# Patient Record
Sex: FEMALE | Race: BLACK OR AFRICAN AMERICAN | NOT HISPANIC OR LATINO | Employment: OTHER | URBAN - METROPOLITAN AREA
[De-identification: names, ages, dates, MRNs, and addresses within clinical notes are randomized per-mention and may not be internally consistent; named-entity substitution may affect disease eponyms.]

---

## 2017-01-30 ENCOUNTER — HISTORICAL (OUTPATIENT)
Dept: LAB | Facility: HOSPITAL | Age: 72
End: 2017-01-30

## 2017-02-01 ENCOUNTER — HISTORICAL (OUTPATIENT)
Dept: RADIOLOGY | Facility: HOSPITAL | Age: 72
End: 2017-02-01

## 2017-02-10 ENCOUNTER — HISTORICAL (OUTPATIENT)
Dept: RADIOLOGY | Facility: HOSPITAL | Age: 72
End: 2017-02-10

## 2017-04-28 ENCOUNTER — HISTORICAL (OUTPATIENT)
Dept: LAB | Facility: HOSPITAL | Age: 72
End: 2017-04-28

## 2017-07-28 ENCOUNTER — HISTORICAL (OUTPATIENT)
Dept: RADIOLOGY | Facility: HOSPITAL | Age: 72
End: 2017-07-28

## 2017-07-28 ENCOUNTER — HISTORICAL (OUTPATIENT)
Dept: LAB | Facility: HOSPITAL | Age: 72
End: 2017-07-28

## 2017-07-28 LAB
ABS NEUT (OLG): 6.77 X10(3)/MCL (ref 2.1–9.2)
ALBUMIN SERPL-MCNC: 3.6 GM/DL (ref 3.4–5)
ALBUMIN/GLOB SERPL: 0.9 {RATIO}
ALP SERPL-CCNC: 81 UNIT/L (ref 38–126)
ALT SERPL-CCNC: 35 UNIT/L (ref 12–78)
AST SERPL-CCNC: 32 UNIT/L (ref 15–37)
BASOPHILS # BLD AUTO: 0 X10(3)/MCL (ref 0–0.2)
BASOPHILS NFR BLD AUTO: 0 %
BILIRUB SERPL-MCNC: 0.3 MG/DL (ref 0.2–1)
BILIRUBIN DIRECT+TOT PNL SERPL-MCNC: 0.1 MG/DL (ref 0–0.2)
BILIRUBIN DIRECT+TOT PNL SERPL-MCNC: 0.2 MG/DL (ref 0–0.8)
BUN SERPL-MCNC: 13 MG/DL (ref 7–18)
CALCIUM SERPL-MCNC: 9 MG/DL (ref 8.5–10.1)
CHLORIDE SERPL-SCNC: 102 MMOL/L (ref 98–107)
CHOLEST SERPL-MCNC: 120 MG/DL (ref 0–200)
CHOLEST/HDLC SERPL: 1.9 {RATIO} (ref 0–4)
CO2 SERPL-SCNC: 30 MMOL/L (ref 21–32)
CREAT SERPL-MCNC: 0.58 MG/DL (ref 0.55–1.02)
EOSINOPHIL # BLD AUTO: 0.2 X10(3)/MCL (ref 0–0.9)
EOSINOPHIL NFR BLD AUTO: 2 %
ERYTHROCYTE [DISTWIDTH] IN BLOOD BY AUTOMATED COUNT: 14.2 % (ref 11.5–17)
EST. AVERAGE GLUCOSE BLD GHB EST-MCNC: 169 MG/DL
GLOBULIN SER-MCNC: 4 GM/DL (ref 2.4–3.5)
GLUCOSE SERPL-MCNC: 174 MG/DL (ref 74–106)
HBA1C MFR BLD: 7.5 % (ref 4.2–6.3)
HCT VFR BLD AUTO: 31.6 % (ref 37–47)
HDLC SERPL-MCNC: 64 MG/DL (ref 35–60)
HGB BLD-MCNC: 9.8 GM/DL (ref 12–16)
LDLC SERPL CALC-MCNC: 43 MG/DL (ref 0–129)
LYMPHOCYTES # BLD AUTO: 2 X10(3)/MCL (ref 0.6–4.6)
LYMPHOCYTES NFR BLD AUTO: 21 %
MCH RBC QN AUTO: 28.4 PG (ref 27–31)
MCHC RBC AUTO-ENTMCNC: 31 GM/DL (ref 33–36)
MCV RBC AUTO: 91.6 FL (ref 80–94)
MONOCYTES # BLD AUTO: 0.6 X10(3)/MCL (ref 0.1–1.3)
MONOCYTES NFR BLD AUTO: 6 %
NEUTROPHILS # BLD AUTO: 6.77 X10(3)/MCL (ref 2.1–9.2)
NEUTROPHILS NFR BLD AUTO: 70 %
PLATELET # BLD AUTO: 277 X10(3)/MCL (ref 130–400)
PMV BLD AUTO: 11.8 FL (ref 9.4–12.4)
POTASSIUM SERPL-SCNC: 4.4 MMOL/L (ref 3.5–5.1)
PROT SERPL-MCNC: 7.6 GM/DL (ref 6.4–8.2)
RBC # BLD AUTO: 3.45 X10(6)/MCL (ref 4.2–5.4)
SODIUM SERPL-SCNC: 139 MMOL/L (ref 136–145)
TRIGL SERPL-MCNC: 63 MG/DL (ref 30–150)
VLDLC SERPL CALC-MCNC: 13 MG/DL
WBC # SPEC AUTO: 9.6 X10(3)/MCL (ref 4.5–11.5)

## 2017-09-05 ENCOUNTER — HISTORICAL (OUTPATIENT)
Dept: RADIOLOGY | Facility: HOSPITAL | Age: 72
End: 2017-09-05

## 2017-10-27 ENCOUNTER — HISTORICAL (OUTPATIENT)
Dept: LAB | Facility: HOSPITAL | Age: 72
End: 2017-10-27

## 2017-10-27 LAB
ABS NEUT (OLG): 8.55 X10(3)/MCL (ref 2.1–9.2)
BASOPHILS # BLD AUTO: 0 X10(3)/MCL (ref 0–0.2)
BASOPHILS NFR BLD AUTO: 0 %
EOSINOPHIL # BLD AUTO: 0.2 X10(3)/MCL (ref 0–0.9)
EOSINOPHIL NFR BLD AUTO: 1 %
ERYTHROCYTE [DISTWIDTH] IN BLOOD BY AUTOMATED COUNT: 14.9 % (ref 11.5–17)
EST. AVERAGE GLUCOSE BLD GHB EST-MCNC: 160 MG/DL
FERRITIN SERPL-MCNC: 21.2 NG/ML (ref 8–388)
FOLATE SERPL-MCNC: 19 NG/ML (ref 3.1–17.5)
HAPTOGLOB SERPL-MCNC: 320 MG/DL (ref 31–200)
HBA1C MFR BLD: 7.2 % (ref 4.2–6.3)
HCT VFR BLD AUTO: 33.2 % (ref 37–47)
HGB BLD-MCNC: 10.3 GM/DL (ref 12–16)
IRON SATN MFR SERPL: 8.3 % (ref 20–50)
IRON SERPL-MCNC: 29 MCG/DL (ref 50–175)
LDH SERPL-CCNC: 162 UNIT/L (ref 84–246)
LYMPHOCYTES # BLD AUTO: 2.5 X10(3)/MCL (ref 0.6–4.6)
LYMPHOCYTES NFR BLD AUTO: 21 %
MCH RBC QN AUTO: 28.6 PG (ref 27–31)
MCHC RBC AUTO-ENTMCNC: 31 GM/DL (ref 33–36)
MCV RBC AUTO: 92.2 FL (ref 80–94)
MONOCYTES # BLD AUTO: 0.6 X10(3)/MCL (ref 0.1–1.3)
MONOCYTES NFR BLD AUTO: 5 %
NEUTROPHILS # BLD AUTO: 8.55 X10(3)/MCL (ref 1.4–7.9)
NEUTROPHILS NFR BLD AUTO: 72 %
PLATELET # BLD AUTO: 235 X10(3)/MCL (ref 130–400)
PMV BLD AUTO: 12.3 FL (ref 9.4–12.4)
RBC # BLD AUTO: 3.6 X10(6)/MCL (ref 4.2–5.4)
TIBC SERPL-MCNC: 350 MCG/DL (ref 250–450)
TRANSFERRIN SERPL-MCNC: 264 MG/DL (ref 200–360)
WBC # SPEC AUTO: 11.9 X10(3)/MCL (ref 4.5–11.5)

## 2018-01-26 ENCOUNTER — HISTORICAL (OUTPATIENT)
Dept: LAB | Facility: HOSPITAL | Age: 73
End: 2018-01-26

## 2018-01-26 LAB
ABS NEUT (OLG): 7.14 X10(3)/MCL (ref 2.1–9.2)
BASOPHILS # BLD AUTO: 0 X10(3)/MCL (ref 0–0.2)
BASOPHILS NFR BLD AUTO: 0 %
EOSINOPHIL # BLD AUTO: 0.1 X10(3)/MCL (ref 0–0.9)
EOSINOPHIL NFR BLD AUTO: 1 %
ERYTHROCYTE [DISTWIDTH] IN BLOOD BY AUTOMATED COUNT: 14.7 % (ref 11.5–17)
EST. AVERAGE GLUCOSE BLD GHB EST-MCNC: 160 MG/DL
HBA1C MFR BLD: 7.2 % (ref 4.2–6.3)
HCT VFR BLD AUTO: 29.3 % (ref 37–47)
HGB BLD-MCNC: 9.5 GM/DL (ref 12–16)
LYMPHOCYTES # BLD AUTO: 2.3 X10(3)/MCL (ref 0.6–4.6)
LYMPHOCYTES NFR BLD AUTO: 23 %
MCH RBC QN AUTO: 29 PG (ref 27–31)
MCHC RBC AUTO-ENTMCNC: 32.4 GM/DL (ref 33–36)
MCV RBC AUTO: 89.3 FL (ref 80–94)
MONOCYTES # BLD AUTO: 0.6 X10(3)/MCL (ref 0.1–1.3)
MONOCYTES NFR BLD AUTO: 5 %
NEUTROPHILS # BLD AUTO: 7.14 X10(3)/MCL (ref 2.1–9.2)
NEUTROPHILS NFR BLD AUTO: 70 %
PLATELET # BLD AUTO: 253 X10(3)/MCL (ref 130–400)
PMV BLD AUTO: 11.8 FL (ref 9.4–12.4)
RBC # BLD AUTO: 3.28 X10(6)/MCL (ref 4.2–5.4)
WBC # SPEC AUTO: 10.2 X10(3)/MCL (ref 4.5–11.5)

## 2018-04-26 ENCOUNTER — HISTORICAL (OUTPATIENT)
Dept: LAB | Facility: HOSPITAL | Age: 73
End: 2018-04-26

## 2018-04-26 LAB
ABS NEUT (OLG): 7.84 X10(3)/MCL (ref 2.1–9.2)
BASOPHILS # BLD AUTO: 0 X10(3)/MCL (ref 0–0.2)
BASOPHILS NFR BLD AUTO: 0 %
CHOLEST SERPL-MCNC: 128 MG/DL (ref 0–200)
CHOLEST/HDLC SERPL: 2 {RATIO} (ref 0–4)
CREAT UR-MCNC: 110 MG/DL
EOSINOPHIL # BLD AUTO: 0.2 X10(3)/MCL (ref 0–0.9)
EOSINOPHIL NFR BLD AUTO: 2 %
ERYTHROCYTE [DISTWIDTH] IN BLOOD BY AUTOMATED COUNT: 14.7 % (ref 11.5–17)
EST. AVERAGE GLUCOSE BLD GHB EST-MCNC: 154 MG/DL
HBA1C MFR BLD: 7 % (ref 4.2–6.3)
HCT VFR BLD AUTO: 33 % (ref 37–47)
HDLC SERPL-MCNC: 64 MG/DL (ref 35–60)
HGB BLD-MCNC: 10 GM/DL (ref 12–16)
LDLC SERPL CALC-MCNC: 51 MG/DL (ref 0–129)
LYMPHOCYTES # BLD AUTO: 2.6 X10(3)/MCL (ref 0.6–4.6)
LYMPHOCYTES NFR BLD AUTO: 23 %
MCH RBC QN AUTO: 28.9 PG (ref 27–31)
MCHC RBC AUTO-ENTMCNC: 30.3 GM/DL (ref 33–36)
MCV RBC AUTO: 95.4 FL (ref 80–94)
MICROALBUMIN UR-MCNC: 1.8 MG/DL
MICROALBUMIN/CREAT RATIO PNL UR: 16.4 MG/GM CR (ref 0–30)
MONOCYTES # BLD AUTO: 0.6 X10(3)/MCL (ref 0.1–1.3)
MONOCYTES NFR BLD AUTO: 5 %
NEUTROPHILS # BLD AUTO: 7.84 X10(3)/MCL (ref 1.4–7.9)
NEUTROPHILS NFR BLD AUTO: 69 %
PLATELET # BLD AUTO: 332 X10(3)/MCL (ref 130–400)
PMV BLD AUTO: 11.1 FL (ref 9.4–12.4)
RBC # BLD AUTO: 3.46 X10(6)/MCL (ref 4.2–5.4)
TRIGL SERPL-MCNC: 66 MG/DL (ref 30–150)
VLDLC SERPL CALC-MCNC: 13 MG/DL
WBC # SPEC AUTO: 11.4 X10(3)/MCL (ref 4.5–11.5)

## 2018-10-17 ENCOUNTER — HISTORICAL (OUTPATIENT)
Dept: RADIOLOGY | Facility: HOSPITAL | Age: 73
End: 2018-10-17

## 2018-10-23 ENCOUNTER — HISTORICAL (OUTPATIENT)
Dept: LAB | Facility: HOSPITAL | Age: 73
End: 2018-10-23

## 2018-10-23 ENCOUNTER — HISTORICAL (OUTPATIENT)
Dept: ADMINISTRATIVE | Facility: HOSPITAL | Age: 73
End: 2018-10-23

## 2018-10-23 LAB
ABS NEUT (OLG): 7.2 X10(3)/MCL (ref 2.1–9.2)
ALBUMIN SERPL-MCNC: 3.7 GM/DL (ref 3.4–5)
ALBUMIN/GLOB SERPL: 0.8 {RATIO}
ALP SERPL-CCNC: 76 UNIT/L (ref 38–126)
ALT SERPL-CCNC: 21 UNIT/L (ref 12–78)
AST SERPL-CCNC: 11 UNIT/L (ref 15–37)
BASOPHILS # BLD AUTO: 0 X10(3)/MCL (ref 0–0.2)
BASOPHILS NFR BLD AUTO: 0 %
BILIRUB SERPL-MCNC: 0.5 MG/DL (ref 0.2–1)
BILIRUBIN DIRECT+TOT PNL SERPL-MCNC: 0.1 MG/DL (ref 0–0.2)
BILIRUBIN DIRECT+TOT PNL SERPL-MCNC: 0.4 MG/DL (ref 0–0.8)
BUN SERPL-MCNC: 17 MG/DL (ref 7–18)
CALCIUM SERPL-MCNC: 9.5 MG/DL (ref 8.5–10.1)
CHLORIDE SERPL-SCNC: 102 MMOL/L (ref 98–107)
CO2 SERPL-SCNC: 29 MMOL/L (ref 21–32)
CREAT SERPL-MCNC: 0.73 MG/DL (ref 0.55–1.02)
CREAT UR-MCNC: 70 MG/DL
CREAT UR-MCNC: 91 MG/DL
DEPRECATED CALCIDIOL+CALCIFEROL SERPL-MC: 50.14 NG/ML (ref 30–80)
EOSINOPHIL # BLD AUTO: 0.1 X10(3)/MCL (ref 0–0.9)
EOSINOPHIL NFR BLD AUTO: 1 %
ERYTHROCYTE [DISTWIDTH] IN BLOOD BY AUTOMATED COUNT: 15.2 % (ref 11.5–17)
EST. AVERAGE GLUCOSE BLD GHB EST-MCNC: 148 MG/DL
GLOBULIN SER-MCNC: 4.6 GM/DL (ref 2.4–3.5)
GLUCOSE SERPL-MCNC: 80 MG/DL (ref 74–106)
HBA1C MFR BLD: 6.8 % (ref 4.2–6.3)
HCT VFR BLD AUTO: 34.2 % (ref 37–47)
HGB BLD-MCNC: 10.3 GM/DL (ref 12–16)
LYMPHOCYTES # BLD AUTO: 2.1 X10(3)/MCL (ref 0.6–4.6)
LYMPHOCYTES NFR BLD AUTO: 21 %
MAGNESIUM SERPL-MCNC: 1.7 MG/DL (ref 1.8–2.4)
MCH RBC QN AUTO: 28.3 PG (ref 27–31)
MCHC RBC AUTO-ENTMCNC: 30.1 GM/DL (ref 33–36)
MCV RBC AUTO: 94 FL (ref 80–94)
MICROALBUMIN UR-MCNC: 1.4 MG/DL
MICROALBUMIN/CREAT RATIO PNL UR: 15.4 MG/GM CR (ref 0–30)
MONOCYTES # BLD AUTO: 0.5 X10(3)/MCL (ref 0.1–1.3)
MONOCYTES NFR BLD AUTO: 5 %
NEUTROPHILS # BLD AUTO: 7.2 X10(3)/MCL (ref 2.1–9.2)
NEUTROPHILS NFR BLD AUTO: 72 %
PLATELET # BLD AUTO: 292 X10(3)/MCL (ref 130–400)
PMV BLD AUTO: 11.3 FL (ref 9.4–12.4)
POTASSIUM SERPL-SCNC: 4.8 MMOL/L (ref 3.5–5.1)
PROT SERPL-MCNC: 8.3 GM/DL (ref 6.4–8.2)
RBC # BLD AUTO: 3.64 X10(6)/MCL (ref 4.2–5.4)
SODIUM SERPL-SCNC: 140 MMOL/L (ref 136–145)
WBC # SPEC AUTO: 10 X10(3)/MCL (ref 4.5–11.5)

## 2018-10-31 ENCOUNTER — HISTORICAL (OUTPATIENT)
Dept: RADIOLOGY | Facility: HOSPITAL | Age: 73
End: 2018-10-31

## 2019-03-29 ENCOUNTER — HISTORICAL (OUTPATIENT)
Dept: RADIOLOGY | Facility: HOSPITAL | Age: 74
End: 2019-03-29

## 2019-06-10 ENCOUNTER — HISTORICAL (OUTPATIENT)
Dept: ENDOSCOPY | Facility: HOSPITAL | Age: 74
End: 2019-06-10

## 2019-06-10 LAB — CRC RECOMMENDATION EXT: NORMAL

## 2020-08-07 LAB
ALBUMIN SERPL-MCNC: 3.9 G/DL (ref 3.7–4.7)
ALBUMIN/GLOB SERPL: 1.3 {RATIO} (ref 1.2–2.2)
ALP SERPL-CCNC: 69 IU/L (ref 39–117)
ALT SERPL-CCNC: 13 IU/L (ref 0–32)
AST SERPL-CCNC: 13 IU/L (ref 0–40)
BASOPHILS # BLD AUTO: 0 X10E3/UL (ref 0–0.2)
BASOPHILS NFR BLD AUTO: 0 %
BILIRUB SERPL-MCNC: 0.2 MG/DL (ref 0–1.2)
BUN SERPL-MCNC: 14 MG/DL (ref 8–27)
CALCIUM SERPL-MCNC: 9.7 MG/DL (ref 8.7–10.3)
CHLORIDE SERPL-SCNC: 101 MMOL/L (ref 96–106)
CHOLEST SERPL-MCNC: 118 MG/DL (ref 100–199)
CHOLEST/HDLC SERPL: 1.8 RATIO (ref 0–4.4)
CO2 SERPL-SCNC: 24 MMOL/L (ref 20–29)
CREAT SERPL-MCNC: 0.71 MG/DL (ref 0.57–1)
CREAT/UREA NIT SERPL: 20 (ref 12–28)
DEPRECATED CALCIDIOL+CALCIFEROL SERPL-MC: 59 NG/ML (ref 30–100)
EOSINOPHIL # BLD AUTO: 0.1 X10E3/UL (ref 0–0.4)
EOSINOPHIL NFR BLD AUTO: 2 %
ERYTHROCYTE [DISTWIDTH] IN BLOOD BY AUTOMATED COUNT: 14 % (ref 11.7–15.4)
GLOBULIN SER-MCNC: 3.1 G/DL (ref 1.5–4.5)
GLUCOSE SERPL-MCNC: 113 MG/DL (ref 65–99)
HBA1C MFR BLD: 7.3 % (ref 4.8–5.6)
HCT VFR BLD AUTO: 29.5 % (ref 34–46.6)
HDLC SERPL-MCNC: 64 MG/DL
HGB BLD-MCNC: 9.3 G/DL (ref 11.1–15.9)
LDLC SERPL CALC-MCNC: 42 MG/DL (ref 0–99)
LYMPHOCYTES # BLD AUTO: 2.6 X10E3/UL (ref 0.7–3.1)
LYMPHOCYTES NFR BLD AUTO: 31 %
MCH RBC QN AUTO: 29.7 PG (ref 26.6–33)
MCHC RBC AUTO-ENTMCNC: 31.5 G/DL (ref 31.5–35.7)
MCV RBC AUTO: 94 FL (ref 79–97)
MICROALBUMIN/CREAT RATIO PNL UR: <11 MG/G CREAT (ref 0–29)
MONOCYTES # BLD AUTO: 0.4 X10E3/UL (ref 0.1–0.9)
MONOCYTES NFR BLD AUTO: 5 %
NEUTROPHILS # BLD AUTO: 5.2 X10E3/UL (ref 1.4–7)
NEUTROPHILS NFR BLD AUTO: 62 %
PLATELET # BLD AUTO: 261 X10E3/UL (ref 150–450)
POTASSIUM SERPL-SCNC: 4.4 MMOL/L (ref 3.5–5.2)
PROT SERPL-MCNC: 7 G/DL (ref 6–8.5)
RBC # BLD AUTO: 3.13 X10(6)/MCL (ref 3.77–5.28)
SODIUM SERPL-SCNC: 141 MMOL/L (ref 134–144)
TRIGL SERPL-MCNC: 61 MG/DL (ref 0–149)
TSH SERPL-ACNC: 1.75 MIU/ML (ref 0.45–4.5)
VLDLC SERPL CALC-MCNC: 12 MG/DL (ref 5–40)
WBC # SPEC AUTO: 8.3 X10E3/UL (ref 3.4–10.8)

## 2020-08-28 ENCOUNTER — HISTORICAL (OUTPATIENT)
Dept: RADIOLOGY | Facility: HOSPITAL | Age: 75
End: 2020-08-28

## 2020-09-29 ENCOUNTER — HISTORICAL (OUTPATIENT)
Dept: ADMINISTRATIVE | Facility: HOSPITAL | Age: 75
End: 2020-09-29

## 2020-09-29 LAB
ABS NEUT (OLG): 7.94 X10(3)/MCL (ref 2.1–9.2)
BASOPHILS # BLD AUTO: 0 X10(3)/MCL (ref 0–0.2)
BASOPHILS NFR BLD AUTO: 0.3 %
EOSINOPHIL # BLD AUTO: 0 X10(3)/MCL (ref 0–0.9)
EOSINOPHIL NFR BLD AUTO: 0.2 %
ERYTHROCYTE [DISTWIDTH] IN BLOOD BY AUTOMATED COUNT: 13.6 % (ref 11.5–17)
FERRITIN SERPL-MCNC: 24.83 NG/ML (ref 4.63–204)
FOLATE SERPL-MCNC: 31.2 NG/ML (ref 7–31.4)
GROUP & RH: NORMAL
HAPTOGLOB SERPL-MCNC: 431 MG/DL (ref 63–273)
HCT VFR BLD AUTO: 31.1 % (ref 37–47)
HGB BLD-MCNC: 9.9 GM/DL (ref 12–16)
IRON SATN MFR SERPL: 10 % (ref 20–50)
IRON SERPL-MCNC: 31 UG/DL (ref 50–170)
LDH SERPL-CCNC: 245 UNIT/L (ref 140–271)
LYMPHOCYTES # BLD AUTO: 1.8 X10(3)/MCL (ref 0.6–4.6)
LYMPHOCYTES NFR BLD AUTO: 18 %
MCH RBC QN AUTO: 29.4 PG (ref 27–31)
MCHC RBC AUTO-ENTMCNC: 31.8 GM/DL (ref 33–36)
MCV RBC AUTO: 92.3 FL (ref 80–94)
MONOCYTES # BLD AUTO: 0.4 X10(3)/MCL (ref 0.1–1.3)
MONOCYTES NFR BLD AUTO: 3.9 %
NEUTROPHILS # BLD AUTO: 7.9 X10(3)/MCL (ref 2.1–9.2)
NEUTROPHILS NFR BLD AUTO: 77.4 %
PLATELET # BLD AUTO: 310 X10(3)/MCL (ref 130–400)
PMV BLD AUTO: 10.2 FL (ref 9.4–12.4)
PROT SERPL-MCNC: 8 GM/DL
RBC # BLD AUTO: 3.37 X10(6)/MCL (ref 4.2–5.4)
RET# (OHS): 0.05 X10^6/ML (ref 0.02–0.08)
RETICULOCYTE COUNT AUTOMATED (OLG): 1.6 % (ref 1.1–2.1)
RHEUMATOID FACT SERPL-ACNC: <13 IU/ML
TIBC SERPL-MCNC: 280 UG/DL (ref 70–310)
TIBC SERPL-MCNC: 311 UG/DL (ref 250–450)
TRANSFERRIN SERPL-MCNC: 279 MG/DL (ref 173–360)
VIT B12 SERPL-MCNC: 180 PG/ML (ref 213–816)
WBC # SPEC AUTO: 10.2 X10(3)/MCL (ref 4.5–11.5)

## 2020-10-12 ENCOUNTER — HISTORICAL (OUTPATIENT)
Dept: INFUSION THERAPY | Facility: HOSPITAL | Age: 75
End: 2020-10-12

## 2020-10-19 ENCOUNTER — HISTORICAL (OUTPATIENT)
Dept: INFUSION THERAPY | Facility: HOSPITAL | Age: 75
End: 2020-10-19

## 2020-10-21 ENCOUNTER — HISTORICAL (OUTPATIENT)
Dept: ADMINISTRATIVE | Facility: HOSPITAL | Age: 75
End: 2020-10-21

## 2020-11-04 ENCOUNTER — HISTORICAL (OUTPATIENT)
Dept: ADMINISTRATIVE | Facility: HOSPITAL | Age: 75
End: 2020-11-04

## 2020-11-17 ENCOUNTER — HISTORICAL (OUTPATIENT)
Dept: HEMATOLOGY/ONCOLOGY | Facility: CLINIC | Age: 75
End: 2020-11-17

## 2020-11-17 LAB
ABS NEUT (OLG): 6.17 X10(3)/MCL (ref 2.1–9.2)
BASOPHILS # BLD AUTO: 0 X10(3)/MCL (ref 0–0.2)
BASOPHILS NFR BLD AUTO: 0.2 %
EOSINOPHIL # BLD AUTO: 0.1 X10(3)/MCL (ref 0–0.9)
EOSINOPHIL NFR BLD AUTO: 0.9 %
ERYTHROCYTE [DISTWIDTH] IN BLOOD BY AUTOMATED COUNT: 14.2 % (ref 11.5–17)
FERRITIN SERPL-MCNC: 384.82 NG/ML (ref 4.63–204)
HCT VFR BLD AUTO: 32.6 % (ref 37–47)
HGB BLD-MCNC: 10.5 GM/DL (ref 12–16)
IRON SATN MFR SERPL: 34 % (ref 20–50)
IRON SERPL-MCNC: 74 UG/DL (ref 50–170)
LYMPHOCYTES # BLD AUTO: 2.6 X10(3)/MCL (ref 0.6–4.6)
LYMPHOCYTES NFR BLD AUTO: 27.6 %
MCH RBC QN AUTO: 30.6 PG (ref 27–31)
MCHC RBC AUTO-ENTMCNC: 32.2 GM/DL (ref 33–36)
MCV RBC AUTO: 95 FL (ref 80–94)
MONOCYTES # BLD AUTO: 0.4 X10(3)/MCL (ref 0.1–1.3)
MONOCYTES NFR BLD AUTO: 4.8 %
NEUTROPHILS # BLD AUTO: 6.2 X10(3)/MCL (ref 2.1–9.2)
NEUTROPHILS NFR BLD AUTO: 66.4 %
PLATELET # BLD AUTO: 256 X10(3)/MCL (ref 130–400)
PMV BLD AUTO: 10.4 FL (ref 9.4–12.4)
RBC # BLD AUTO: 3.43 X10(6)/MCL (ref 4.2–5.4)
TIBC SERPL-MCNC: 142 UG/DL (ref 70–310)
TIBC SERPL-MCNC: 216 UG/DL (ref 250–450)
TRANSFERRIN SERPL-MCNC: 179 MG/DL (ref 173–360)
VIT B12 SERPL-MCNC: >2000 PG/ML (ref 213–816)
WBC # SPEC AUTO: 9.3 X10(3)/MCL (ref 4.5–11.5)

## 2021-02-19 ENCOUNTER — HISTORICAL (OUTPATIENT)
Dept: HEMATOLOGY/ONCOLOGY | Facility: CLINIC | Age: 76
End: 2021-02-19

## 2021-02-19 LAB
ABS NEUT (OLG): 6.51 X10(3)/MCL (ref 2.1–9.2)
BASOPHILS # BLD AUTO: 0 X10(3)/MCL (ref 0–0.2)
BASOPHILS NFR BLD AUTO: 0 %
EOSINOPHIL # BLD AUTO: 0.1 X10(3)/MCL (ref 0–0.9)
EOSINOPHIL NFR BLD AUTO: 1 %
ERYTHROCYTE [DISTWIDTH] IN BLOOD BY AUTOMATED COUNT: 12.4 % (ref 11.5–17)
FERRITIN SERPL-MCNC: 214.89 NG/ML (ref 4.63–204)
HCT VFR BLD AUTO: 34.1 % (ref 37–47)
HGB BLD-MCNC: 10.9 GM/DL (ref 12–16)
IRON SATN MFR SERPL: 25 % (ref 20–50)
IRON SERPL-MCNC: 58 UG/DL (ref 50–170)
LYMPHOCYTES # BLD AUTO: 2.7 X10(3)/MCL (ref 0.6–4.6)
LYMPHOCYTES NFR BLD AUTO: 28 %
MCH RBC QN AUTO: 31.5 PG (ref 27–31)
MCHC RBC AUTO-ENTMCNC: 32 GM/DL (ref 33–36)
MCV RBC AUTO: 98.6 FL (ref 80–94)
MONOCYTES # BLD AUTO: 0.5 X10(3)/MCL (ref 0.1–1.3)
MONOCYTES NFR BLD AUTO: 5 %
NEUTROPHILS # BLD AUTO: 6.51 X10(3)/MCL (ref 2.1–9.2)
NEUTROPHILS NFR BLD AUTO: 66 %
PLATELET # BLD AUTO: 265 X10(3)/MCL (ref 130–400)
PMV BLD AUTO: 11.7 FL (ref 9.4–12.4)
RBC # BLD AUTO: 3.46 X10(6)/MCL (ref 4.2–5.4)
TIBC SERPL-MCNC: 172 UG/DL (ref 70–310)
TIBC SERPL-MCNC: 230 UG/DL (ref 250–450)
TRANSFERRIN SERPL-MCNC: 182 MG/DL (ref 173–360)
VIT B12 SERPL-MCNC: 695 PG/ML (ref 213–816)
WBC # SPEC AUTO: 9.8 X10(3)/MCL (ref 4.5–11.5)

## 2021-03-09 ENCOUNTER — HISTORICAL (OUTPATIENT)
Dept: RADIOLOGY | Facility: HOSPITAL | Age: 76
End: 2021-03-09

## 2021-04-09 ENCOUNTER — HISTORICAL (OUTPATIENT)
Dept: RADIOLOGY | Facility: HOSPITAL | Age: 76
End: 2021-04-09

## 2021-04-09 LAB — BMD RECOMMENDATION EXT: NORMAL

## 2021-06-21 ENCOUNTER — HISTORICAL (OUTPATIENT)
Dept: HEMATOLOGY/ONCOLOGY | Facility: CLINIC | Age: 76
End: 2021-06-21

## 2021-06-21 LAB
ABS NEUT (OLG): 6.24 X10(3)/MCL (ref 2.1–9.2)
ALBUMIN SERPL-MCNC: 3.3 GM/DL (ref 3.4–4.8)
ALBUMIN/GLOB SERPL: 0.9 RATIO (ref 1.1–2)
ALP SERPL-CCNC: 69 UNIT/L (ref 40–150)
ALT SERPL-CCNC: 12 UNIT/L (ref 0–55)
AST SERPL-CCNC: 10 UNIT/L (ref 5–34)
BASOPHILS # BLD AUTO: 0 X10(3)/MCL (ref 0–0.2)
BASOPHILS NFR BLD AUTO: 0.3 %
BILIRUB SERPL-MCNC: 0.2 MG/DL
BILIRUBIN DIRECT+TOT PNL SERPL-MCNC: 0 MG/DL (ref 0–0.8)
BILIRUBIN DIRECT+TOT PNL SERPL-MCNC: 0.2 MG/DL (ref 0–0.5)
BUN SERPL-MCNC: 19.7 MG/DL (ref 9.8–20.1)
CALCIUM SERPL-MCNC: 9.8 MG/DL (ref 8.4–10.2)
CHLORIDE SERPL-SCNC: 105 MMOL/L (ref 98–107)
CO2 SERPL-SCNC: 29 MMOL/L (ref 23–31)
CREAT SERPL-MCNC: 0.78 MG/DL (ref 0.55–1.02)
EOSINOPHIL # BLD AUTO: 0.2 X10(3)/MCL (ref 0–0.9)
EOSINOPHIL NFR BLD AUTO: 1.6 %
ERYTHROCYTE [DISTWIDTH] IN BLOOD BY AUTOMATED COUNT: 12.8 % (ref 11.5–17)
FERRITIN SERPL-MCNC: 123.62 NG/ML (ref 4.63–204)
GLOBULIN SER-MCNC: 3.7 GM/DL (ref 2.4–3.5)
GLUCOSE SERPL-MCNC: 119 MG/DL (ref 82–115)
HCT VFR BLD AUTO: 31.1 % (ref 37–47)
HGB BLD-MCNC: 10.3 GM/DL (ref 12–16)
IRON SATN MFR SERPL: 22 % (ref 20–50)
IRON SERPL-MCNC: 46 UG/DL (ref 50–170)
LYMPHOCYTES # BLD AUTO: 2.5 X10(3)/MCL (ref 0.6–4.6)
LYMPHOCYTES NFR BLD AUTO: 26 %
MCH RBC QN AUTO: 31.1 PG (ref 27–31)
MCHC RBC AUTO-ENTMCNC: 33.1 GM/DL (ref 33–36)
MCV RBC AUTO: 94 FL (ref 80–94)
MONOCYTES # BLD AUTO: 0.6 X10(3)/MCL (ref 0.1–1.3)
MONOCYTES NFR BLD AUTO: 5.9 %
NEUTROPHILS # BLD AUTO: 6.2 X10(3)/MCL (ref 2.1–9.2)
NEUTROPHILS NFR BLD AUTO: 66 %
PLATELET # BLD AUTO: 237 X10(3)/MCL (ref 130–400)
PMV BLD AUTO: 11 FL (ref 9.4–12.4)
POTASSIUM SERPL-SCNC: 4.6 MMOL/L (ref 3.5–5.1)
PROT SERPL-MCNC: 7 GM/DL (ref 5.8–7.6)
RBC # BLD AUTO: 3.31 X10(6)/MCL (ref 4.2–5.4)
SODIUM SERPL-SCNC: 140 MMOL/L (ref 136–145)
TIBC SERPL-MCNC: 167 UG/DL (ref 70–310)
TIBC SERPL-MCNC: 213 UG/DL (ref 250–450)
TRANSFERRIN SERPL-MCNC: 201 MG/DL (ref 173–360)
VIT B12 SERPL-MCNC: 313 PG/ML (ref 213–816)
WBC # SPEC AUTO: 9.5 X10(3)/MCL (ref 4.5–11.5)

## 2021-10-22 ENCOUNTER — HISTORICAL (OUTPATIENT)
Dept: HEMATOLOGY/ONCOLOGY | Facility: CLINIC | Age: 76
End: 2021-10-22

## 2021-10-22 LAB
ABS NEUT (OLG): 5.77 X10(3)/MCL (ref 2.1–9.2)
ALBUMIN SERPL-MCNC: 3.6 GM/DL (ref 3.4–4.8)
ALBUMIN/GLOB SERPL: 1 RATIO (ref 1.1–2)
ALP SERPL-CCNC: 64 UNIT/L (ref 40–150)
ALT SERPL-CCNC: 13 UNIT/L (ref 0–55)
AST SERPL-CCNC: 15 UNIT/L (ref 5–34)
BASOPHILS # BLD AUTO: 0 X10(3)/MCL (ref 0–0.2)
BASOPHILS NFR BLD AUTO: 0.5 %
BILIRUB SERPL-MCNC: 0.3 MG/DL
BILIRUBIN DIRECT+TOT PNL SERPL-MCNC: 0.1 MG/DL (ref 0–0.8)
BILIRUBIN DIRECT+TOT PNL SERPL-MCNC: 0.2 MG/DL (ref 0–0.5)
BUN SERPL-MCNC: 16.8 MG/DL (ref 9.8–20.1)
CALCIUM SERPL-MCNC: 9.7 MG/DL (ref 8.7–10.5)
CHLORIDE SERPL-SCNC: 103 MMOL/L (ref 98–107)
CO2 SERPL-SCNC: 27 MMOL/L (ref 23–31)
CREAT SERPL-MCNC: 0.82 MG/DL (ref 0.55–1.02)
EOSINOPHIL # BLD AUTO: 0.1 X10(3)/MCL (ref 0–0.9)
EOSINOPHIL NFR BLD AUTO: 1.4 %
ERYTHROCYTE [DISTWIDTH] IN BLOOD BY AUTOMATED COUNT: 12.9 % (ref 11.5–17)
FERRITIN SERPL-MCNC: 109.72 NG/ML (ref 4.63–204)
GLOBULIN SER-MCNC: 3.5 GM/DL (ref 2.4–3.5)
GLUCOSE SERPL-MCNC: 104 MG/DL (ref 82–115)
HCT VFR BLD AUTO: 31.2 % (ref 37–47)
HGB BLD-MCNC: 10 GM/DL (ref 12–16)
IRON SATN MFR SERPL: 21 % (ref 20–50)
IRON SERPL-MCNC: 49 UG/DL (ref 50–170)
LYMPHOCYTES # BLD AUTO: 2.4 X10(3)/MCL (ref 0.6–4.6)
LYMPHOCYTES NFR BLD AUTO: 27.5 %
MCH RBC QN AUTO: 30.7 PG (ref 27–31)
MCHC RBC AUTO-ENTMCNC: 32.1 GM/DL (ref 33–36)
MCV RBC AUTO: 95.7 FL (ref 80–94)
MONOCYTES # BLD AUTO: 0.5 X10(3)/MCL (ref 0.1–1.3)
MONOCYTES NFR BLD AUTO: 5.2 %
NEUTROPHILS # BLD AUTO: 5.8 X10(3)/MCL (ref 2.1–9.2)
NEUTROPHILS NFR BLD AUTO: 65.3 %
PLATELET # BLD AUTO: 251 X10(3)/MCL (ref 130–400)
PMV BLD AUTO: 10.3 FL (ref 9.4–12.4)
POTASSIUM SERPL-SCNC: 4.4 MMOL/L (ref 3.5–5.1)
PROT SERPL-MCNC: 7.1 GM/DL (ref 5.8–7.6)
RBC # BLD AUTO: 3.26 X10(6)/MCL (ref 4.2–5.4)
SODIUM SERPL-SCNC: 142 MMOL/L (ref 136–145)
TIBC SERPL-MCNC: 179 UG/DL (ref 70–310)
TIBC SERPL-MCNC: 228 UG/DL (ref 250–450)
TRANSFERRIN SERPL-MCNC: 206 MG/DL (ref 173–360)
VIT B12 SERPL-MCNC: 359 PG/ML (ref 213–816)
WBC # SPEC AUTO: 8.8 X10(3)/MCL (ref 4.5–11.5)

## 2022-02-25 ENCOUNTER — HISTORICAL (OUTPATIENT)
Dept: HEMATOLOGY/ONCOLOGY | Facility: CLINIC | Age: 77
End: 2022-02-25

## 2022-02-25 LAB
(HCYS)2 SERPL-MCNC: 6.89 (ref 5.08–15.39)
ABS NEUT (OLG): 5.28 (ref 2.1–9.2)
BASOPHILS # BLD AUTO: 0 10*3/UL (ref 0–0.2)
BASOPHILS NFR BLD AUTO: 0.3 %
EOSINOPHIL # BLD AUTO: 0.1 10*3/UL (ref 0–0.9)
EOSINOPHIL NFR BLD AUTO: 0.9 %
ERYTHROCYTE [DISTWIDTH] IN BLOOD BY AUTOMATED COUNT: 13 % (ref 11.5–17)
FERRITIN SERPL-MCNC: 45.04 NG/ML (ref 4.63–204)
FOLATE SERPL-MCNC: 15.9 NG/ML (ref 7–31.4)
HAPTOGLOB SERPL-MCNC: 315 MG/DL (ref 63–273)
HCT VFR BLD AUTO: 30.7 % (ref 37–47)
HGB BLD-MCNC: 9.8 G/DL (ref 12–16)
IRON SATN MFR SERPL: 17 % (ref 20–50)
IRON SERPL-MCNC: 44 UG/DL (ref 50–170)
LDH SERPL-CCNC: 127 U/L (ref 140–271)
LYMPHOCYTES # BLD AUTO: 1.9 10*3/UL (ref 0.6–4.6)
LYMPHOCYTES NFR BLD AUTO: 24.6 %
MANUAL DIFF? (OHS): NO
MCH RBC QN AUTO: 30.6 PG (ref 27–31)
MCHC RBC AUTO-ENTMCNC: 31.9 G/DL (ref 33–36)
MCV RBC AUTO: 95.9 FL (ref 80–94)
MONOCYTES # BLD AUTO: 0.3 10*3/UL (ref 0.1–1.3)
MONOCYTES NFR BLD AUTO: 4.2 %
NEUTROPHILS # BLD AUTO: 5.3 10*3/UL (ref 2.1–9.2)
NEUTROPHILS NFR BLD AUTO: 69.9 %
PLATELET # BLD AUTO: 295 10*3/UL (ref 130–400)
PMV BLD AUTO: 10.3 FL (ref 9.4–12.4)
RBC # BLD AUTO: 3.2 10*6/UL (ref 4.2–5.4)
RET# (OHS): 0.05 (ref 0.02–0.08)
RETICULOCYTE COUNT AUTOMATED (OLG): 1.5 (ref 1.1–2.1)
TIBC SERPL-MCNC: 209 UG/DL (ref 70–310)
TIBC SERPL-MCNC: 253 UG/DL (ref 250–450)
TRANSFERRIN SERPL-MCNC: 235 MG/DL (ref 173–360)
VIT B12 SERPL-MCNC: 1081 PG/ML (ref 213–816)
WBC # SPEC AUTO: 7.6 10*3/UL (ref 4.5–11.5)

## 2022-03-05 LAB
ANTINUCLEAR ANTIBODY SCREEN (OHS): NORMAL
CENTROMERE PROTEIN ANTIBODY (OHS): NEGATIVE
CENTROMERE QUANT (OHS): <0.4
DSDNA AB QUANT (OHS): 1.6
DSDNA ANTIBODY (OHS): NEGATIVE
JO-1 AB QUANT (OHS): 0.4
JO-1 ANTIBODY (OHS): NEGATIVE
RNP70 AB QUANT (OHS): 0.9
RNP70 ANTIBODY (OHS): NEGATIVE
SCL-70S AB QUANT (OHS): 0.9
SCLERODERMA (SCL-70S) ANTIBODY (OHS): NEGATIVE
SMITH AB QUANT (OHS): 2.9
SMITH DP IGG (OHS): NEGATIVE
SSA(RO) AB QUANT (OHS): 0.4
SSA(RO) ANTIBODY (OHS): NEGATIVE
SSB(LA) AB QUANT (OHS): 1.4
SSB(LA) ANTIBODY (OHS): NEGATIVE
U1RNP AB QUANT (OHS): 5.4
U1RNP ANTIBODY (OHS): NORMAL

## 2022-03-31 ENCOUNTER — HISTORICAL (OUTPATIENT)
Dept: RADIOLOGY | Facility: HOSPITAL | Age: 77
End: 2022-03-31

## 2022-03-31 ENCOUNTER — HISTORICAL (OUTPATIENT)
Dept: ADMINISTRATIVE | Facility: HOSPITAL | Age: 77
End: 2022-03-31

## 2022-03-31 LAB — BCS RECOMMENDATION EXT: NORMAL

## 2022-04-10 ENCOUNTER — HISTORICAL (OUTPATIENT)
Dept: ADMINISTRATIVE | Facility: HOSPITAL | Age: 77
End: 2022-04-10
Payer: MEDICARE

## 2022-04-11 ENCOUNTER — HISTORICAL (OUTPATIENT)
Dept: INFUSION THERAPY | Facility: HOSPITAL | Age: 77
End: 2022-04-11

## 2022-04-26 ENCOUNTER — HISTORICAL (OUTPATIENT)
Dept: ADMINISTRATIVE | Facility: HOSPITAL | Age: 77
End: 2022-04-26
Payer: MEDICARE

## 2022-04-26 ENCOUNTER — HISTORICAL (OUTPATIENT)
Dept: RADIOLOGY | Facility: HOSPITAL | Age: 77
End: 2022-04-26
Payer: MEDICARE

## 2022-04-26 LAB — BCS RECOMMENDATION EXT: NORMAL

## 2022-04-30 VITALS
SYSTOLIC BLOOD PRESSURE: 116 MMHG | WEIGHT: 203.69 LBS | BODY MASS INDEX: 37.48 KG/M2 | DIASTOLIC BLOOD PRESSURE: 70 MMHG | HEIGHT: 62 IN | OXYGEN SATURATION: 98 %

## 2022-04-30 NOTE — OP NOTE
DATE OF SURGERY:    11/04/2020    SURGEON:  Karina Peterson MD    PREOPERATIVE DIAGNOSIS:  Visually significant nuclear sclerotic cataract of the right eye.    POSTOPERATIVE DIAGNOSIS:  Visually significant nuclear sclerotic cataract of the right eye.    OPERATIVE PROCEDURE PERFORMED:  Phacoemulsification with intraocular lens insertion of the right eye.    ANESTHESIA:  MAC.    COMPLICATIONS:  None.    LENS:  J and J model VCB00, 32.0 diopters,  #[id number].    PROCEDURE:  After informed consent was obtained, the patient was taken to the operative suite and placed in the supine position.  The right eye was then prepped and draped in the normal sterile fashion for intraocular surgery.  With the surgeon sitting temporally, a 1.0 mm keratome blade was used to make a paracentesis incision.  Intraocular lidocaine with epinephrine was injected into the anterior chamber followed by viscoelastic.  Next, a 2.4 mm keratome blade was used to make a temporal clear corneal incision.  A cystotome was then used to make a nick in the anterior capsule.  The Utrata forceps were then used to create a continuous curvilinear capsulorrhexis.  The lens was then hydrodissected with BSS on a Singleton cannula, noted to be freely mobile.  The lens was then removed from the eye with the phacoemulsification handpiece in a stop-and-chop technique.  Remaining cortex was then removed using the irrigation and aspiration handpiece.  The capsular bag was then inflated with viscoelastic, and the above-mentioned intraocular lens was then inserted into the capsular bag and rotated into position.  The remaining viscoelastic was then removed from the eye using the I and A handpiece.  The wounds were sealed with BSS and noted to be watertight.  The pressure was physiologic.  The eyelid speculum was removed from the eye and the anterior chamber remained formed.  A drop of antibiotic was placed in the     operative eye, which was then shielded.  The  patient tolerated the procedure well.  There were no complications.  She is instructed to follow up with Dr. Peterson in clinic tomorrow.        ______________________________  MD LAVERN Dudley/  DD:  11/04/2020  Time:  09:59AM  DT:  11/04/2020  Time:  10:11AM  Job #:  [id number]

## 2022-04-30 NOTE — OP NOTE
Patient:   Edyta Rubio             MRN: [id number]            FIN: [id number]               Age:   73 years     Sex:  Female     :  1945   Associated Diagnoses:   None   Author:   Anna Moore MD      Operative Note   Operative Information   Date/ Time:  6/10/2019 11:22:00.     Procedures Performed: EGD, diagnostic.     Preoperative Diagnosis: Anemia, heartburn, bloating, dark stools..     Postoperative Diagnosis: Normal EGD..     Surgeon: Anna Moore MD.     Assistant: GI staff.     Anesthesia: per anaesthesia service.     Speciman Removed: None.     Esimated blood loss: No blood loss.     Description of Procedure/Findings/    Complications: History and physical as per pre-operative note. Informed consent was obtained. Patient was placed in left lateral position. Sedation per anaesthesia service. Olympus video gastroscope was introduced into the oral cavity and esophagus was intubated under direct visuaization. The scope was carefully advanced to the distal duodenum. The patient tolerated the procedure well without any complications. .     Findings: Esophagus: GE junction at approximately 40 cm.  Normal mucosa.  Stomach: Fundus, cardia, body and antrum appeared unremarkable.  Duodenum: Duodenal bulb, 2nd and 3rd portion of the duodenum appeared unremarkable to the extent of exam..     Complications: None.     Recommendations:  1.  Diet as tolerated.  2.  Colonoscopy to follow    c.c.: Dr. Darline Rooney.

## 2022-04-30 NOTE — OP NOTE
DATE OF SURGERY:    10/21/2020    SURGEON:  Karina Peterson MD    PREOPERATIVE DIAGNOSIS:  Visually significant nuclear sclerotic cataract of the right eye.    POSTOPERATIVE DIAGNOSIS:  Visually significant nuclear sclerotic cataract of the right eye.    OPERATIVE PROCEDURE PERFORMED:  Phacoemulsification with intraocular lens insertion of the right eye.    LENS IMPLANT:  J and J model ZCB00, 33.5 diopters,  #[id number].    ANESTHESIA:  MAC.    COMPLICATIONS:  None.    PROCEDURE IN DETAIL:  After informed consent was obtained, the patient was taken to the operative suite and placed in the supine position.  The operative eye was then prepped and draped in normal sterile fashion for intraocular surgery.  Of note, the patient did receive mannitol preoperatively as well as a [place] balloon due to short eye.  With the surgeon sitting temporally, a 1.0 mm keratome blade was used to make a paracentesis incision.  Intraocular lidocaine with epinephrine was injected into the anterior chamber followed by viscoelastic.  Next, a 2.4 mm keratome blade was used to make a temporal clear corneal incision.  A cystotome was used to make an anterior nick in the anterior capsule.  Utrata forceps were then used to create a continuous curvilinear capsulorrhexis.  BSS on a Singleton cannula was then used to hydrodissect the lens which was noted to be freely mobile.  The lens was then removed from the eye using the phacoemulsification handpiece in a chop technique.  Then, remaining cortex was removed from the eye using the irrigation/aspiration handpiece.  The capsular bag was then inflated with viscoelastic, and the above-mentioned intraocular lens was then inserted into the capsular bag and rotated into position.  Remaining viscoelastic was removed from the eye.  The wounds were then sealed with BSS and noted to be watertight.  A 10-0 nylon suture was placed in the main wound, and the eye pressure was felt to be     physiologic.  The  patient tolerated the procedure well.  There were no complications.  The eye was shielded after a drop of antibiotic was placed.  The patient was instructed to follow up Dr. Peterson in clinic tomorrow.        ______________________________  MD LAVERN Dudley/  DD:  10/21/2020  Time:  09:12AM  DT:  10/21/2020  Time:  09:27AM  Job #:  [id number]

## 2022-04-30 NOTE — OP NOTE
Patient:   Edyta Rubio             MRN: [id number]            FIN: [id number]               Age:   73 years     Sex:  Female     :  1945   Associated Diagnoses:   None   Author:   Anna Moore MD      Operative Note   Operative Information   Date/ Time:  6/10/2019 11:47:00.     Procedures Performed: Colonoscopy with biopsy.     Preoperative Diagnosis: Anemia, history of dark stools, history of colon polyps.     Postoperative Diagnosis: 1.  Colon polyps, small, ×2.  Biopsies were obtained.  2.  Lipomatous prominence, distal transverse colon.  Not biopsied.  3.  Internal hemorrhoids, grade 1-2.  Otherwise normal exam.     Surgeon: Anna Moroe MD.     Assistant: GI staff.     Anesthesia: per anaesthesia service.     Speciman Removed: Yes.     Esimated blood loss: loss  3  cc.     Description of Procedure/Findings/    Complications: History and physical as per pre-operative note. Informed consent was obtained. Patient was placed in left lateral position. Sedation per anaesthesia service. Rectal exam was unremarkable. Olympus video colonoscope was introduced into the rectum and was carefully advanced to the cecum. The quality of the preparation was satisfactory. Patient tolerated the procedure well without any complications..     Findings: There was a 2-3 mm polyp noted around hepatic flexure.  Biopsies were obtained.  There was another 2-3 mm polyp which seemed to be hyperplastic was noted to in the distal transverse colon.  This was biopsied.  There was a lipomatous prominence noted in the distal transverse colon as well.  This was not biopsied and appeared to be smooth surface.  Terminal ileum was intubated during procedure and appeared to be unremarkable.  Cecum and ascending colon were unremarkable.  Descending colon, sigmoid colon and rectum appeared unremarkable except grade 1-2 internal hemorrhoids noted on withdrawal of the scope.  Estimated withdrawal time from cecum to rectum was 9 minutes and 39  seconds..     Complications: None.     Recommendations:  1.  Follow biopsy results.  2.  Repeat colonoscopy in 5 or more years pending biopsy results if medical status permits.    errol.: Dr. Darline Rooney.

## 2022-05-05 ENCOUNTER — HISTORICAL (OUTPATIENT)
Dept: ADMINISTRATIVE | Facility: HOSPITAL | Age: 77
End: 2022-05-05
Payer: MEDICARE

## 2022-05-10 RX ORDER — POTASSIUM CHLORIDE 600 MG/1
TABLET, FILM COATED, EXTENDED RELEASE ORAL
Qty: 90 TABLET | Refills: 11 | Status: SHIPPED | OUTPATIENT
Start: 2022-05-10 | End: 2023-05-01

## 2022-05-19 ENCOUNTER — TELEPHONE (OUTPATIENT)
Dept: FAMILY MEDICINE | Facility: CLINIC | Age: 77
End: 2022-05-19
Payer: MEDICARE

## 2022-05-19 NOTE — TELEPHONE ENCOUNTER
Per [facility] patient should be taking Trulicity 0.75 mg  Pharmacy states Dr. Rooney increased Trulicity dose to 1.5. Do not see record of increase   Please Advise         ----- Message from Loren Palma sent at 5/19/2022 12:50 PM CDT -----  Regarding: Advice  Type:  Needs Medical Advice    Who Called: Patient  Symptoms (please be specific):    How long has patient had these symptoms:    Pharmacy name and phone #:    Would the patient rather a call back or a response via [facility]?   Best Call Back Number:  [phone]  Additional Information: Patient needs to know if and why her medication dosage was increased. Her trulicity.

## 2022-05-20 NOTE — TELEPHONE ENCOUNTER
----- Message from Evie Mccord sent at 5/19/2022 11:53 AM CDT -----  Regarding: Advice  Type:  Needs Medical Advice    Who Called: Patient  Symptoms (please be specific):    How long has patient had these symptoms:    Pharmacy name and phone #:    Would the patient rather a call back or a response via [facility]? Call  Best Call Back Number: [phone]  Additional Information: Patient just picked up her Trulicity from the pharmacy and she noticed the does was increased. She states she was not informed and wants to confirm if the dose is correct.

## 2022-05-20 NOTE — TELEPHONE ENCOUNTER
The dose sent was 0.75 mg subQ weekly  No med increase was suggested or sent per documentation   Thanks

## 2022-05-20 NOTE — TELEPHONE ENCOUNTER
----- Message from Loren Palma sent at 5/20/2022 10:54 AM CDT -----  Regarding: Advice  Type:  Needs Medical Advice    Who Called: Patient  Symptoms (please be specific):    How long has patient had these symptoms:    Pharmacy name and phone #:    Would the patient rather a call back or a response via [facility]?   Best Call Back Number:   Additional Information: Waiting on call from nurse about her trulcity doseage

## 2022-06-16 DIAGNOSIS — D50.9 IRON DEFICIENCY ANEMIA, UNSPECIFIED IRON DEFICIENCY ANEMIA TYPE: ICD-10-CM

## 2022-06-16 DIAGNOSIS — E53.8 COBALAMIN DEFICIENCY: Primary | ICD-10-CM

## 2022-06-16 PROCEDURE — 83883 ASSAY NEPHELOMETRY NOT SPEC: CPT | Performed by: INTERNAL MEDICINE

## 2022-06-21 PROBLEM — E53.8 VITAMIN B12 DEFICIENCY: Status: ACTIVE | Noted: 2022-06-21

## 2022-06-21 PROBLEM — D50.9 IRON DEFICIENCY ANEMIA: Status: ACTIVE | Noted: 2022-06-21

## 2022-06-21 NOTE — PROGRESS NOTES
Subjective:       Patient ID: Edyta Rubio is a 76 y.o. female.    PCP: Dr. Darline Rooney     1. Iron Deficiency Anemia since 2017     2. Vitamin B12 deficiency Diagnosed 9/2020     Work-up:  9/29/20--JULES +, RF negative, tanya negative, LDH normal, haptoglobin high, ferritin 31, vitamin B12 180 (low), methylmalonic acid high, serum SPEP/ZAHARA with no M-spike but ZAHRAA with IgG lambda monoclonal protein (not quantified), IgG normal 1494, IgA 368, IgM 92 (all normal).  2/25/22-vitamin B12 >1000, methylmalonic acid normal, homocysteine normal, JULES equivocal, LDH low, haptoglobin high.   6/16/22--Repeat serum ZAHRAA with no M-spike, K/L ratio normal.      Ferritin:  10/27/17--21  07/26/19--22  09/29/20--24  11/17/20--384  02/19/20--214  06/21/21--123  10/22/21--109  02/25/22--45  06/16/22--24.53     Imaging:  MRI lumbar spine w/o contrast done 3/9/21--Lumbar degenerative disc disease and spondylosis with interval progression level by level discussed above.     Procedures:  1. EGD 6/10/19--Esophagus: GE junction at approximately 40 cm.  Normal mucosa. Stomach: Fundus, cardia, body and antrum appeared unremarkable. Duodenum: Duodenal bulb, 2nd and 3rd portion of the duodenum appeared unremarkable to the extent of exam..     2. Colonoscopy 6/10/19--2-3 mm polyp noted around hepatic flexure, biopsies were obtained and pathology showed tubular adenoma, another 2-3 mm polyp which seemed to be hyperplastic was noted to in the distal transverse colon, biopsy showed polypoid fragment of benign colonic type mucosa with focal intramucosal lymphoid aggregate, there was a lipomatous prominence noted in the distal transverse colon as well.  This was not biopsied and appeared to be smooth surface.  Terminal ileum was intubated during procedure and appeared to be unremarkable.  Cecum and ascending colon were unremarkable.  Descending colon, sigmoid colon and rectum appeared unremarkable except grade 1-2 internal hemorrhoids noted on withdrawal  of the scope.     Treatment history:  Feraheme 10/12/20 and 10/19/20.     Current treatment:   1. Feraheme X 2 doses to start 7/1/22  2. Oral iron OTC daily  3. Vitamin B12 oral daily started 9/2020        Chief Complaint: Other Misc (Pt reports that her thighs always hurt.)    HPI   Patient presents for follow-up of anemia. Ferritin is lower. She has not noticed any bleeding but she has felt more tired lately. Discussed another round of IV iron and she is in agreement. She was finally seen by Rheumatology Dr. Owens and diagnosed with SLE. She is now on hydroxychloroquine.    Past Medical History:   Diagnosis Date    Alopecia     Diabetic peripheral neuropathy     DM (diabetes mellitus)     GERD (gastroesophageal reflux disease)     HTN (hypertension)     Hypercholesterolemia     DAMIAN (iron deficiency anemia)     Memory impairment     Osteopenia     PVD (peripheral vascular disease)     Unspecified osteoarthritis, unspecified site     Vitamin B12 deficiency       Review of patient's allergies indicates:  No Known Allergies   Current Outpatient Medications on File Prior to Visit   Medication Sig Dispense Refill    atorvastatin (LIPITOR) 40 MG tablet       dulaglutide (TRULICITY) 1.5 mg/0.5 mL pen injector Inject 1.5 mg into the skin every 7 days. 4 pen 11    hydroCHLOROthiazide (MICROZIDE) 12.5 mg capsule Take 12.5 mg by mouth once daily.      hydrOXYchloroQUINE (PLAQUENIL) 200 mg tablet TAKE 1 TABLET BY MOUTH TWICE A DAY WITH FOOD OR MILK      ibandronate (BONIVA) 150 mg tablet       KLOR-CON 8 8 mEq TbSR TAKE 1 TABLET BY MOUTH EVERY DAY 90 tablet 11    losartan (COZAAR) 50 MG tablet       metFORMIN (GLUCOPHAGE) 500 MG tablet Take 1,000 mg by mouth 2 (two) times daily.      pioglitazone (ACTOS) 15 MG tablet Take 15 mg by mouth once daily.      RESTASIS 0.05 % ophthalmic emulsion Place 1 drop into both eyes 2 (two) times daily.      tiZANidine (ZANAFLEX) 2 MG tablet Take 2 mg by mouth every  evening.       No current facility-administered medications on file prior to visit.      Review of Systems   Constitutional: Positive for fatigue. Negative for appetite change, fever and unexpected weight change.   HENT: Negative for mouth sores.    Eyes: Negative.    Respiratory: Negative for cough and shortness of breath.    Cardiovascular: Negative for chest pain and leg swelling.   Gastrointestinal: Negative for abdominal distention, abdominal pain, constipation, diarrhea, nausea, vomiting and reflux.   Genitourinary: Negative for difficulty urinating, dysuria and hematuria.   Musculoskeletal: Negative for arthralgias and back pain.   Integumentary:  Negative for rash.   Neurological: Negative for weakness and headaches.   Hematological: Negative for adenopathy.   Psychiatric/Behavioral: Negative for sleep disturbance. The patient is not nervous/anxious.               Physical Exam  Constitutional:       Appearance: Normal appearance.   HENT:      Head: Normocephalic.      Nose: Nose normal.      Mouth/Throat:      Mouth: Mucous membranes are moist.   Eyes:      Extraocular Movements: Extraocular movements intact.      Conjunctiva/sclera: Conjunctivae normal.   Cardiovascular:      Rate and Rhythm: Normal rate and regular rhythm.   Pulmonary:      Effort: Pulmonary effort is normal.      Breath sounds: Normal breath sounds.   Abdominal:      General: Bowel sounds are normal. There is no distension.      Palpations: Abdomen is soft.      Tenderness: There is no abdominal tenderness.   Musculoskeletal:         General: Normal range of motion.   Skin:     General: Skin is warm.   Neurological:      General: No focal deficit present.      Mental Status: She is alert and oriented to person, place, and time.   Psychiatric:         Mood and Affect: Mood normal.         Judgment: Judgment normal.         Lab Visit on 06/16/2022   Component Date Value    Ferritin Level 06/16/2022 24.53     Iron Binding Capacity Un*  06/16/2022 238     Iron Level 06/16/2022 40 (A)    Iron Binding Capacity To* 06/16/2022 278     Iron Saturation 06/16/2022 14 (A)    [place] Free Light Chain 06/16/2022 5.15 (A)    Lambda Free Light Chain 06/16/2022 3.69 (A)    Kappa/Lambda FLC Ratio 06/16/2022 1.40     WBC 06/16/2022 5.8     RBC 06/16/2022 3.08 (A)    Hgb 06/16/2022 9.4 (A)    Hct 06/16/2022 29.3 (A)    MCV 06/16/2022 95.1 (A)    MCH 06/16/2022 30.5     MCHC 06/16/2022 32.1 (A)    RDW 06/16/2022 13.7     Platelet 06/16/2022 232     MPV 06/16/2022 10.0     Neut % 06/16/2022 63.7     Lymph % 06/16/2022 29.2     Mono % 06/16/2022 5.4     Eos % 06/16/2022 1.0     Basophil % 06/16/2022 0.5     Lymph # 06/16/2022 1.69     Neut # 06/16/2022 3.7     Mono # 06/16/2022 0.31     Eos # 06/16/2022 0.06     Baso # 06/16/2022 0.03     IG# 06/16/2022 0.01     IG% 06/16/2022 0.2     IgG Level 06/16/2022 1,113.00     IgA Level 06/16/2022 256.0     IgM Level 06/16/2022 65.0     Protein Total 06/16/2022 7.1     Albumin Level 06/16/2022 3.9     Globulin 06/16/2022 3.2     Albumin/Globulin Ratio 06/16/2022 1.2     Albumin, SPEP 06/16/2022 45.56 (A)    Alpha 1 Glob % 06/16/2022 3.65     Alpha 1 Glob 06/16/2022 0.26     Alpha 2 Glob% 06/16/2022 15.75 (A)    Alpha 2 Glob 06/16/2022 1.12 (A)    Beta Glob % 06/16/2022 17.01     Beta Glob 06/16/2022 1.21     Gamma Globulin % 06/16/2022 18.03     Gamma Globulin 06/16/2022 1.28     M Balta % 06/16/2022      M Balta 06/16/2022      Pathology Review 06/16/2022                      Value:SPEP:  Total protein and globulin are both within reference range.  A/G ratio is normal.      Serum protein electrophoresis shows an increased alpha-2-globulin fraction and normal distribution of the remaining main protein fractions.    No monoclonal proteins are detected.    Nakul Pandey MD                Assessment:       1. Other iron deficiency anemia    2. Vitamin B12 deficiency         Plan:        Patient with long-standing anemia.  She has been taking oral iron for many years without complete response.   EGD/Colonoscopy done in 6/2019 were unremarkable aside from hemorrhoids, but nothing to explain iron deficiency.  Patient completed IV Feraheme X 2 doses 10/19/20 and started oral vitamin B12 in 9/2020.  Suspect this is a chronic problem for her. Looking back on her labs, anemia has been present at least since 2016 with Hgb around 10g/dL.  Patient says she has been anemic for 50+ years.     Recent labs show anemia slightly worse with Hgb 9.4g/dL and ferritin is lower 24.  Patient is more tired lately.  Will schedule for another round of IV Feraheme to start on 7/1/22 X 2 doses.  RTC 8 weeks for follow-up with repeat labs and will repeat B12 level as well.   She had prior GI work-up in 2019 that was unremarkable. Do not think we need to repeat since she has not had any obvious bleeding. Suspect she has some malabsorption.     Continue oral B12.   Continue oral iron.  JULES+ in 2020. She finally saw Rheumatology, diagnosed with SLE, now on hydroxychloroquine.  Looking back on labs from 9/2020. She had SPEP negative for M-spike but ZAHRAA showed possible monoclonal IgG lambda protein, not quantified. Repeat serum ZAHRAA and K/L ratio normal with no monoclonal proteins from 6/2022. No need to repeat.       All questions answered at this time.        Maru Jenkins MD         Therapy Plan Information  Medications  ferumoxytoL (FERAHEME) 510 mg in dextrose 5 % 100 mL IVPB  510 mg, Intravenous, Every visit  Flushes  heparin, porcine (PF) 100 unit/mL injection flush 500 Units  500 Units, Intravenous, PRN  sodium chloride 0.9% flush 10 mL  10 mL, Intravenous, Every visit  sodium chloride 0.9% 100 mL flush bag  Intravenous, Every visit  PRN Medications  EPINEPHrine (EPIPEN) 0.3 mg/0.3 mL pen injection 0.3 mg  0.3 mg, Intramuscular, PRN  diphenhydrAMINE injection 50 mg  50 mg, Intravenous, PRN  methylPREDNISolone sodium  succinate injection 125 mg  125 mg, Intravenous, PRN  sodium chloride 0.9% bolus 1,000 mL  1,000 mL, Intravenous, PRN

## 2022-06-24 RX ORDER — LOSARTAN POTASSIUM 50 MG/1
TABLET ORAL
COMMUNITY
Start: 2022-06-03 | End: 2022-08-04

## 2022-06-24 RX ORDER — CYCLOSPORINE 0.5 MG/ML
1 EMULSION OPHTHALMIC 2 TIMES DAILY
COMMUNITY
Start: 2022-04-19

## 2022-06-24 RX ORDER — HYDROXYCHLOROQUINE SULFATE 200 MG/1
TABLET, FILM COATED ORAL
COMMUNITY
Start: 2022-05-26 | End: 2022-07-12 | Stop reason: SDUPTHER

## 2022-06-24 RX ORDER — IBANDRONATE SODIUM 150 MG/1
TABLET, FILM COATED ORAL
COMMUNITY
Start: 2022-03-16 | End: 2023-03-08

## 2022-06-24 RX ORDER — TIZANIDINE 2 MG/1
2 TABLET ORAL NIGHTLY
COMMUNITY
Start: 2022-04-27 | End: 2022-07-12 | Stop reason: SDUPTHER

## 2022-06-24 RX ORDER — HYDROCHLOROTHIAZIDE 12.5 MG/1
12.5 CAPSULE ORAL DAILY
COMMUNITY
Start: 2022-05-08 | End: 2022-11-14

## 2022-06-24 RX ORDER — ATORVASTATIN CALCIUM 40 MG/1
TABLET, FILM COATED ORAL
COMMUNITY
Start: 2022-05-14 | End: 2022-07-18

## 2022-06-24 RX ORDER — PIOGLITAZONEHYDROCHLORIDE 15 MG/1
15 TABLET ORAL DAILY
COMMUNITY
Start: 2022-02-05 | End: 2022-08-16 | Stop reason: SDUPTHER

## 2022-06-24 RX ORDER — METFORMIN HYDROCHLORIDE 500 MG/1
1000 TABLET ORAL 2 TIMES DAILY
COMMUNITY
Start: 2022-05-16 | End: 2023-02-16

## 2022-06-27 ENCOUNTER — OFFICE VISIT (OUTPATIENT)
Dept: HEMATOLOGY/ONCOLOGY | Facility: CLINIC | Age: 77
End: 2022-06-27
Payer: MEDICARE

## 2022-06-27 VITALS
BODY MASS INDEX: 34.12 KG/M2 | HEIGHT: 62 IN | SYSTOLIC BLOOD PRESSURE: 123 MMHG | OXYGEN SATURATION: 100 % | DIASTOLIC BLOOD PRESSURE: 74 MMHG | WEIGHT: 185.44 LBS | HEART RATE: 80 BPM | RESPIRATION RATE: 14 BRPM | TEMPERATURE: 98 F

## 2022-06-27 DIAGNOSIS — D50.8 OTHER IRON DEFICIENCY ANEMIA: ICD-10-CM

## 2022-06-27 DIAGNOSIS — E53.8 VITAMIN B12 DEFICIENCY: ICD-10-CM

## 2022-06-27 PROCEDURE — 99999 PR PBB SHADOW E&M-EST. PATIENT-LVL IV: CPT | Mod: PBBFAC,,, | Performed by: INTERNAL MEDICINE

## 2022-06-27 PROCEDURE — 3078F PR MOST RECENT DIASTOLIC BLOOD PRESSURE < 80 MM HG: ICD-10-PCS | Mod: CPTII,S$GLB,, | Performed by: INTERNAL MEDICINE

## 2022-06-27 PROCEDURE — 99999 PR PBB SHADOW E&M-EST. PATIENT-LVL IV: ICD-10-PCS | Mod: PBBFAC,,, | Performed by: INTERNAL MEDICINE

## 2022-06-27 PROCEDURE — 99214 OFFICE O/P EST MOD 30 MIN: CPT | Mod: S$GLB,,, | Performed by: INTERNAL MEDICINE

## 2022-06-27 PROCEDURE — 4010F ACE/ARB THERAPY RXD/TAKEN: CPT | Mod: CPTII,S$GLB,, | Performed by: INTERNAL MEDICINE

## 2022-06-27 PROCEDURE — 4010F PR ACE/ARB THEARPY RXD/TAKEN: ICD-10-PCS | Mod: CPTII,S$GLB,, | Performed by: INTERNAL MEDICINE

## 2022-06-27 PROCEDURE — 1159F MED LIST DOCD IN RCRD: CPT | Mod: CPTII,S$GLB,, | Performed by: INTERNAL MEDICINE

## 2022-06-27 PROCEDURE — 3074F SYST BP LT 130 MM HG: CPT | Mod: CPTII,S$GLB,, | Performed by: INTERNAL MEDICINE

## 2022-06-27 PROCEDURE — 1160F RVW MEDS BY RX/DR IN RCRD: CPT | Mod: CPTII,S$GLB,, | Performed by: INTERNAL MEDICINE

## 2022-06-27 PROCEDURE — 3074F PR MOST RECENT SYSTOLIC BLOOD PRESSURE < 130 MM HG: ICD-10-PCS | Mod: CPTII,S$GLB,, | Performed by: INTERNAL MEDICINE

## 2022-06-27 PROCEDURE — 3008F BODY MASS INDEX DOCD: CPT | Mod: CPTII,S$GLB,, | Performed by: INTERNAL MEDICINE

## 2022-06-27 PROCEDURE — 3008F PR BODY MASS INDEX (BMI) DOCUMENTED: ICD-10-PCS | Mod: CPTII,S$GLB,, | Performed by: INTERNAL MEDICINE

## 2022-06-27 PROCEDURE — 1159F PR MEDICATION LIST DOCUMENTED IN MEDICAL RECORD: ICD-10-PCS | Mod: CPTII,S$GLB,, | Performed by: INTERNAL MEDICINE

## 2022-06-27 PROCEDURE — 3078F DIAST BP <80 MM HG: CPT | Mod: CPTII,S$GLB,, | Performed by: INTERNAL MEDICINE

## 2022-06-27 PROCEDURE — 1160F PR REVIEW ALL MEDS BY PRESCRIBER/CLIN PHARMACIST DOCUMENTED: ICD-10-PCS | Mod: CPTII,S$GLB,, | Performed by: INTERNAL MEDICINE

## 2022-06-27 PROCEDURE — 1125F PR PAIN SEVERITY QUANTIFIED, PAIN PRESENT: ICD-10-PCS | Mod: CPTII,S$GLB,, | Performed by: INTERNAL MEDICINE

## 2022-06-27 PROCEDURE — 99214 PR OFFICE/OUTPT VISIT, EST, LEVL IV, 30-39 MIN: ICD-10-PCS | Mod: S$GLB,,, | Performed by: INTERNAL MEDICINE

## 2022-06-27 PROCEDURE — 1125F AMNT PAIN NOTED PAIN PRSNT: CPT | Mod: CPTII,S$GLB,, | Performed by: INTERNAL MEDICINE

## 2022-06-27 RX ORDER — HEPARIN 100 UNIT/ML
500 SYRINGE INTRAVENOUS
Status: CANCELLED | OUTPATIENT
Start: 2022-07-01

## 2022-06-27 RX ORDER — SODIUM CHLORIDE 9 MG/ML
10 INJECTION, SOLUTION INTRAMUSCULAR; INTRAVENOUS; SUBCUTANEOUS
Status: CANCELLED | OUTPATIENT
Start: 2022-07-01

## 2022-06-27 RX ORDER — ACETAMINOPHEN 325 MG/1
650 TABLET ORAL
Status: CANCELLED | OUTPATIENT
Start: 2022-07-01

## 2022-06-27 RX ORDER — DIPHENHYDRAMINE HYDROCHLORIDE 50 MG/ML
50 INJECTION INTRAMUSCULAR; INTRAVENOUS ONCE AS NEEDED
Status: CANCELLED | OUTPATIENT
Start: 2022-07-01

## 2022-06-27 RX ORDER — EPINEPHRINE 0.3 MG/.3ML
0.3 INJECTION SUBCUTANEOUS ONCE AS NEEDED
Status: CANCELLED | OUTPATIENT
Start: 2022-07-01

## 2022-06-27 RX ORDER — SODIUM CHLORIDE 0.9 % (FLUSH) 0.9 %
10 SYRINGE (ML) INJECTION
Status: CANCELLED | OUTPATIENT
Start: 2022-07-01

## 2022-06-27 RX ORDER — METHYLPREDNISOLONE SOD SUCC 125 MG
125 VIAL (EA) INJECTION ONCE AS NEEDED
Status: CANCELLED | OUTPATIENT
Start: 2022-07-01

## 2022-07-01 ENCOUNTER — INFUSION (OUTPATIENT)
Dept: INFUSION THERAPY | Facility: HOSPITAL | Age: 77
End: 2022-07-01
Attending: INTERNAL MEDICINE
Payer: MEDICARE

## 2022-07-01 VITALS
BODY MASS INDEX: 34.12 KG/M2 | WEIGHT: 185.44 LBS | RESPIRATION RATE: 18 BRPM | SYSTOLIC BLOOD PRESSURE: 105 MMHG | TEMPERATURE: 98 F | HEIGHT: 62 IN | DIASTOLIC BLOOD PRESSURE: 67 MMHG | HEART RATE: 80 BPM

## 2022-07-01 DIAGNOSIS — D50.8 OTHER IRON DEFICIENCY ANEMIA: Primary | ICD-10-CM

## 2022-07-01 PROCEDURE — 96367 TX/PROPH/DG ADDL SEQ IV INF: CPT

## 2022-07-01 PROCEDURE — 96365 THER/PROPH/DIAG IV INF INIT: CPT

## 2022-07-01 PROCEDURE — 96366 THER/PROPH/DIAG IV INF ADDON: CPT

## 2022-07-01 PROCEDURE — 63600175 PHARM REV CODE 636 W HCPCS: Performed by: INTERNAL MEDICINE

## 2022-07-01 PROCEDURE — 96375 TX/PRO/DX INJ NEW DRUG ADDON: CPT

## 2022-07-01 PROCEDURE — 25000003 PHARM REV CODE 250: Performed by: INTERNAL MEDICINE

## 2022-07-01 RX ORDER — ACETAMINOPHEN 325 MG/1
650 TABLET ORAL
Status: CANCELLED | OUTPATIENT
Start: 2022-07-01

## 2022-07-01 RX ORDER — ACETAMINOPHEN 325 MG/1
650 TABLET ORAL
Status: COMPLETED | OUTPATIENT
Start: 2022-07-01 | End: 2022-07-01

## 2022-07-01 RX ORDER — SODIUM CHLORIDE 9 MG/ML
10 INJECTION, SOLUTION INTRAMUSCULAR; INTRAVENOUS; SUBCUTANEOUS
Status: DISCONTINUED | OUTPATIENT
Start: 2022-07-01 | End: 2022-07-01 | Stop reason: HOSPADM

## 2022-07-01 RX ORDER — HEPARIN 100 UNIT/ML
500 SYRINGE INTRAVENOUS
Status: CANCELLED | OUTPATIENT
Start: 2022-07-01

## 2022-07-01 RX ORDER — SODIUM CHLORIDE 9 MG/ML
10 INJECTION, SOLUTION INTRAMUSCULAR; INTRAVENOUS; SUBCUTANEOUS
Status: CANCELLED | OUTPATIENT
Start: 2022-07-01

## 2022-07-01 RX ADMIN — FAMOTIDINE 20 MG: 10 INJECTION, SOLUTION INTRAVENOUS at 11:07

## 2022-07-01 RX ADMIN — SODIUM CHLORIDE 1000 MG: 0.9 INJECTION, SOLUTION INTRAVENOUS at 11:07

## 2022-07-01 RX ADMIN — SODIUM CHLORIDE 25 MG: 9 INJECTION, SOLUTION INTRAVENOUS at 10:07

## 2022-07-01 RX ADMIN — ACETAMINOPHEN 650 MG: 325 TABLET ORAL at 11:07

## 2022-07-01 RX ADMIN — DIPHENHYDRAMINE HYDROCHLORIDE 50 MG: 50 INJECTION INTRAMUSCULAR; INTRAVENOUS at 11:07

## 2022-07-12 ENCOUNTER — OFFICE VISIT (OUTPATIENT)
Dept: RHEUMATOLOGY | Facility: CLINIC | Age: 77
End: 2022-07-12
Payer: MEDICARE

## 2022-07-12 VITALS
TEMPERATURE: 98 F | HEIGHT: 62 IN | RESPIRATION RATE: 16 BRPM | SYSTOLIC BLOOD PRESSURE: 124 MMHG | BODY MASS INDEX: 33.43 KG/M2 | HEART RATE: 80 BPM | DIASTOLIC BLOOD PRESSURE: 66 MMHG | WEIGHT: 181.63 LBS | OXYGEN SATURATION: 97 %

## 2022-07-12 DIAGNOSIS — M79.7 FIBROMYALGIA SYNDROME: ICD-10-CM

## 2022-07-12 DIAGNOSIS — D50.8 OTHER IRON DEFICIENCY ANEMIA: ICD-10-CM

## 2022-07-12 DIAGNOSIS — M32.9 SYSTEMIC LUPUS ERYTHEMATOSUS, UNSPECIFIED SLE TYPE, UNSPECIFIED ORGAN INVOLVEMENT STATUS: Primary | ICD-10-CM

## 2022-07-12 DIAGNOSIS — E53.8 VITAMIN B12 DEFICIENCY: ICD-10-CM

## 2022-07-12 PROCEDURE — 3078F PR MOST RECENT DIASTOLIC BLOOD PRESSURE < 80 MM HG: ICD-10-PCS | Mod: CPTII,S$GLB,, | Performed by: INTERNAL MEDICINE

## 2022-07-12 PROCEDURE — 3074F PR MOST RECENT SYSTOLIC BLOOD PRESSURE < 130 MM HG: ICD-10-PCS | Mod: CPTII,S$GLB,, | Performed by: INTERNAL MEDICINE

## 2022-07-12 PROCEDURE — 4010F PR ACE/ARB THEARPY RXD/TAKEN: ICD-10-PCS | Mod: CPTII,S$GLB,, | Performed by: INTERNAL MEDICINE

## 2022-07-12 PROCEDURE — 3078F DIAST BP <80 MM HG: CPT | Mod: CPTII,S$GLB,, | Performed by: INTERNAL MEDICINE

## 2022-07-12 PROCEDURE — 1160F RVW MEDS BY RX/DR IN RCRD: CPT | Mod: CPTII,S$GLB,, | Performed by: INTERNAL MEDICINE

## 2022-07-12 PROCEDURE — 99214 PR OFFICE/OUTPT VISIT, EST, LEVL IV, 30-39 MIN: ICD-10-PCS | Mod: S$GLB,,, | Performed by: INTERNAL MEDICINE

## 2022-07-12 PROCEDURE — 99999 PR PBB SHADOW E&M-EST. PATIENT-LVL IV: CPT | Mod: PBBFAC,,, | Performed by: INTERNAL MEDICINE

## 2022-07-12 PROCEDURE — 99999 PR PBB SHADOW E&M-EST. PATIENT-LVL IV: ICD-10-PCS | Mod: PBBFAC,,, | Performed by: INTERNAL MEDICINE

## 2022-07-12 PROCEDURE — 1101F PR PT FALLS ASSESS DOC 0-1 FALLS W/OUT INJ PAST YR: ICD-10-PCS | Mod: CPTII,S$GLB,, | Performed by: INTERNAL MEDICINE

## 2022-07-12 PROCEDURE — 3288F PR FALLS RISK ASSESSMENT DOCUMENTED: ICD-10-PCS | Mod: CPTII,S$GLB,, | Performed by: INTERNAL MEDICINE

## 2022-07-12 PROCEDURE — 1125F AMNT PAIN NOTED PAIN PRSNT: CPT | Mod: CPTII,S$GLB,, | Performed by: INTERNAL MEDICINE

## 2022-07-12 PROCEDURE — 3074F SYST BP LT 130 MM HG: CPT | Mod: CPTII,S$GLB,, | Performed by: INTERNAL MEDICINE

## 2022-07-12 PROCEDURE — 1125F PR PAIN SEVERITY QUANTIFIED, PAIN PRESENT: ICD-10-PCS | Mod: CPTII,S$GLB,, | Performed by: INTERNAL MEDICINE

## 2022-07-12 PROCEDURE — 1159F PR MEDICATION LIST DOCUMENTED IN MEDICAL RECORD: ICD-10-PCS | Mod: CPTII,S$GLB,, | Performed by: INTERNAL MEDICINE

## 2022-07-12 PROCEDURE — 99214 OFFICE O/P EST MOD 30 MIN: CPT | Mod: S$GLB,,, | Performed by: INTERNAL MEDICINE

## 2022-07-12 PROCEDURE — 1159F MED LIST DOCD IN RCRD: CPT | Mod: CPTII,S$GLB,, | Performed by: INTERNAL MEDICINE

## 2022-07-12 PROCEDURE — 3288F FALL RISK ASSESSMENT DOCD: CPT | Mod: CPTII,S$GLB,, | Performed by: INTERNAL MEDICINE

## 2022-07-12 PROCEDURE — 4010F ACE/ARB THERAPY RXD/TAKEN: CPT | Mod: CPTII,S$GLB,, | Performed by: INTERNAL MEDICINE

## 2022-07-12 PROCEDURE — 1101F PT FALLS ASSESS-DOCD LE1/YR: CPT | Mod: CPTII,S$GLB,, | Performed by: INTERNAL MEDICINE

## 2022-07-12 PROCEDURE — 1160F PR REVIEW ALL MEDS BY PRESCRIBER/CLIN PHARMACIST DOCUMENTED: ICD-10-PCS | Mod: CPTII,S$GLB,, | Performed by: INTERNAL MEDICINE

## 2022-07-12 RX ORDER — TIZANIDINE 4 MG/1
4 TABLET ORAL NIGHTLY
Qty: 30 TABLET | Refills: 5 | Status: SHIPPED | OUTPATIENT
Start: 2022-07-12 | End: 2022-11-08 | Stop reason: SDUPTHER

## 2022-07-12 RX ORDER — HYDROXYCHLOROQUINE SULFATE 200 MG/1
TABLET, FILM COATED ORAL
Qty: 60 TABLET | Refills: 5 | Status: SHIPPED | OUTPATIENT
Start: 2022-07-12 | End: 2022-11-08 | Stop reason: SDUPTHER

## 2022-07-12 RX ORDER — FERROUS SULFATE 325(65) MG
325 TABLET, DELAYED RELEASE (ENTERIC COATED) ORAL DAILY
Qty: 30 TABLET | Refills: 5 | Status: SHIPPED | OUTPATIENT
Start: 2022-07-12 | End: 2022-11-08 | Stop reason: SDUPTHER

## 2022-07-12 NOTE — PROGRESS NOTES
"Subjective:       Patient ID: Edyta Rubio is a 77 y.o. female.    Chief Complaint: 3 month follow up (Bilat hand & foot numbness /^ pain bilat thighs)    The patient is complaining of joint pain involving the MCP PIP wrist elbow shoulders hips knees and ankles bilaterally.  The pain is 3/10 in intensity dull in quality and continuous.  That is associated with a morning stiffness lasting for more than 60 minutes.  Is also having difficulty maintaining a good night of sleep.  This has been associated with myalgias.  Muscle aches are 5/10 in intensity dull in quality and continuous.  They are associated with fatigue.  No fever no chills no others.      Review of Systems   Constitutional: Negative for appetite change, chills and fever.   HENT: Negative for congestion, ear pain, mouth sores, nosebleeds and trouble swallowing.    Eyes: Negative for photophobia and discharge.   Respiratory: Negative for chest tightness and shortness of breath.    Cardiovascular: Negative for chest pain.   Gastrointestinal: Negative for abdominal pain and vomiting.   Endocrine: Negative.    Genitourinary: Negative for hematuria.   Musculoskeletal:        As per HPI   Skin: Negative for rash.   Neurological: Negative for weakness.         Objective:   /66 (BP Location: Right arm, Patient Position: Sitting, BP Method: Large (Automatic))   Pulse 80   Temp 97.7 °F (36.5 °C)   Resp 16   Ht 5' 2" (1.575 m)   Wt 82.4 kg (181 lb 9.6 oz)   SpO2 97%   BMI 33.22 kg/m²      Physical Exam   Constitutional: She is oriented to person, place, and time. She appears well-developed and well-nourished. No distress.   HENT:   Head: Normocephalic and atraumatic.   Right Ear: External ear normal.   Left Ear: External ear normal.   Eyes: Pupils are equal, round, and reactive to light.   Cardiovascular: Normal rate, regular rhythm and normal heart sounds.   Pulmonary/Chest: Breath sounds normal.   Abdominal: Soft. There is no abdominal tenderness. "   Musculoskeletal:      Right shoulder: Tenderness present.      Left shoulder: Tenderness present.      Right elbow: Tenderness present.      Left elbow: Tenderness present.      Right wrist: Tenderness present.      Left wrist: Tenderness present.      Cervical back: Neck supple.      Right hip: Tenderness present.      Left hip: Tenderness present.      Right knee: Tenderness present.      Left knee: Tenderness present.      Right ankle: Tenderness present.      Left ankle: Tenderness present.   Lymphadenopathy:     She has no cervical adenopathy.   Neurological: She is alert and oriented to person, place, and time. She displays normal reflexes. No cranial nerve deficit or sensory deficit. She exhibits normal muscle tone. Coordination normal.   Skin: No rash noted. No erythema.   Vitals reviewed.      Right Side Rheumatological Exam     The patient is tender to palpation of the shoulder, elbow, wrist, knee, 1st PIP, 1st MCP, 2nd PIP, 2nd MCP, 3rd PIP, 3rd MCP, 4th PIP, 4th MCP, 5th PIP, hip, ankle, 1st MTP, 2nd MTP, 3rd MTP, 4th MTP, 5th MTP, 1st toe IP, 2nd toe IP, 3rd toe IP, 4th toe IP and 5th toe IP    Left Side Rheumatological Exam     The patient is tender to palpation of the shoulder, elbow, wrist, knee, 1st PIP, 1st MCP, 2nd PIP, 2nd MCP, 3rd PIP, 3rd MCP, 4th PIP, 4th MCP, 5th PIP, 5th MCP, hip, ankle, 1st MTP, 2nd MTP, 3rd MTP, 4th MTP, 5th MTP, 1st toe IP, 2nd toe IP, 3rd toe IP, 4th toe IP and 5th toe IP.         Completed Fibromyalgia exam 18/18 tender points.  No data to display     Assessment:       1. Systemic lupus erythematosus, unspecified SLE type, unspecified organ involvement status    2. Fibromyalgia syndrome    3. Vitamin B12 deficiency    4. Other iron deficiency anemia            Plan:       Problem List Items Addressed This Visit        Oncology    Iron deficiency anemia    Relevant Medications    hydrOXYchloroQUINE (PLAQUENIL) 200 mg tablet    tiZANidine (ZANAFLEX) 4 MG tablet    folic  acid-vit B6-vit B12 2.5-25-2 mg (FOLBIC OR EQUIV) 2.5-25-2 mg Tab    ferrous sulfate 325 (65 FE) MG EC tablet       Endocrine    Vitamin B12 deficiency    Relevant Medications    hydrOXYchloroQUINE (PLAQUENIL) 200 mg tablet    tiZANidine (ZANAFLEX) 4 MG tablet    folic acid-vit B6-vit B12 2.5-25-2 mg (FOLBIC OR EQUIV) 2.5-25-2 mg Tab    ferrous sulfate 325 (65 FE) MG EC tablet      Other Visit Diagnoses     Systemic lupus erythematosus, unspecified SLE type, unspecified organ involvement status    -  Primary    Relevant Medications    hydrOXYchloroQUINE (PLAQUENIL) 200 mg tablet    tiZANidine (ZANAFLEX) 4 MG tablet    folic acid-vit B6-vit B12 2.5-25-2 mg (FOLBIC OR EQUIV) 2.5-25-2 mg Tab    ferrous sulfate 325 (65 FE) MG EC tablet    Fibromyalgia syndrome        Relevant Medications    hydrOXYchloroQUINE (PLAQUENIL) 200 mg tablet    tiZANidine (ZANAFLEX) 4 MG tablet    folic acid-vit B6-vit B12 2.5-25-2 mg (FOLBIC OR EQUIV) 2.5-25-2 mg Tab    ferrous sulfate 325 (65 FE) MG EC tablet

## 2022-08-16 DIAGNOSIS — E11.65 UNCONTROLLED TYPE 2 DIABETES MELLITUS WITH HYPERGLYCEMIA: Primary | ICD-10-CM

## 2022-08-16 NOTE — TELEPHONE ENCOUNTER
Patient called wanting to know if she should be still taking pioglitazone 15 mg. If yes currently need refills   Pharmacy told patient we declined rx

## 2022-08-17 ENCOUNTER — TELEPHONE (OUTPATIENT)
Dept: HEMATOLOGY/ONCOLOGY | Facility: CLINIC | Age: 77
End: 2022-08-17
Payer: MEDICARE

## 2022-08-17 ENCOUNTER — LAB VISIT (OUTPATIENT)
Dept: LAB | Facility: HOSPITAL | Age: 77
End: 2022-08-17
Attending: INTERNAL MEDICINE
Payer: MEDICARE

## 2022-08-17 DIAGNOSIS — E53.8 VITAMIN B12 DEFICIENCY: ICD-10-CM

## 2022-08-17 DIAGNOSIS — D50.8 OTHER IRON DEFICIENCY ANEMIA: ICD-10-CM

## 2022-08-17 LAB
BASOPHILS # BLD AUTO: 0.03 X10(3)/MCL (ref 0–0.2)
BASOPHILS NFR BLD AUTO: 0.5 %
EOSINOPHIL # BLD AUTO: 0.08 X10(3)/MCL (ref 0–0.9)
EOSINOPHIL NFR BLD AUTO: 1.3 %
ERYTHROCYTE [DISTWIDTH] IN BLOOD BY AUTOMATED COUNT: 14.5 % (ref 11.5–17)
FERRITIN SERPL-MCNC: 332.11 NG/ML (ref 4.63–204)
HCT VFR BLD AUTO: 28 % (ref 37–47)
HGB BLD-MCNC: 8.7 GM/DL (ref 12–16)
IMM GRANULOCYTES # BLD AUTO: 0.01 X10(3)/MCL (ref 0–0.04)
IMM GRANULOCYTES NFR BLD AUTO: 0.2 %
IRON SATN MFR SERPL: 29 % (ref 20–50)
IRON SERPL-MCNC: 59 UG/DL (ref 50–170)
LYMPHOCYTES # BLD AUTO: 2.05 X10(3)/MCL (ref 0.6–4.6)
LYMPHOCYTES NFR BLD AUTO: 33.7 %
MCH RBC QN AUTO: 30.2 PG (ref 27–31)
MCHC RBC AUTO-ENTMCNC: 31.1 MG/DL (ref 33–36)
MCV RBC AUTO: 97.2 FL (ref 80–94)
MONOCYTES # BLD AUTO: 0.46 X10(3)/MCL (ref 0.1–1.3)
MONOCYTES NFR BLD AUTO: 7.6 %
NEUTROPHILS # BLD AUTO: 3.5 X10(3)/MCL (ref 2.1–9.2)
NEUTROPHILS NFR BLD AUTO: 56.7 %
PLATELET # BLD AUTO: 202 X10(3)/MCL (ref 130–400)
PMV BLD AUTO: 10.6 FL (ref 7.4–10.4)
RBC # BLD AUTO: 2.88 X10(6)/MCL (ref 4.2–5.4)
TIBC SERPL-MCNC: 142 UG/DL (ref 70–310)
TIBC SERPL-MCNC: 201 UG/DL (ref 250–450)
TRANSFERRIN SERPL-MCNC: 184 MG/DL (ref 173–360)
VIT B12 SERPL-MCNC: 1396 PG/ML (ref 213–816)
WBC # SPEC AUTO: 6.1 X10(3)/MCL (ref 4.5–11.5)

## 2022-08-17 PROCEDURE — 83540 ASSAY OF IRON: CPT

## 2022-08-17 PROCEDURE — 36415 COLL VENOUS BLD VENIPUNCTURE: CPT

## 2022-08-17 PROCEDURE — 82607 VITAMIN B-12: CPT

## 2022-08-17 PROCEDURE — 85025 COMPLETE CBC W/AUTO DIFF WBC: CPT

## 2022-08-17 PROCEDURE — 82728 ASSAY OF FERRITIN: CPT

## 2022-08-17 RX ORDER — PIOGLITAZONEHYDROCHLORIDE 15 MG/1
15 TABLET ORAL DAILY
Qty: 30 TABLET | Refills: 3 | Status: SHIPPED | OUTPATIENT
Start: 2022-08-17 | End: 2022-11-08

## 2022-08-19 ENCOUNTER — TELEPHONE (OUTPATIENT)
Dept: HEMATOLOGY/ONCOLOGY | Facility: CLINIC | Age: 77
End: 2022-08-19
Payer: MEDICARE

## 2022-08-19 NOTE — TELEPHONE ENCOUNTER
When I called to confirm appt on Monday with Dr Jenkins, pt said that she called on Wednesday and cxl'd appt bc she will not be in town. Pt states that she will call when she returns to r/s.

## 2022-09-06 ENCOUNTER — TELEPHONE (OUTPATIENT)
Dept: FAMILY MEDICINE | Facility: CLINIC | Age: 77
End: 2022-09-06

## 2022-09-06 ENCOUNTER — TELEPHONE (OUTPATIENT)
Dept: FAMILY MEDICINE | Facility: CLINIC | Age: 77
End: 2022-09-06
Payer: MEDICARE

## 2022-09-06 DIAGNOSIS — E78.00 HYPERCHOLESTEROLEMIA: ICD-10-CM

## 2022-09-06 DIAGNOSIS — M81.0 OSTEOPOROSIS, UNSPECIFIED OSTEOPOROSIS TYPE, UNSPECIFIED PATHOLOGICAL FRACTURE PRESENCE: ICD-10-CM

## 2022-09-06 DIAGNOSIS — D50.8 OTHER IRON DEFICIENCY ANEMIA: ICD-10-CM

## 2022-09-06 DIAGNOSIS — E11.65 TYPE 2 DIABETES MELLITUS WITH HYPERGLYCEMIA, UNSPECIFIED WHETHER LONG TERM INSULIN USE: ICD-10-CM

## 2022-09-06 DIAGNOSIS — I10 HYPERTENSION, UNSPECIFIED TYPE: ICD-10-CM

## 2022-09-06 DIAGNOSIS — Z00.00 WELLNESS EXAMINATION: Primary | ICD-10-CM

## 2022-09-06 DIAGNOSIS — I73.9 PERIPHERAL ARTERIAL DISEASE: ICD-10-CM

## 2022-09-30 RX ORDER — DULAGLUTIDE 0.75 MG/.5ML
INJECTION, SOLUTION SUBCUTANEOUS
Refills: 5 | OUTPATIENT
Start: 2022-09-30

## 2022-09-30 NOTE — TELEPHONE ENCOUNTER
Please call patient- find out if dose is correct for trulicity and if refill is needed, I did send a refill for 1.5 mg dose recently but now received another refill request for 0.75. she should be on 1.5 mg subQ weekly  thanks

## 2022-10-03 ENCOUNTER — TELEPHONE (OUTPATIENT)
Dept: FAMILY MEDICINE | Facility: CLINIC | Age: 77
End: 2022-10-03
Payer: MEDICARE

## 2022-10-03 DIAGNOSIS — E11.65 TYPE 2 DIABETES MELLITUS WITH HYPERGLYCEMIA, UNSPECIFIED WHETHER LONG TERM INSULIN USE: Primary | ICD-10-CM

## 2022-10-03 NOTE — TELEPHONE ENCOUNTER
----- Message from Charity Betts sent at 10/3/2022  8:50 AM CDT -----  Regarding: Med Advice  .Type:  Needs Medical Advice    Who Called: Pt  Symptoms (please be specific):    How long has patient had these symptoms:    Pharmacy name and phone #:    Would the patient rather a call back or a response via [facility]? Call back  Best Call Back Number: [phone]  Additional Information: Pt wants to know if she needs to continue dulaglutide (TRULICITY) 1.5 mg/0.5 mL pen injector because the pharmacy has been trying to get in contact with clinic and has gotten no response.

## 2022-10-03 NOTE — TELEPHONE ENCOUNTER
Spoke to patient and she states she is taking Trulicity 0.75 mg. Back in May 2022  patient was informed that she did not have to increase Trulicity to 1.5 mg    Will remove from patient chart.

## 2022-10-04 RX ORDER — DULAGLUTIDE 0.75 MG/.5ML
0.75 INJECTION, SOLUTION SUBCUTANEOUS
Qty: 4 PEN | Refills: 11 | Status: SHIPPED | OUTPATIENT
Start: 2022-10-04 | End: 2023-05-01

## 2022-10-04 NOTE — TELEPHONE ENCOUNTER
I have signed for the following orders AND/OR meds. Please notify the patient and ask the patient to schedule the testing and/or information about any medications that were sent.    The correct dose was sent to pharmacy  Please call pharmacy to explain that she is to be on 0.75 mg subQ weekly, not the 1.5 mg dose  thanks    Medications Ordered This Encounter   Medications    dulaglutide (TRULICITY) 0.75 mg/0.5 mL pen injector     Sig: Inject 0.75 mg into the skin every 7 days.     Dispense:  4 pen     Refill:  11     No orders of the defined types were placed in this encounter.

## 2022-10-18 LAB
25(OH)D3+25(OH)D2 SERPL-MCNC: 43.3 NG/ML (ref 30–100)
ALBUMIN SERPL-MCNC: 4.1 G/DL (ref 3.7–4.7)
ALBUMIN/CREAT UR: 11 MG/G CREAT (ref 0–29)
ALBUMIN/GLOB SERPL: 1.6 {RATIO} (ref 1.2–2.2)
ALP SERPL-CCNC: 72 IU/L (ref 44–121)
ALT SERPL-CCNC: 15 IU/L (ref 0–32)
AST SERPL-CCNC: 19 IU/L (ref 0–40)
BASOPHILS # BLD AUTO: 0 X10E3/UL (ref 0–0.2)
BASOPHILS NFR BLD AUTO: 1 %
BILIRUB SERPL-MCNC: 0.2 MG/DL (ref 0–1.2)
BUN SERPL-MCNC: 19 MG/DL (ref 8–27)
BUN/CREAT SERPL: 24 (ref 12–28)
CALCIUM SERPL-MCNC: 9.5 MG/DL (ref 8.7–10.3)
CHLORIDE SERPL-SCNC: 108 MMOL/L (ref 96–106)
CHOLEST SERPL-MCNC: 138 MG/DL (ref 100–199)
CO2 SERPL-SCNC: 25 MMOL/L (ref 20–29)
CREAT SERPL-MCNC: 0.79 MG/DL (ref 0.57–1)
CREAT UR-MCNC: 118.4 MG/DL
EOSINOPHIL # BLD AUTO: 0.1 X10E3/UL (ref 0–0.4)
EOSINOPHIL NFR BLD AUTO: 2 %
ERYTHROCYTE [DISTWIDTH] IN BLOOD BY AUTOMATED COUNT: 13.4 % (ref 11.7–15.4)
EST. GFR  (NO RACE VARIABLE): 78 ML/MIN/1.73
GLOBULIN SER CALC-MCNC: 2.5 G/DL (ref 1.5–4.5)
GLUCOSE SERPL-MCNC: 123 MG/DL (ref 70–99)
HBA1C MFR BLD: 5.7 % (ref 4.8–5.6)
HCT VFR BLD AUTO: 27.8 % (ref 34–46.6)
HDLC SERPL-MCNC: 80 MG/DL
HGB BLD-MCNC: 8.8 G/DL (ref 11.1–15.9)
LDLC SERPL CALC-MCNC: 47 MG/DL (ref 0–99)
LYMPHOCYTES # BLD AUTO: 2 X10E3/UL (ref 0.7–3.1)
LYMPHOCYTES NFR BLD AUTO: 32 %
MCH RBC QN AUTO: 31.7 PG (ref 26.6–33)
MCHC RBC AUTO-ENTMCNC: 31.7 G/DL (ref 31.5–35.7)
MCV RBC AUTO: 100 FL (ref 79–97)
MICROALBUMIN UR-MCNC: 12.5 UG/ML
MONOCYTES # BLD AUTO: 0.4 X10E3/UL (ref 0.1–0.9)
MONOCYTES NFR BLD AUTO: 7 %
NEUTROPHILS # BLD AUTO: 3.7 X10E3/UL (ref 1.4–7)
NEUTROPHILS NFR BLD AUTO: 58 %
PLATELET # BLD AUTO: 211 X10E3/UL (ref 150–450)
POTASSIUM SERPL-SCNC: 5 MMOL/L (ref 3.5–5.2)
PROT SERPL-MCNC: 6.6 G/DL (ref 6–8.5)
RBC # BLD AUTO: 2.78 X10E6/UL (ref 3.77–5.28)
SODIUM SERPL-SCNC: 147 MMOL/L (ref 134–144)
SPECIMEN STATUS REPORT: NORMAL
TRIGL SERPL-MCNC: 48 MG/DL (ref 0–149)
VLDLC SERPL CALC-MCNC: 11 MG/DL (ref 5–40)
WBC # BLD AUTO: 6.3 X10E3/UL (ref 3.4–10.8)

## 2022-11-01 ENCOUNTER — OFFICE VISIT (OUTPATIENT)
Dept: FAMILY MEDICINE | Facility: CLINIC | Age: 77
End: 2022-11-01
Payer: MEDICARE

## 2022-11-01 VITALS
TEMPERATURE: 98 F | OXYGEN SATURATION: 99 % | WEIGHT: 182.13 LBS | DIASTOLIC BLOOD PRESSURE: 52 MMHG | HEIGHT: 62 IN | HEART RATE: 68 BPM | RESPIRATION RATE: 18 BRPM | BODY MASS INDEX: 33.51 KG/M2 | SYSTOLIC BLOOD PRESSURE: 114 MMHG

## 2022-11-01 DIAGNOSIS — M79.652 PAIN OF LEFT THIGH: ICD-10-CM

## 2022-11-01 DIAGNOSIS — M32.9 SYSTEMIC LUPUS ERYTHEMATOSUS, UNSPECIFIED SLE TYPE, UNSPECIFIED ORGAN INVOLVEMENT STATUS: ICD-10-CM

## 2022-11-01 DIAGNOSIS — K21.9 GASTROESOPHAGEAL REFLUX DISEASE, UNSPECIFIED WHETHER ESOPHAGITIS PRESENT: ICD-10-CM

## 2022-11-01 DIAGNOSIS — E11.59 HYPERTENSION ASSOCIATED WITH TYPE 2 DIABETES MELLITUS: ICD-10-CM

## 2022-11-01 DIAGNOSIS — M79.7 FIBROMYALGIA SYNDROME: ICD-10-CM

## 2022-11-01 DIAGNOSIS — M81.0 AGE-RELATED OSTEOPOROSIS WITHOUT CURRENT PATHOLOGICAL FRACTURE: ICD-10-CM

## 2022-11-01 DIAGNOSIS — I15.2 HYPERTENSION ASSOCIATED WITH TYPE 2 DIABETES MELLITUS: ICD-10-CM

## 2022-11-01 DIAGNOSIS — I73.9 PAD (PERIPHERAL ARTERY DISEASE): ICD-10-CM

## 2022-11-01 DIAGNOSIS — D50.8 OTHER IRON DEFICIENCY ANEMIA: ICD-10-CM

## 2022-11-01 DIAGNOSIS — E78.5 HYPERLIPIDEMIA ASSOCIATED WITH TYPE 2 DIABETES MELLITUS: ICD-10-CM

## 2022-11-01 DIAGNOSIS — E11.69 HYPERLIPIDEMIA ASSOCIATED WITH TYPE 2 DIABETES MELLITUS: ICD-10-CM

## 2022-11-01 DIAGNOSIS — Z00.00 WELLNESS EXAMINATION: Primary | ICD-10-CM

## 2022-11-01 DIAGNOSIS — Z79.899 DRUG-INDUCED IMMUNODEFICIENCY: ICD-10-CM

## 2022-11-01 DIAGNOSIS — Z12.31 ENCOUNTER FOR SCREENING MAMMOGRAM FOR BREAST CANCER: ICD-10-CM

## 2022-11-01 DIAGNOSIS — E11.65 TYPE 2 DIABETES MELLITUS WITH HYPERGLYCEMIA, WITHOUT LONG-TERM CURRENT USE OF INSULIN: ICD-10-CM

## 2022-11-01 DIAGNOSIS — D84.821 DRUG-INDUCED IMMUNODEFICIENCY: ICD-10-CM

## 2022-11-01 PROCEDURE — 1160F PR REVIEW ALL MEDS BY PRESCRIBER/CLIN PHARMACIST DOCUMENTED: ICD-10-PCS | Mod: CPTII,,, | Performed by: INTERNAL MEDICINE

## 2022-11-01 PROCEDURE — 3044F PR MOST RECENT HEMOGLOBIN A1C LEVEL <7.0%: ICD-10-PCS | Mod: CPTII,,, | Performed by: INTERNAL MEDICINE

## 2022-11-01 PROCEDURE — 1101F PR PT FALLS ASSESS DOC 0-1 FALLS W/OUT INJ PAST YR: ICD-10-PCS | Mod: CPTII,,, | Performed by: INTERNAL MEDICINE

## 2022-11-01 PROCEDURE — 3288F PR FALLS RISK ASSESSMENT DOCUMENTED: ICD-10-PCS | Mod: CPTII,,, | Performed by: INTERNAL MEDICINE

## 2022-11-01 PROCEDURE — 3044F HG A1C LEVEL LT 7.0%: CPT | Mod: CPTII,,, | Performed by: INTERNAL MEDICINE

## 2022-11-01 PROCEDURE — 4010F ACE/ARB THERAPY RXD/TAKEN: CPT | Mod: CPTII,,, | Performed by: INTERNAL MEDICINE

## 2022-11-01 PROCEDURE — 3078F PR MOST RECENT DIASTOLIC BLOOD PRESSURE < 80 MM HG: ICD-10-PCS | Mod: CPTII,,, | Performed by: INTERNAL MEDICINE

## 2022-11-01 PROCEDURE — 3078F DIAST BP <80 MM HG: CPT | Mod: CPTII,,, | Performed by: INTERNAL MEDICINE

## 2022-11-01 PROCEDURE — 3074F PR MOST RECENT SYSTOLIC BLOOD PRESSURE < 130 MM HG: ICD-10-PCS | Mod: CPTII,,, | Performed by: INTERNAL MEDICINE

## 2022-11-01 PROCEDURE — G0439 PPPS, SUBSEQ VISIT: HCPCS | Mod: ,,, | Performed by: INTERNAL MEDICINE

## 2022-11-01 PROCEDURE — G0439 PR MEDICARE ANNUAL WELLNESS SUBSEQUENT VISIT: ICD-10-PCS | Mod: ,,, | Performed by: INTERNAL MEDICINE

## 2022-11-01 PROCEDURE — 3288F FALL RISK ASSESSMENT DOCD: CPT | Mod: CPTII,,, | Performed by: INTERNAL MEDICINE

## 2022-11-01 PROCEDURE — 4010F PR ACE/ARB THEARPY RXD/TAKEN: ICD-10-PCS | Mod: CPTII,,, | Performed by: INTERNAL MEDICINE

## 2022-11-01 PROCEDURE — 1159F MED LIST DOCD IN RCRD: CPT | Mod: CPTII,,, | Performed by: INTERNAL MEDICINE

## 2022-11-01 PROCEDURE — 1101F PT FALLS ASSESS-DOCD LE1/YR: CPT | Mod: CPTII,,, | Performed by: INTERNAL MEDICINE

## 2022-11-01 PROCEDURE — 1159F PR MEDICATION LIST DOCUMENTED IN MEDICAL RECORD: ICD-10-PCS | Mod: CPTII,,, | Performed by: INTERNAL MEDICINE

## 2022-11-01 PROCEDURE — 3074F SYST BP LT 130 MM HG: CPT | Mod: CPTII,,, | Performed by: INTERNAL MEDICINE

## 2022-11-01 PROCEDURE — 1160F RVW MEDS BY RX/DR IN RCRD: CPT | Mod: CPTII,,, | Performed by: INTERNAL MEDICINE

## 2022-11-01 RX ORDER — NAPROXEN SODIUM 220 MG/1
81 TABLET, FILM COATED ORAL DAILY
COMMUNITY

## 2022-11-01 NOTE — PROGRESS NOTES
Patient ID: [id number]     Chief Complaint: Medicare Wellness      HPI:     Edyta Rubio is a 77 y.o. female here today for a Medicare Wellness.       SLE, Fibromyalgia:  Dr. Owens with rheumatology following  Patient is on plaquenil, zanaflex  Says she still has pain in to joints  F/u visit scheduled next week    ANEMIA worse; s/p EGD and colonoscopy, hematology Dr. Maru Jenkins following; DAMIAN and B12 deficiency. on supplement for both Says that she felt sick from iron supplement and was not taking daily; H/H without much improvement- says she wants to see new hematologist    MRI lumbar spine 3/21 with OA and disc bulging lumbar/sacral area  Patient with persistent lower back and thigh pain  never saw any specialists to f/u her MRI lumbar spine and pain issues  She says she knows we referred her to Dr. Ferguson to evaluate and she say she wasn't ready to go through with this  She also declines PT and neurosurgery evaluation      PAD:  reports chronic lower extremity pain in the thighs  she quit smoking in 1987  has been on statin + aspirin  reports pain is daily    Type 2 DM:  A1c stable at 5.7  eye exam: 2/21 Dr. Hummel, negative for DR  *reminded patient to reschedule with Dr. Hummel for her 2022 eye exam  foot exam: completed with no DN 3/2/2021  current medications:  Current meds: Metformin 1000 mg BID Trulicity 0.75 mg subQ weekly and pioglitazone  Patient with more nausea/vomiting lately- was unsure of etiology  on statin  on ARB    HTN:  controlled; no chest pain or headache  current med: HCTZ 12.5 mg daily; losartan 50 mg daily    HLD: on statin therapy; no myalgias    GERD: on PPI; sx controlled    Osteoporosis: on calcium/vitamin D, Last DEXA 2021; pending to start Boniva            colonoscopy: 2014 with 1 polyp; 6/2019 with polyps Dr. Moore  mammogram every 2 years 08/31/2020 no evidence of malignancy  *ordered 11/2022   no flu shot, declines  stress done a few years ago; does not see a  cardiologist  prevnar 1/23/19  pneumovax 8/31/2020  shingrix vaccine: due- will work on at next visit with Rx  DEXA: 4/2021 Osteoporosis: advised calcium, vitaminD and to start Boniva once dental work completed  COVID 19 vaccine: Moderna x 3 doses  ADL: complete and intact  eyes: glasses, due for vision exam, appt scheduled  Advance Directives: not yet completed  HCPOA: spouse  Hearing: passes whisper test at 5 feet       Opioid Screening: Patient medication list reviewed, patient is not taking prescription opioids. Patient is not using additional opioids than prescribed. Patient is at low risk of substance abuse based on this opioid use history.       ----------------------------  Alopecia  Diabetic peripheral neuropathy  DM (diabetes mellitus)  GERD (gastroesophageal reflux disease)  HTN (hypertension)  Hypercholesterolemia  DAMIAN (iron deficiency anemia)  Memory impairment  Osteopenia  PVD (peripheral vascular disease)  Unspecified osteoarthritis, unspecified site  Vitamin B12 deficiency     Past Surgical History:   Procedure Laterality Date    EYE SURGERY      HYSTERECTOMY      MOLE REMOVAL      chest       Review of patient's allergies indicates:  No Known Allergies    Outpatient Medications Marked as Taking for the 11/1/22 encounter (Office Visit) with Darline Rooney MD   Medication Sig Dispense Refill    ACCU-CHEK HANSEL PLUS TEST STRP Strp TEST BLOOD SUGAR EVERY  strip 1    aspirin 81 MG Chew Take 81 mg by mouth once daily.      atorvastatin (LIPITOR) 40 MG tablet TAKE 1 TABLET AT BEDTIME 90 tablet 3    dulaglutide (TRULICITY) 0.75 mg/0.5 mL pen injector Inject 0.75 mg into the skin every 7 days. 4 pen 11    ferrous sulfate 325 (65 FE) MG EC tablet Take 1 tablet (325 mg total) by mouth once daily. 30 tablet 5    folic acid-vit B6-vit B12 2.5-25-2 mg (FOLBIC OR EQUIV) 2.5-25-2 mg Tab Take 1 tablet by mouth once daily. After breakfast 30 tablet 5    hydroCHLOROthiazide (MICROZIDE) 12.5 mg  capsule Take 12.5 mg by mouth once daily.      hydrOXYchloroQUINE (PLAQUENIL) 200 mg tablet TAKE 1 TABLET BY MOUTH TWICE A DAY WITH FOOD OR MILK 60 tablet 5    KLOR-CON 8 8 mEq TbSR TAKE 1 TABLET BY MOUTH EVERY DAY 90 tablet 11    losartan (COZAAR) 50 MG tablet TAKE 1 TABLET EVERY DAY 90 tablet 3    metFORMIN (GLUCOPHAGE) 500 MG tablet Take 1,000 mg by mouth 2 (two) times daily.      pioglitazone (ACTOS) 15 MG tablet Take 1 tablet (15 mg total) by mouth once daily. 30 tablet 3    RESTASIS 0.05 % ophthalmic emulsion Place 1 drop into both eyes 2 (two) times daily.      tiZANidine (ZANAFLEX) 4 MG tablet Take 1 tablet (4 mg total) by mouth every evening. 30 tablet 5       Social History     Socioeconomic History    Marital status:    Tobacco Use    Smoking status: Former    Smokeless tobacco: Never   Substance and Sexual Activity    Alcohol use: Not Currently    Drug use: Not Currently        History reviewed. No pertinent family history.     Patient Care Team:  Darline Rooney MD as PCP - General (Internal Medicine)       Subjective:     Review of Systems   Constitutional:  Negative for chills and fever.   Eyes:  Negative for blurred vision.   Gastrointestinal:  Positive for nausea and vomiting.   Musculoskeletal:  Positive for joint pain and myalgias.       Patient Reported Health Risk Assessment  What [age] 70-81  Are you male or female?: Female  During the past four weeks, how much have you been bothered by emotional problems such as feeling anxious, depressed, irritable, sad, or downhearted and blue?: Not at all  During the past five weeks, has your physical and/or emotional health limited your social activities with family, friends, neighbors, or groups?: Not at all  During the past four weeks, how much bodily pain have you generally had?: Mild pain  During the past four weeks, was someone available to help if you needed and wanted help?: Yes, as much as I wanted  During the past four  weeks, what was the hardest physical activity you could do for at least two minutes?: Light  Can you get to places out of walking distance without help?  (For example, can you travel alone on buses or taxis, or drive your own car?): Yes  Can you go shopping for groceries or clothes without someone's help?: No  Can you prepare your own meals?: Yes  Can you do your own housework without help?: Yes  Because of any health problems, do you need the help of another person with your personal care needs such as eating, bathing, dressing, or getting around the house?: No  Can you handle your own money without help?: Yes  During the past four weeks, how would you rate your health in general?: Good  How have things been going for you during the past four weeks?: Pretty well  Are you having difficulties driving your car?: Sometimes  Do you always fasten your seat belt when you are in a car?: Yes, usually  How often in the past four weeks have you been bothered by falling or dizzy when standing up?: Seldom  How often in the past four weeks have you been bothered by sexual problems?: Never  How often in the past four weeks have you been bothered by trouble eating well?: Never  How often in the past four weeks have you been bothered by teeth or denture problems?: Never  How often in the past four weeks have you been bothered with problems using the telephone?: Never  How often in the past four weeks have you been bothered by tiredness or fatigue?: Sometimes  Have you fallen two or more times in the past year?: No  Are you afraid of falling?: Yes  Are you a smoker?: No  During the past four weeks, how many drinks of wine, beer, or other alcoholic beverages did you have?: No alcohol at all  Do you exercise for about 20 minutes three or more days a week?: No, I usually do not exercise this much  Have you been given any information to help you with hazards in your house that might hurt you?: Yes  Have you been given any information to  "help you with keeping track of your medications?: Yes  How often do you have trouble taking medicines the way you've been told to take them?: Sometimes I take them as prescribed  How confident are you that you can control and manage most of your health problems?: Somewhat confident  What is your race? (Check all that apply.):     Objective:     BP (!) 114/52   Pulse 68   Temp 98.3 °F (36.8 °C) (Temporal)   Resp 18   Ht 5' 2" (1.575 m)   Wt 82.6 kg (182 lb 1.6 oz)   SpO2 99%   BMI 33.31 kg/m²     Physical Exam  Vitals and nursing note reviewed.   Constitutional:       General: She is not in acute distress.     Appearance: She is well-developed. She is not diaphoretic.   HENT:      Head: Normocephalic and atraumatic.   Eyes:      General:         Right eye: No discharge.         Left eye: No discharge.      Conjunctiva/sclera: Conjunctivae normal.      Pupils: Pupils are equal, round, and reactive to light.   Neck:      Thyroid: No thyromegaly.   Cardiovascular:      Rate and Rhythm: Normal rate and regular rhythm.      Heart sounds: Normal heart sounds. No murmur heard.  Pulmonary:      Effort: Pulmonary effort is normal. No respiratory distress.      Breath sounds: Normal breath sounds. No wheezing or rales.   Musculoskeletal:      Cervical back: Normal range of motion and neck supple.   Lymphadenopathy:      Cervical: No cervical adenopathy.   Skin:     General: Skin is warm and dry.   Neurological:      Mental Status: She is alert and oriented to person, place, and time.   Psychiatric:         Mood and Affect: Mood normal.         Behavior: Behavior normal.         No flowsheet data found.  Fall Risk Assessment - Outpatient 11/1/2022 7/12/2022 7/1/2022   Mobility Status Ambulatory Ambulatory Ambulatory   Number of falls 0 0 0   Identified as fall risk 0 0 0           Depression Screening  Over the past two weeks, has the patient felt down, depressed, or hopeless?: No  Over the past two " weeks, has the patient felt little interest or pleasure in doing things?: No  Functional Ability/Safety Screening  Was the patient's timed Up & Go test unsteady or longer than 30 seconds?: No  Does the patient need help with phone, transportation, shopping, preparing meals, housework, laundry, meds, or managing money?: No  Does the patient's home have rugs in the hallway, lack grab bars in the bathroom, lack handrails on the stairs or have poor lighting?: No  Have you noticed any hearing difficulties?: No  Cognitive Function (Assessed through direct observation with due consideration of information obtained by way of patient reports and/or concerns raised by family, friends, caretakers, or others)    Does the patient repeat questions/statements in the same day?: No  Does the patient have trouble remembering the date, year, and time?: Yes  Does the patient have difficulty managing finances?: No  Does the patient have a decreased sense of direction?: Yes  Assessment/Plan:     1. Wellness examination  Fasting labs reviewed in detail today  Mammogram ordered  Declines vaccinations   2. Other iron deficiency anemia  -     Ambulatory referral/consult to Hematology / Oncology; Future; Expected date: 11/08/2022  Patient with persistent anemia  She is now taking iron tablets but past few months but did stop because of some issues with nausea  Recommend to f/u with oncology   3. Encounter for screening mammogram for breast cancer  -     Mammo Digital Screening Bilat w/ [facility]; Future; Expected date: 11/01/2022    4. Systemic lupus erythematosus, unspecified SLE type, unspecified organ involvement status  Stable, still with some joint pain  Continue plaquenil  F/u with rheumatology  5. Fibromyalgia syndrome  Stable, f/u with rheumatology  6. PAD (peripheral artery disease)  Stable, continue aspirin and statin  7. Pain of left thigh  Unchanged suspected from radicular pain from lower back  She declines PT, pain management,  medication management and surgical evaluation  8. Type 2 diabetes mellitus with hyperglycemia, without long-term current use of insulin  Stable, A1c is 5.7  She has more nausea/vomiting  Recommend to stop the trulicity for atleast 2 weeks  If the nausea/vomiting resolves, then do not restart and notify me  Eye exam due now, advised patient to call Dr. Hummel to schedule  9. Hypertension associated with type 2 diabetes mellitus  Stable, continue current Rx medication, low sodium diet advised  10. Hyperlipidemia associated with type 2 diabetes mellitus  Stable, continue statin therapy, low fat, ADA diet advised  11. Gastroesophageal reflux disease, unspecified whether esophagitis present  Stable, continue curren tRx  12. Age-related osteoporosis without current pathological fracture  Stable, continue boniva  Repeat in 2023  13. Drug-induced immunodeficiency   Stable, continue plaquenil  She is due for eye exam this year- advised to call dr. Hummel to schedule  F/u with Dr. Owens with rheumatology this month for SLE f/u      Medicare Annual Wellness and Personalized Prevention Plan:   Fall Risk + Home Safety + Hearing Impairment + Depression Screen + Opioid and Substance Abuse Screening + Cognitive Impairment Screen + Health Risk Assessment all reviewed.     Health Maintenance Topics with due status: Not Due       Topic Last Completion Date    Lipid Panel 10/17/2022      The patient's Health Maintenance was reviewed and the following appears to be due at this time:   Health Maintenance Due   Topic Date Due    Hepatitis C Screening  Never done    COVID-19 Vaccine (1) Never done    TETANUS VACCINE  Never done    Colorectal Cancer Screening  Never done    Shingles Vaccine (1 of 2) Never done    DEXA Scan  02/01/2020    Influenza Vaccine (1) Never done       Advance Care Planning   Patient is not interested in advance care planning discussion.           Follow up in about 6 months (around 5/1/2023) for Follow Up -  Chronic Conditions. In addition to their scheduled follow up, the patient has also been instructed to follow up on as needed basis.

## 2022-11-03 ENCOUNTER — DOCUMENTATION ONLY (OUTPATIENT)
Dept: FAMILY MEDICINE | Facility: CLINIC | Age: 77
End: 2022-11-03
Payer: MEDICARE

## 2022-11-08 ENCOUNTER — OFFICE VISIT (OUTPATIENT)
Dept: RHEUMATOLOGY | Facility: CLINIC | Age: 77
End: 2022-11-08
Payer: MEDICARE

## 2022-11-08 VITALS
TEMPERATURE: 99 F | WEIGHT: 180.81 LBS | SYSTOLIC BLOOD PRESSURE: 130 MMHG | OXYGEN SATURATION: 100 % | HEIGHT: 63 IN | HEART RATE: 83 BPM | RESPIRATION RATE: 18 BRPM | DIASTOLIC BLOOD PRESSURE: 70 MMHG | BODY MASS INDEX: 32.04 KG/M2

## 2022-11-08 DIAGNOSIS — I73.9 PAD (PERIPHERAL ARTERY DISEASE): ICD-10-CM

## 2022-11-08 DIAGNOSIS — M32.9 SYSTEMIC LUPUS ERYTHEMATOSUS, UNSPECIFIED SLE TYPE, UNSPECIFIED ORGAN INVOLVEMENT STATUS: Primary | ICD-10-CM

## 2022-11-08 DIAGNOSIS — E53.8 VITAMIN B12 DEFICIENCY: ICD-10-CM

## 2022-11-08 DIAGNOSIS — D50.8 OTHER IRON DEFICIENCY ANEMIA: ICD-10-CM

## 2022-11-08 DIAGNOSIS — M79.7 FIBROMYALGIA SYNDROME: ICD-10-CM

## 2022-11-08 PROCEDURE — 99999 PR PBB SHADOW E&M-EST. PATIENT-LVL IV: CPT | Mod: PBBFAC,,, | Performed by: INTERNAL MEDICINE

## 2022-11-08 PROCEDURE — 3288F FALL RISK ASSESSMENT DOCD: CPT | Mod: CPTII,S$GLB,, | Performed by: INTERNAL MEDICINE

## 2022-11-08 PROCEDURE — 3066F NEPHROPATHY DOC TX: CPT | Mod: CPTII,S$GLB,, | Performed by: INTERNAL MEDICINE

## 2022-11-08 PROCEDURE — 99214 PR OFFICE/OUTPT VISIT, EST, LEVL IV, 30-39 MIN: ICD-10-PCS | Mod: S$GLB,,, | Performed by: INTERNAL MEDICINE

## 2022-11-08 PROCEDURE — 99214 OFFICE O/P EST MOD 30 MIN: CPT | Mod: S$GLB,,, | Performed by: INTERNAL MEDICINE

## 2022-11-08 PROCEDURE — 99999 PR PBB SHADOW E&M-EST. PATIENT-LVL IV: ICD-10-PCS | Mod: PBBFAC,,, | Performed by: INTERNAL MEDICINE

## 2022-11-08 PROCEDURE — 1159F PR MEDICATION LIST DOCUMENTED IN MEDICAL RECORD: ICD-10-PCS | Mod: CPTII,S$GLB,, | Performed by: INTERNAL MEDICINE

## 2022-11-08 PROCEDURE — 4010F PR ACE/ARB THEARPY RXD/TAKEN: ICD-10-PCS | Mod: CPTII,S$GLB,, | Performed by: INTERNAL MEDICINE

## 2022-11-08 PROCEDURE — 3075F SYST BP GE 130 - 139MM HG: CPT | Mod: CPTII,S$GLB,, | Performed by: INTERNAL MEDICINE

## 2022-11-08 PROCEDURE — 3078F DIAST BP <80 MM HG: CPT | Mod: CPTII,S$GLB,, | Performed by: INTERNAL MEDICINE

## 2022-11-08 PROCEDURE — 3061F PR NEG MICROALBUMINURIA RESULT DOCUMENTED/REVIEW: ICD-10-PCS | Mod: CPTII,S$GLB,, | Performed by: INTERNAL MEDICINE

## 2022-11-08 PROCEDURE — 3288F PR FALLS RISK ASSESSMENT DOCUMENTED: ICD-10-PCS | Mod: CPTII,S$GLB,, | Performed by: INTERNAL MEDICINE

## 2022-11-08 PROCEDURE — 3061F NEG MICROALBUMINURIA REV: CPT | Mod: CPTII,S$GLB,, | Performed by: INTERNAL MEDICINE

## 2022-11-08 PROCEDURE — 1101F PR PT FALLS ASSESS DOC 0-1 FALLS W/OUT INJ PAST YR: ICD-10-PCS | Mod: CPTII,S$GLB,, | Performed by: INTERNAL MEDICINE

## 2022-11-08 PROCEDURE — 4010F ACE/ARB THERAPY RXD/TAKEN: CPT | Mod: CPTII,S$GLB,, | Performed by: INTERNAL MEDICINE

## 2022-11-08 PROCEDURE — 3066F PR DOCUMENTATION OF TREATMENT FOR NEPHROPATHY: ICD-10-PCS | Mod: CPTII,S$GLB,, | Performed by: INTERNAL MEDICINE

## 2022-11-08 PROCEDURE — 3078F PR MOST RECENT DIASTOLIC BLOOD PRESSURE < 80 MM HG: ICD-10-PCS | Mod: CPTII,S$GLB,, | Performed by: INTERNAL MEDICINE

## 2022-11-08 PROCEDURE — 3044F HG A1C LEVEL LT 7.0%: CPT | Mod: CPTII,S$GLB,, | Performed by: INTERNAL MEDICINE

## 2022-11-08 PROCEDURE — 1125F AMNT PAIN NOTED PAIN PRSNT: CPT | Mod: CPTII,S$GLB,, | Performed by: INTERNAL MEDICINE

## 2022-11-08 PROCEDURE — 1101F PT FALLS ASSESS-DOCD LE1/YR: CPT | Mod: CPTII,S$GLB,, | Performed by: INTERNAL MEDICINE

## 2022-11-08 PROCEDURE — 1125F PR PAIN SEVERITY QUANTIFIED, PAIN PRESENT: ICD-10-PCS | Mod: CPTII,S$GLB,, | Performed by: INTERNAL MEDICINE

## 2022-11-08 PROCEDURE — 3075F PR MOST RECENT SYSTOLIC BLOOD PRESS GE 130-139MM HG: ICD-10-PCS | Mod: CPTII,S$GLB,, | Performed by: INTERNAL MEDICINE

## 2022-11-08 PROCEDURE — 3044F PR MOST RECENT HEMOGLOBIN A1C LEVEL <7.0%: ICD-10-PCS | Mod: CPTII,S$GLB,, | Performed by: INTERNAL MEDICINE

## 2022-11-08 PROCEDURE — 1159F MED LIST DOCD IN RCRD: CPT | Mod: CPTII,S$GLB,, | Performed by: INTERNAL MEDICINE

## 2022-11-08 RX ORDER — HYDROXYCHLOROQUINE SULFATE 200 MG/1
TABLET, FILM COATED ORAL
Qty: 60 TABLET | Refills: 5 | Status: SHIPPED | OUTPATIENT
Start: 2022-11-08 | End: 2023-03-08 | Stop reason: SDUPTHER

## 2022-11-08 RX ORDER — TIZANIDINE 2 MG/1
2 TABLET ORAL NIGHTLY
Qty: 30 TABLET | Refills: 5 | Status: SHIPPED | OUTPATIENT
Start: 2022-11-08 | End: 2023-03-08 | Stop reason: SDUPTHER

## 2022-11-08 RX ORDER — DICLOFENAC SODIUM 10 MG/G
2 GEL TOPICAL 4 TIMES DAILY
Qty: 100 G | Refills: 5 | Status: SHIPPED | OUTPATIENT
Start: 2022-11-08 | End: 2023-03-08 | Stop reason: SDUPTHER

## 2022-11-08 RX ORDER — FERROUS SULFATE 325(65) MG
325 TABLET, DELAYED RELEASE (ENTERIC COATED) ORAL WEEKLY
Qty: 5 TABLET | Refills: 5 | Status: SHIPPED | OUTPATIENT
Start: 2022-11-08 | End: 2023-03-08

## 2022-11-08 NOTE — PROGRESS NOTES
"Subjective:       Patient ID: Edyta Rubio is a 77 y.o. female.    Chief Complaint: Follow-up (Left thigh pain , lower back pain )    The patient is complaining of joint pain involving the MCP PIP wrist elbow shoulders hips knees and ankles bilaterally.  The pain is 7/10 in intensity dull in quality and continuous.  That is associated with a morning stiffness lasting for less than 60 minutes.  Is also having difficulty maintaining a good night of sleep.  This has been associated with myalgias.  Muscle aches are 8/10 in intensity dull in quality and continuous.  They are associated with fatigue.  No fever no chills no others.      Review of Systems   Constitutional:  Negative for appetite change, chills and fever.   HENT:  Negative for congestion, ear pain, mouth sores, nosebleeds and trouble swallowing.    Eyes:  Negative for photophobia and discharge.   Respiratory:  Negative for chest tightness and shortness of breath.    Cardiovascular:  Negative for chest pain.   Gastrointestinal:  Negative for abdominal pain and vomiting.   Endocrine: Negative.    Genitourinary:  Negative for hematuria.   Musculoskeletal:         As per HPI   Skin:  Negative for rash.   Neurological:  Negative for weakness.       Objective:   /70 (BP Location: Right arm, Patient Position: Sitting, BP Method: Medium (Manual))   Pulse 83   Temp 98.6 °F (37 °C) (Oral)   Resp 18   Ht 5' 3" (1.6 m)   Wt 82 kg (180 lb 12.8 oz)   SpO2 100%   BMI 32.03 kg/m²      Physical Exam   Constitutional: She is oriented to person, place, and time. She appears well-developed and well-nourished. No distress.   HENT:   Head: Normocephalic and atraumatic.   Right Ear: External ear normal.   Left Ear: External ear normal.   Eyes: Pupils are equal, round, and reactive to light.   Cardiovascular: Normal rate, regular rhythm and normal heart sounds.   Pulmonary/Chest: Breath sounds normal.   Abdominal: Soft. There is no abdominal tenderness. "   Musculoskeletal:      Right shoulder: Tenderness present.      Left shoulder: Tenderness present.      Right elbow: Tenderness present.      Left elbow: Tenderness present.      Right wrist: Tenderness present.      Left wrist: Tenderness present.      Cervical back: Neck supple.      Right hip: Tenderness present.      Left hip: Tenderness present.      Right knee: Tenderness present.      Left knee: Tenderness present.      Right ankle: Tenderness present.      Left ankle: Tenderness present.   Lymphadenopathy:     She has no cervical adenopathy.   Neurological: She is alert and oriented to person, place, and time. She displays normal reflexes. No cranial nerve deficit or sensory deficit. She exhibits normal muscle tone. Coordination normal.   Skin: No rash noted. No erythema.   Vitals reviewed.      Right Side Rheumatological Exam     The patient is tender to palpation of the shoulder, elbow, wrist, knee, 1st PIP, 1st MCP, 2nd PIP, 2nd MCP, 3rd PIP, 3rd MCP, 4th PIP, 4th MCP, 5th PIP, hip, ankle, 1st MTP, 2nd MTP, 3rd MTP, 4th MTP, 5th MTP, 1st toe IP, 2nd toe IP, 3rd toe IP, 4th toe IP and 5th toe IP    Left Side Rheumatological Exam     The patient is tender to palpation of the shoulder, elbow, wrist, knee, 1st PIP, 1st MCP, 2nd PIP, 2nd MCP, 3rd PIP, 3rd MCP, 4th PIP, 4th MCP, 5th PIP, 5th MCP, hip, ankle, 1st MTP, 2nd MTP, 3rd MTP, 4th MTP, 5th MTP, 1st toe IP, 2nd toe IP, 3rd toe IP, 4th toe IP and 5th toe IP.       Completed Fibromyalgia exam 11/18 tender points.  No data to display     Assessment:       1. Systemic lupus erythematosus, unspecified SLE type, unspecified organ involvement status    2. Fibromyalgia syndrome    3. Vitamin B12 deficiency    4. Other iron deficiency anemia    5. PAD (peripheral artery disease)            Medication List with Changes/Refills   New Medications    DICLOFENAC SODIUM (VOLTAREN) 1 % GEL    Apply 2 g topically 4 (four) times daily.       Start Date: 11/8/2022 End  Date: --   Current Medications    ACCU-CHEK HANSEL PLUS TEST STRP STRP    TEST BLOOD SUGAR EVERY DAY       Start Date: 7/13/2022 End Date: --    ASPIRIN 81 MG CHEW    Take 81 mg by mouth once daily.       Start Date: --        End Date: --    ATORVASTATIN (LIPITOR) 40 MG TABLET    TAKE 1 TABLET AT BEDTIME       Start Date: 7/18/2022 End Date: --    DULAGLUTIDE (TRULICITY) 0.75 MG/0.5 ML PEN INJECTOR    Inject 0.75 mg into the skin every 7 days.       Start Date: 10/4/2022 End Date: 10/4/2023    HYDROCHLOROTHIAZIDE (MICROZIDE) 12.5 MG CAPSULE    Take 12.5 mg by mouth once daily.       Start Date: 5/8/2022  End Date: --    IBANDRONATE (BONIVA) 150 MG TABLET           Start Date: 3/16/2022 End Date: --    KLOR-CON 8 8 MEQ TBSR    TAKE 1 TABLET BY MOUTH EVERY DAY       Start Date: 5/10/2022 End Date: --    LOSARTAN (COZAAR) 50 MG TABLET    TAKE 1 TABLET EVERY DAY       Start Date: 8/4/2022  End Date: --    METFORMIN (GLUCOPHAGE) 500 MG TABLET    Take 1,000 mg by mouth 2 (two) times daily.       Start Date: 5/16/2022 End Date: --    RESTASIS 0.05 % OPHTHALMIC EMULSION    Place 1 drop into both eyes 2 (two) times daily.       Start Date: 4/19/2022 End Date: --   Changed and/or Refilled Medications    Modified Medication Previous Medication    FERROUS SULFATE 325 (65 FE) MG EC TABLET ferrous sulfate 325 (65 FE) MG EC tablet       Take 1 tablet (325 mg total) by mouth once a week.    Take 1 tablet (325 mg total) by mouth once daily.       Start Date: 11/8/2022 End Date: --    Start Date: 7/12/2022 End Date: 11/8/2022    HYDROXYCHLOROQUINE (PLAQUENIL) 200 MG TABLET hydrOXYchloroQUINE (PLAQUENIL) 200 mg tablet       TAKE 1 TABLET BY MOUTH TWICE A DAY WITH FOOD OR MILK    TAKE 1 TABLET BY MOUTH TWICE A DAY WITH FOOD OR MILK       Start Date: 11/8/2022 End Date: --    Start Date: 7/12/2022 End Date: 11/8/2022    TIZANIDINE (ZANAFLEX) 2 MG TABLET tiZANidine (ZANAFLEX) 4 MG tablet       Take 1 tablet (2 mg total) by mouth nightly.     Take 1 tablet (4 mg total) by mouth every evening.       Start Date: 11/8/2022 End Date: --    Start Date: 7/12/2022 End Date: 11/8/2022   Discontinued Medications    FOLIC ACID-VIT B6-VIT B12 2.5-25-2 MG (FOLBIC OR EQUIV) 2.5-25-2 MG TAB    Take 1 tablet by mouth once daily. After breakfast       Start Date: 7/12/2022 End Date: 11/8/2022    PIOGLITAZONE (ACTOS) 15 MG TABLET    Take 1 tablet (15 mg total) by mouth once daily.       Start Date: 8/17/2022 End Date: 11/8/2022         Plan:       Problem List Items Addressed This Visit          Cardiac/Vascular    PAD (peripheral artery disease)    Relevant Medications    ferrous sulfate 325 (65 FE) MG EC tablet    tiZANidine (ZANAFLEX) 2 MG tablet    hydrOXYchloroQUINE (PLAQUENIL) 200 mg tablet    diclofenac sodium (VOLTAREN) 1 % Gel       Immunology/Multi System    SLE (systemic lupus erythematosus) - Primary    Relevant Medications    ferrous sulfate 325 (65 FE) MG EC tablet    tiZANidine (ZANAFLEX) 2 MG tablet    hydrOXYchloroQUINE (PLAQUENIL) 200 mg tablet    diclofenac sodium (VOLTAREN) 1 % Gel       Oncology    Iron deficiency anemia    Relevant Medications    ferrous sulfate 325 (65 FE) MG EC tablet    tiZANidine (ZANAFLEX) 2 MG tablet    hydrOXYchloroQUINE (PLAQUENIL) 200 mg tablet    diclofenac sodium (VOLTAREN) 1 % Gel       Endocrine    Vitamin B12 deficiency    Relevant Medications    ferrous sulfate 325 (65 FE) MG EC tablet    tiZANidine (ZANAFLEX) 2 MG tablet    hydrOXYchloroQUINE (PLAQUENIL) 200 mg tablet    diclofenac sodium (VOLTAREN) 1 % Gel       Orthopedic    Fibromyalgia syndrome    Relevant Medications    ferrous sulfate 325 (65 FE) MG EC tablet    tiZANidine (ZANAFLEX) 2 MG tablet    hydrOXYchloroQUINE (PLAQUENIL) 200 mg tablet    diclofenac sodium (VOLTAREN) 1 % Gel

## 2022-11-14 ENCOUNTER — TELEPHONE (OUTPATIENT)
Dept: FAMILY MEDICINE | Facility: CLINIC | Age: 77
End: 2022-11-14

## 2022-11-14 NOTE — TELEPHONE ENCOUNTER
Spoke with pt  She stated that she does not want to see Dr Jenkins any longer  I advised pt that we can refer her elsewhere  She stated that it needs to be somewhere in her network   I advised pt to call her insurance to see which Dr's are in her network  She stated she will let us know at a later date

## 2023-01-30 ENCOUNTER — DOCUMENTATION ONLY (OUTPATIENT)
Dept: ADMINISTRATIVE | Facility: HOSPITAL | Age: 78
End: 2023-01-30
Payer: MEDICARE

## 2023-03-08 ENCOUNTER — OFFICE VISIT (OUTPATIENT)
Dept: RHEUMATOLOGY | Facility: CLINIC | Age: 78
End: 2023-03-08
Payer: MEDICARE

## 2023-03-08 VITALS
DIASTOLIC BLOOD PRESSURE: 76 MMHG | HEIGHT: 63 IN | BODY MASS INDEX: 31.86 KG/M2 | OXYGEN SATURATION: 99 % | WEIGHT: 179.81 LBS | HEART RATE: 87 BPM | TEMPERATURE: 99 F | SYSTOLIC BLOOD PRESSURE: 136 MMHG

## 2023-03-08 DIAGNOSIS — I73.9 PAD (PERIPHERAL ARTERY DISEASE): ICD-10-CM

## 2023-03-08 DIAGNOSIS — M79.7 FIBROMYALGIA SYNDROME: ICD-10-CM

## 2023-03-08 DIAGNOSIS — D50.8 OTHER IRON DEFICIENCY ANEMIA: ICD-10-CM

## 2023-03-08 DIAGNOSIS — K21.9 GASTROESOPHAGEAL REFLUX DISEASE, UNSPECIFIED WHETHER ESOPHAGITIS PRESENT: ICD-10-CM

## 2023-03-08 DIAGNOSIS — M32.9 SYSTEMIC LUPUS ERYTHEMATOSUS, UNSPECIFIED SLE TYPE, UNSPECIFIED ORGAN INVOLVEMENT STATUS: Primary | ICD-10-CM

## 2023-03-08 DIAGNOSIS — M81.0 AGE-RELATED OSTEOPOROSIS WITHOUT CURRENT PATHOLOGICAL FRACTURE: ICD-10-CM

## 2023-03-08 DIAGNOSIS — M76.32 ILIOTIBIAL BAND SYNDROME AFFECTING LEFT LOWER LEG: ICD-10-CM

## 2023-03-08 DIAGNOSIS — E53.8 VITAMIN B12 DEFICIENCY: ICD-10-CM

## 2023-03-08 PROCEDURE — 1101F PR PT FALLS ASSESS DOC 0-1 FALLS W/OUT INJ PAST YR: ICD-10-PCS | Mod: CPTII,S$GLB,, | Performed by: INTERNAL MEDICINE

## 2023-03-08 PROCEDURE — 1125F PR PAIN SEVERITY QUANTIFIED, PAIN PRESENT: ICD-10-PCS | Mod: CPTII,S$GLB,, | Performed by: INTERNAL MEDICINE

## 2023-03-08 PROCEDURE — 99214 OFFICE O/P EST MOD 30 MIN: CPT | Mod: S$GLB,,, | Performed by: INTERNAL MEDICINE

## 2023-03-08 PROCEDURE — 3075F SYST BP GE 130 - 139MM HG: CPT | Mod: CPTII,S$GLB,, | Performed by: INTERNAL MEDICINE

## 2023-03-08 PROCEDURE — 99214 PR OFFICE/OUTPT VISIT, EST, LEVL IV, 30-39 MIN: ICD-10-PCS | Mod: S$GLB,,, | Performed by: INTERNAL MEDICINE

## 2023-03-08 PROCEDURE — 3078F DIAST BP <80 MM HG: CPT | Mod: CPTII,S$GLB,, | Performed by: INTERNAL MEDICINE

## 2023-03-08 PROCEDURE — 1159F PR MEDICATION LIST DOCUMENTED IN MEDICAL RECORD: ICD-10-PCS | Mod: CPTII,S$GLB,, | Performed by: INTERNAL MEDICINE

## 2023-03-08 PROCEDURE — 3288F FALL RISK ASSESSMENT DOCD: CPT | Mod: CPTII,S$GLB,, | Performed by: INTERNAL MEDICINE

## 2023-03-08 PROCEDURE — 1101F PT FALLS ASSESS-DOCD LE1/YR: CPT | Mod: CPTII,S$GLB,, | Performed by: INTERNAL MEDICINE

## 2023-03-08 PROCEDURE — 3075F PR MOST RECENT SYSTOLIC BLOOD PRESS GE 130-139MM HG: ICD-10-PCS | Mod: CPTII,S$GLB,, | Performed by: INTERNAL MEDICINE

## 2023-03-08 PROCEDURE — 3288F PR FALLS RISK ASSESSMENT DOCUMENTED: ICD-10-PCS | Mod: CPTII,S$GLB,, | Performed by: INTERNAL MEDICINE

## 2023-03-08 PROCEDURE — 1125F AMNT PAIN NOTED PAIN PRSNT: CPT | Mod: CPTII,S$GLB,, | Performed by: INTERNAL MEDICINE

## 2023-03-08 PROCEDURE — 1159F MED LIST DOCD IN RCRD: CPT | Mod: CPTII,S$GLB,, | Performed by: INTERNAL MEDICINE

## 2023-03-08 PROCEDURE — 99999 PR PBB SHADOW E&M-EST. PATIENT-LVL IV: ICD-10-PCS | Mod: PBBFAC,,, | Performed by: INTERNAL MEDICINE

## 2023-03-08 PROCEDURE — 99999 PR PBB SHADOW E&M-EST. PATIENT-LVL IV: CPT | Mod: PBBFAC,,, | Performed by: INTERNAL MEDICINE

## 2023-03-08 PROCEDURE — 3078F PR MOST RECENT DIASTOLIC BLOOD PRESSURE < 80 MM HG: ICD-10-PCS | Mod: CPTII,S$GLB,, | Performed by: INTERNAL MEDICINE

## 2023-03-08 RX ORDER — HYDROXYCHLOROQUINE SULFATE 200 MG/1
TABLET, FILM COATED ORAL
Qty: 180 TABLET | Refills: 3 | Status: SHIPPED | OUTPATIENT
Start: 2023-03-08 | End: 2023-07-11 | Stop reason: SDUPTHER

## 2023-03-08 RX ORDER — DICLOFENAC SODIUM 10 MG/G
2 GEL TOPICAL 4 TIMES DAILY
Qty: 100 G | Refills: 5 | Status: SHIPPED | OUTPATIENT
Start: 2023-03-08 | End: 2023-08-28

## 2023-03-08 RX ORDER — TIZANIDINE 4 MG/1
4 TABLET ORAL NIGHTLY
Qty: 90 TABLET | Refills: 3 | Status: SHIPPED | OUTPATIENT
Start: 2023-03-08 | End: 2023-07-11 | Stop reason: SDUPTHER

## 2023-03-08 RX ORDER — FERROUS SULFATE 300 MG/5ML
300 LIQUID (ML) ORAL DAILY
Qty: 150 ML | Refills: 5 | Status: SHIPPED | OUTPATIENT
Start: 2023-03-08 | End: 2023-03-10

## 2023-03-08 RX ORDER — OMEPRAZOLE 40 MG/1
40 CAPSULE, DELAYED RELEASE ORAL EVERY MORNING
Qty: 90 CAPSULE | Refills: 3 | Status: SHIPPED | OUTPATIENT
Start: 2023-03-08 | End: 2023-07-11 | Stop reason: SDUPTHER

## 2023-03-08 NOTE — PROGRESS NOTES
"Subjective:       Patient ID: Edyta Rubio is a 77 y.o. female.    Chief Complaint: Follow-up (Follow up. Patient states that her left leg is hurting and swells at times. Have not been able to take all her meds everyday due to feeling sick after taking them. Pain 8/10)    The patient is complaining of joint pain involving the MCP PIP wrist elbow shoulders hips knees and ankles bilaterally.  The pain is 3/10 in intensity dull in quality and continuous.  That is associated with a morning stiffness lasting for more than 60 minutes.  Is also having difficulty maintaining a good night of sleep.  This has been associated with myalgias.  Muscle aches are 4/10 in intensity dull in quality and continuous.  They are associated with fatigue.  No fever no chills no others.  Labs checked during this visit       Review of Systems   Constitutional:  Negative for appetite change, chills and fever.   HENT:  Negative for congestion, ear pain, mouth sores, nosebleeds and trouble swallowing.    Eyes:  Negative for photophobia and discharge.   Respiratory:  Negative for chest tightness and shortness of breath.    Cardiovascular:  Negative for chest pain.   Gastrointestinal:  Negative for abdominal pain and vomiting.   Endocrine: Negative.    Genitourinary:  Negative for hematuria.   Musculoskeletal:         As per HPI   Skin:  Negative for rash.   Neurological:  Negative for weakness.       Objective:   /76 (BP Location: Right arm, Patient Position: Sitting, BP Method: Large (Automatic))   Pulse 87   Temp 98.6 °F (37 °C) (Oral)   Ht 5' 3" (1.6 m)   Wt 81.6 kg (179 lb 12.8 oz)   SpO2 99%   BMI 31.85 kg/m²      Physical Exam   Constitutional: She is oriented to person, place, and time. She appears well-developed and well-nourished. No distress.   HENT:   Head: Normocephalic and atraumatic.   Right Ear: External ear normal.   Left Ear: External ear normal.   Eyes: Pupils are equal, round, and reactive to light. "   Cardiovascular: Normal rate, regular rhythm and normal heart sounds.   Pulmonary/Chest: Breath sounds normal.   Abdominal: Soft. There is no abdominal tenderness.   Musculoskeletal:      Right shoulder: Tenderness present.      Left shoulder: Tenderness present.      Right elbow: Tenderness present.      Left elbow: Tenderness present.      Right wrist: Tenderness present.      Left wrist: Tenderness present.      Cervical back: Neck supple.      Right hip: Tenderness present.      Left hip: Tenderness present.      Right knee: Tenderness present.      Left knee: Tenderness present.      Right ankle: Tenderness present.      Left ankle: Tenderness present.   Lymphadenopathy:     She has no cervical adenopathy.   Neurological: She is alert and oriented to person, place, and time. She displays normal reflexes. No cranial nerve deficit or sensory deficit. She exhibits normal muscle tone. Coordination normal.   Skin: No rash noted. No erythema.   Vitals reviewed.      Right Side Rheumatological Exam     The patient is tender to palpation of the shoulder, elbow, wrist, knee, 1st PIP, 1st MCP, 2nd PIP, 2nd MCP, 3rd PIP, 3rd MCP, 4th PIP, 4th MCP, 5th PIP, hip, ankle, 1st MTP, 2nd MTP, 3rd MTP, 4th MTP, 5th MTP, 1st toe IP, 2nd toe IP, 3rd toe IP, 4th toe IP and 5th toe IP    Left Side Rheumatological Exam     The patient is tender to palpation of the shoulder, elbow, wrist, knee, 1st PIP, 1st MCP, 2nd PIP, 2nd MCP, 3rd PIP, 3rd MCP, 4th PIP, 4th MCP, 5th PIP, 5th MCP, hip, ankle, 1st MTP, 2nd MTP, 3rd MTP, 4th MTP, 5th MTP, 1st toe IP, 2nd toe IP, 3rd toe IP, 4th toe IP and 5th toe IP.       Completed Fibromyalgia exam 11/18 tender points.  No data to display     Assessment:       1. Systemic lupus erythematosus, unspecified SLE type, unspecified organ involvement status    2. Fibromyalgia syndrome    3. Age-related osteoporosis without current pathological fracture    4. Gastroesophageal reflux disease, unspecified  whether esophagitis present    5. Other iron deficiency anemia    6. PAD (peripheral artery disease)    7. Vitamin B12 deficiency    8. Iliotibial band syndrome affecting left lower leg            Medication List with Changes/Refills   New Medications    FERROUS SULFATE 300 MG (60 MG IRON)/5 ML SYRUP    Take 5 mLs (300 mg total) by mouth once daily.       Start Date: 3/8/2023  End Date: --    FOLIC ACID-VIT B6-VIT B12 2.5-25-2 MG (FOLBIC OR EQUIV) 2.5-25-2 MG TAB    Take 1 tablet by mouth once daily. After breakfast       Start Date: 3/8/2023  End Date: --    OMEPRAZOLE (PRILOSEC) 40 MG CAPSULE    Take 1 capsule (40 mg total) by mouth every morning.       Start Date: 3/8/2023  End Date: 3/7/2024   Current Medications    ACCU-CHEK SOFTCLIX LANCETS MISC    TEST BLOOD SUGAR THREE TIMES DAILY       Start Date: 1/11/2023 End Date: --    ALCOHOL SWABS (BD ALCOHOL SWABS) PADM    Apply 1 each topically 3 (three) times daily.       Start Date: 11/15/2022End Date: --    ASPIRIN 81 MG CHEW    Take 81 mg by mouth once daily.       Start Date: --        End Date: --    ATORVASTATIN (LIPITOR) 40 MG TABLET    TAKE 1 TABLET AT BEDTIME       Start Date: 7/18/2022 End Date: --    BLOOD SUGAR DIAGNOSTIC (ACCU-CHEK GUIDE TEST STRIPS) STRP    1 each by Misc.(Non-Drug; Combo Route) route 3 (three) times daily.       Start Date: 11/17/2022End Date: --    BLOOD-GLUCOSE METER (ACCU-CHEK HANSEL PLUS METER) MISC    1 each by Misc.(Non-Drug; Combo Route) route once daily.       Start Date: 11/15/2022End Date: --    DULAGLUTIDE (TRULICITY) 0.75 MG/0.5 ML PEN INJECTOR    Inject 0.75 mg into the skin every 7 days.       Start Date: 10/4/2022 End Date: 10/4/2023    HYDROCHLOROTHIAZIDE (MICROZIDE) 12.5 MG CAPSULE    TAKE 1 CAPSULE BY MOUTH EVERY DAY       Start Date: 11/14/2022End Date: --    KLOR-CON 8 8 MEQ TBSR    TAKE 1 TABLET BY MOUTH EVERY DAY       Start Date: 5/10/2022 End Date: --    LOSARTAN (COZAAR) 50 MG TABLET    TAKE 1 TABLET EVERY DAY        Start Date: 8/4/2022  End Date: --    METFORMIN (GLUCOPHAGE) 500 MG TABLET    TAKE 2 TABLETS BY MOUTH TWICE A DAY WITH FOOD       Start Date: 2/16/2023 End Date: --    PIOGLITAZONE (ACTOS) 15 MG TABLET    TAKE 1 TABLET BY MOUTH EVERY DAY       Start Date: 11/14/2022End Date: --    RESTASIS 0.05 % OPHTHALMIC EMULSION    Place 1 drop into both eyes 2 (two) times daily.       Start Date: 4/19/2022 End Date: --   Changed and/or Refilled Medications    Modified Medication Previous Medication    DICLOFENAC SODIUM (VOLTAREN) 1 % GEL diclofenac sodium (VOLTAREN) 1 % Gel       Apply 2 g topically 4 (four) times daily.    Apply 2 g topically 4 (four) times daily.       Start Date: 3/8/2023  End Date: --    Start Date: 11/8/2022 End Date: 3/8/2023    HYDROXYCHLOROQUINE (PLAQUENIL) 200 MG TABLET hydrOXYchloroQUINE (PLAQUENIL) 200 mg tablet       TAKE 1 TABLET BY MOUTH TWICE A DAY WITH FOOD OR MILK    TAKE 1 TABLET BY MOUTH TWICE A DAY WITH FOOD OR MILK       Start Date: 3/8/2023  End Date: --    Start Date: 11/8/2022 End Date: 3/8/2023    TIZANIDINE (ZANAFLEX) 4 MG TABLET tiZANidine (ZANAFLEX) 2 MG tablet       Take 1 tablet (4 mg total) by mouth nightly.    Take 1 tablet (2 mg total) by mouth nightly.       Start Date: 3/8/2023  End Date: --    Start Date: 11/8/2022 End Date: 3/8/2023   Discontinued Medications    FERROUS SULFATE 325 (65 FE) MG EC TABLET    Take 1 tablet (325 mg total) by mouth once a week.       Start Date: 11/8/2022 End Date: 3/8/2023    IBANDRONATE (BONIVA) 150 MG TABLET           Start Date: 3/16/2022 End Date: 3/8/2023         Plan:         Problem List Items Addressed This Visit          Cardiac/Vascular    PAD (peripheral artery disease)    Relevant Medications    tiZANidine (ZANAFLEX) 4 MG tablet    hydrOXYchloroQUINE (PLAQUENIL) 200 mg tablet    ferrous sulfate 300 mg (60 mg iron)/5 mL syrup    folic acid-vit B6-vit B12 2.5-25-2 mg (FOLBIC OR EQUIV) 2.5-25-2 mg Tab    diclofenac sodium  (VOLTAREN) 1 % Gel    omeprazole (PRILOSEC) 40 MG capsule       Immunology/Multi System    SLE (systemic lupus erythematosus) - Primary    Relevant Medications    tiZANidine (ZANAFLEX) 4 MG tablet    hydrOXYchloroQUINE (PLAQUENIL) 200 mg tablet    ferrous sulfate 300 mg (60 mg iron)/5 mL syrup    folic acid-vit B6-vit B12 2.5-25-2 mg (FOLBIC OR EQUIV) 2.5-25-2 mg Tab    diclofenac sodium (VOLTAREN) 1 % Gel    omeprazole (PRILOSEC) 40 MG capsule       Oncology    Iron deficiency anemia    Relevant Medications    tiZANidine (ZANAFLEX) 4 MG tablet    hydrOXYchloroQUINE (PLAQUENIL) 200 mg tablet    ferrous sulfate 300 mg (60 mg iron)/5 mL syrup    folic acid-vit B6-vit B12 2.5-25-2 mg (FOLBIC OR EQUIV) 2.5-25-2 mg Tab    diclofenac sodium (VOLTAREN) 1 % Gel    omeprazole (PRILOSEC) 40 MG capsule       Endocrine    Vitamin B12 deficiency    Relevant Medications    tiZANidine (ZANAFLEX) 4 MG tablet    hydrOXYchloroQUINE (PLAQUENIL) 200 mg tablet    ferrous sulfate 300 mg (60 mg iron)/5 mL syrup    folic acid-vit B6-vit B12 2.5-25-2 mg (FOLBIC OR EQUIV) 2.5-25-2 mg Tab    diclofenac sodium (VOLTAREN) 1 % Gel    omeprazole (PRILOSEC) 40 MG capsule       GI    GERD (gastroesophageal reflux disease)    Relevant Medications    tiZANidine (ZANAFLEX) 4 MG tablet    hydrOXYchloroQUINE (PLAQUENIL) 200 mg tablet    ferrous sulfate 300 mg (60 mg iron)/5 mL syrup    folic acid-vit B6-vit B12 2.5-25-2 mg (FOLBIC OR EQUIV) 2.5-25-2 mg Tab    diclofenac sodium (VOLTAREN) 1 % Gel    omeprazole (PRILOSEC) 40 MG capsule       Orthopedic    Fibromyalgia syndrome    Relevant Medications    tiZANidine (ZANAFLEX) 4 MG tablet    hydrOXYchloroQUINE (PLAQUENIL) 200 mg tablet    ferrous sulfate 300 mg (60 mg iron)/5 mL syrup    folic acid-vit B6-vit B12 2.5-25-2 mg (FOLBIC OR EQUIV) 2.5-25-2 mg Tab    diclofenac sodium (VOLTAREN) 1 % Gel    omeprazole (PRILOSEC) 40 MG capsule    Age-related osteoporosis without current pathological fracture     Relevant Medications    tiZANidine (ZANAFLEX) 4 MG tablet    hydrOXYchloroQUINE (PLAQUENIL) 200 mg tablet    ferrous sulfate 300 mg (60 mg iron)/5 mL syrup    folic acid-vit B6-vit B12 2.5-25-2 mg (FOLBIC OR EQUIV) 2.5-25-2 mg Tab    diclofenac sodium (VOLTAREN) 1 % Gel    omeprazole (PRILOSEC) 40 MG capsule    Iliotibial band syndrome affecting left lower leg

## 2023-03-13 ENCOUNTER — TELEPHONE (OUTPATIENT)
Dept: FAMILY MEDICINE | Facility: CLINIC | Age: 78
End: 2023-03-13
Payer: MEDICARE

## 2023-03-13 NOTE — TELEPHONE ENCOUNTER
----- Message from Charity Betts sent at 3/13/2023  2:06 PM CDT -----  Regarding: med advice  .Type:  Needs Medical Advice    Who Called: Pt  Symptoms (please be specific): Nauseated   How long has patient had these symptoms:    Pharmacy name and phone #:    Would the patient rather a call back or a response via [facility]? Call back  Best Call Back Number: [phone]  Additional Information: Pt states she needs something called in for nausea and wants to know when she needs to start her trulicity again..

## 2023-03-13 NOTE — TELEPHONE ENCOUNTER
Please create in person or telemed visit to discuss her nausea and trulicity etc. Over next 2 weeks  Thanks

## 2023-03-20 ENCOUNTER — TELEPHONE (OUTPATIENT)
Dept: RHEUMATOLOGY | Facility: CLINIC | Age: 78
End: 2023-03-20
Payer: MEDICARE

## 2023-03-20 DIAGNOSIS — R11.0 NAUSEA: Primary | ICD-10-CM

## 2023-03-20 RX ORDER — PROMETHAZINE HYDROCHLORIDE 25 MG/1
25 TABLET ORAL EVERY 4 HOURS PRN
Qty: 30 TABLET | Refills: 1 | Status: SHIPPED | OUTPATIENT
Start: 2023-03-20 | End: 2023-08-28

## 2023-03-20 NOTE — TELEPHONE ENCOUNTER
I went over my last note and I do not see a nausea med prescribed. Would you please clarify with the patient which med is this so I can change it? Thanks bh

## 2023-03-20 NOTE — TELEPHONE ENCOUNTER
----- Message from Radha Alfaro sent at 3/17/2023 10:06 AM CDT -----  Regarding: medication  Patient called stating she was prescribed medication for nausea. Cost is 130.00. Is there something else. [phone]

## 2023-04-10 ENCOUNTER — DOCUMENTATION ONLY (OUTPATIENT)
Dept: FAMILY MEDICINE | Facility: CLINIC | Age: 78
End: 2023-04-10
Payer: MEDICARE

## 2023-04-10 DIAGNOSIS — E78.5 HYPERLIPIDEMIA ASSOCIATED WITH TYPE 2 DIABETES MELLITUS: ICD-10-CM

## 2023-04-10 DIAGNOSIS — M81.0 OSTEOPOROSIS, UNSPECIFIED OSTEOPOROSIS TYPE, UNSPECIFIED PATHOLOGICAL FRACTURE PRESENCE: ICD-10-CM

## 2023-04-10 DIAGNOSIS — E11.69 HYPERLIPIDEMIA ASSOCIATED WITH TYPE 2 DIABETES MELLITUS: ICD-10-CM

## 2023-04-10 DIAGNOSIS — E11.59 HYPERTENSION ASSOCIATED WITH TYPE 2 DIABETES MELLITUS: ICD-10-CM

## 2023-04-10 DIAGNOSIS — I15.2 HYPERTENSION ASSOCIATED WITH TYPE 2 DIABETES MELLITUS: ICD-10-CM

## 2023-04-10 DIAGNOSIS — E11.65 TYPE 2 DIABETES MELLITUS WITH HYPERGLYCEMIA, WITHOUT LONG-TERM CURRENT USE OF INSULIN: Primary | ICD-10-CM

## 2023-04-12 LAB
ALBUMIN SERPL-MCNC: 4.2 G/DL (ref 3.7–4.7)
ALBUMIN/CREAT UR: 9 MG/G CREAT (ref 0–29)
ALBUMIN/GLOB SERPL: 1.5 {RATIO} (ref 1.2–2.2)
ALP SERPL-CCNC: 83 IU/L (ref 44–121)
ALT SERPL-CCNC: 8 IU/L (ref 0–32)
AST SERPL-CCNC: 12 IU/L (ref 0–40)
BASOPHILS # BLD AUTO: 0 X10E3/UL (ref 0–0.2)
BASOPHILS NFR BLD AUTO: 1 %
BILIRUB SERPL-MCNC: 0.2 MG/DL (ref 0–1.2)
BUN SERPL-MCNC: 17 MG/DL (ref 8–27)
BUN/CREAT SERPL: 19 (ref 12–28)
CALCIUM SERPL-MCNC: 9.9 MG/DL (ref 8.7–10.3)
CHLORIDE SERPL-SCNC: 101 MMOL/L (ref 96–106)
CHOLEST SERPL-MCNC: 143 MG/DL (ref 100–199)
CO2 SERPL-SCNC: 23 MMOL/L (ref 20–29)
CREAT SERPL-MCNC: 0.9 MG/DL (ref 0.57–1)
CREAT UR-MCNC: 149 MG/DL
EOSINOPHIL # BLD AUTO: 0.1 X10E3/UL (ref 0–0.4)
EOSINOPHIL NFR BLD AUTO: 2 %
ERYTHROCYTE [DISTWIDTH] IN BLOOD BY AUTOMATED COUNT: 12.6 % (ref 11.7–15.4)
EST. GFR  (NO RACE VARIABLE): 67 ML/MIN/1.73
GLOBULIN SER CALC-MCNC: 2.8 G/DL (ref 1.5–4.5)
GLUCOSE SERPL-MCNC: 113 MG/DL (ref 70–99)
HBA1C MFR BLD: 6.3 % (ref 4.8–5.6)
HCT VFR BLD AUTO: 30.8 % (ref 34–46.6)
HDLC SERPL-MCNC: 90 MG/DL
HGB BLD-MCNC: 10 G/DL (ref 11.1–15.9)
IMM GRANULOCYTES NFR BLD AUTO: 0 %
LDLC SERPL CALC-MCNC: 44 MG/DL (ref 0–99)
LYMPHOCYTES # BLD AUTO: 2 X10E3/UL (ref 0.7–3.1)
LYMPHOCYTES NFR BLD AUTO: 33 %
MCH RBC QN AUTO: 30.6 PG (ref 26.6–33)
MCHC RBC AUTO-ENTMCNC: 32.5 G/DL (ref 31.5–35.7)
MCV RBC AUTO: 94 FL (ref 79–97)
MICROALBUMIN UR-MCNC: 14 UG/ML
MONOCYTES # BLD AUTO: 0.5 X10E3/UL (ref 0.1–0.9)
MONOCYTES NFR BLD AUTO: 8 %
NEUTROPHILS # BLD AUTO: 3.4 X10E3/UL (ref 1.4–7)
NEUTROPHILS NFR BLD AUTO: 56 %
PLATELET # BLD AUTO: 213 X10E3/UL (ref 150–450)
POTASSIUM SERPL-SCNC: 4.6 MMOL/L (ref 3.5–5.2)
PROT SERPL-MCNC: 7 G/DL (ref 6–8.5)
RBC # BLD AUTO: 3.27 X10E6/UL (ref 3.77–5.28)
SODIUM SERPL-SCNC: 140 MMOL/L (ref 134–144)
SPECIMEN STATUS REPORT: NORMAL
TRIGL SERPL-MCNC: 35 MG/DL (ref 0–149)
VLDLC SERPL CALC-MCNC: 9 MG/DL (ref 5–40)
WBC # BLD AUTO: 6.1 X10E3/UL (ref 3.4–10.8)

## 2023-04-28 ENCOUNTER — HOSPITAL ENCOUNTER (OUTPATIENT)
Dept: RADIOLOGY | Facility: HOSPITAL | Age: 78
Discharge: HOME | End: 2023-04-28
Attending: INTERNAL MEDICINE
Payer: MEDICARE

## 2023-04-28 DIAGNOSIS — Z12.31 ENCOUNTER FOR SCREENING MAMMOGRAM FOR BREAST CANCER: ICD-10-CM

## 2023-04-28 PROCEDURE — 77067 SCR MAMMO BI INCL CAD: CPT | Mod: 26,,, | Performed by: RADIOLOGY

## 2023-04-28 PROCEDURE — 77063 BREAST TOMOSYNTHESIS BI: CPT | Mod: 26,,, | Performed by: RADIOLOGY

## 2023-04-28 PROCEDURE — 77067 MAMMO DIGITAL SCREENING BILAT WITH TOMO: ICD-10-PCS | Mod: 26,,, | Performed by: RADIOLOGY

## 2023-04-28 PROCEDURE — 77063 MAMMO DIGITAL SCREENING BILAT WITH TOMO: ICD-10-PCS | Mod: 26,,, | Performed by: RADIOLOGY

## 2023-04-28 PROCEDURE — 77067 SCR MAMMO BI INCL CAD: CPT | Mod: TC

## 2023-05-01 ENCOUNTER — OFFICE VISIT (OUTPATIENT)
Dept: FAMILY MEDICINE | Facility: CLINIC | Age: 78
End: 2023-05-01
Payer: MEDICARE

## 2023-05-01 VITALS
HEART RATE: 66 BPM | BODY MASS INDEX: 33.28 KG/M2 | HEIGHT: 63 IN | SYSTOLIC BLOOD PRESSURE: 158 MMHG | WEIGHT: 187.81 LBS | DIASTOLIC BLOOD PRESSURE: 78 MMHG | TEMPERATURE: 98 F | OXYGEN SATURATION: 99 % | RESPIRATION RATE: 18 BRPM

## 2023-05-01 DIAGNOSIS — E11.65 TYPE 2 DIABETES MELLITUS WITH HYPERGLYCEMIA, WITHOUT LONG-TERM CURRENT USE OF INSULIN: Primary | ICD-10-CM

## 2023-05-01 DIAGNOSIS — R11.2 NAUSEA AND VOMITING, UNSPECIFIED VOMITING TYPE: ICD-10-CM

## 2023-05-01 DIAGNOSIS — D50.8 OTHER IRON DEFICIENCY ANEMIA: ICD-10-CM

## 2023-05-01 DIAGNOSIS — N95.9 UNSPECIFIED MENOPAUSAL AND PERIMENOPAUSAL DISORDER: ICD-10-CM

## 2023-05-01 PROCEDURE — 1160F PR REVIEW ALL MEDS BY PRESCRIBER/CLIN PHARMACIST DOCUMENTED: ICD-10-PCS | Mod: CPTII,,, | Performed by: INTERNAL MEDICINE

## 2023-05-01 PROCEDURE — 3044F PR MOST RECENT HEMOGLOBIN A1C LEVEL <7.0%: ICD-10-PCS | Mod: CPTII,,, | Performed by: INTERNAL MEDICINE

## 2023-05-01 PROCEDURE — 1160F RVW MEDS BY RX/DR IN RCRD: CPT | Mod: CPTII,,, | Performed by: INTERNAL MEDICINE

## 2023-05-01 PROCEDURE — 1101F PT FALLS ASSESS-DOCD LE1/YR: CPT | Mod: CPTII,,, | Performed by: INTERNAL MEDICINE

## 2023-05-01 PROCEDURE — 99214 OFFICE O/P EST MOD 30 MIN: CPT | Mod: ,,, | Performed by: INTERNAL MEDICINE

## 2023-05-01 PROCEDURE — 3288F PR FALLS RISK ASSESSMENT DOCUMENTED: ICD-10-PCS | Mod: CPTII,,, | Performed by: INTERNAL MEDICINE

## 2023-05-01 PROCEDURE — 3044F HG A1C LEVEL LT 7.0%: CPT | Mod: CPTII,,, | Performed by: INTERNAL MEDICINE

## 2023-05-01 PROCEDURE — 3077F PR MOST RECENT SYSTOLIC BLOOD PRESSURE >= 140 MM HG: ICD-10-PCS | Mod: CPTII,,, | Performed by: INTERNAL MEDICINE

## 2023-05-01 PROCEDURE — 1159F PR MEDICATION LIST DOCUMENTED IN MEDICAL RECORD: ICD-10-PCS | Mod: CPTII,,, | Performed by: INTERNAL MEDICINE

## 2023-05-01 PROCEDURE — 99214 PR OFFICE/OUTPT VISIT, EST, LEVL IV, 30-39 MIN: ICD-10-PCS | Mod: ,,, | Performed by: INTERNAL MEDICINE

## 2023-05-01 PROCEDURE — 1159F MED LIST DOCD IN RCRD: CPT | Mod: CPTII,,, | Performed by: INTERNAL MEDICINE

## 2023-05-01 PROCEDURE — 3078F PR MOST RECENT DIASTOLIC BLOOD PRESSURE < 80 MM HG: ICD-10-PCS | Mod: CPTII,,, | Performed by: INTERNAL MEDICINE

## 2023-05-01 PROCEDURE — 3288F FALL RISK ASSESSMENT DOCD: CPT | Mod: CPTII,,, | Performed by: INTERNAL MEDICINE

## 2023-05-01 PROCEDURE — 3077F SYST BP >= 140 MM HG: CPT | Mod: CPTII,,, | Performed by: INTERNAL MEDICINE

## 2023-05-01 PROCEDURE — 1101F PR PT FALLS ASSESS DOC 0-1 FALLS W/OUT INJ PAST YR: ICD-10-PCS | Mod: CPTII,,, | Performed by: INTERNAL MEDICINE

## 2023-05-01 PROCEDURE — 3078F DIAST BP <80 MM HG: CPT | Mod: CPTII,,, | Performed by: INTERNAL MEDICINE

## 2023-05-01 RX ORDER — LOSARTAN POTASSIUM 50 MG/1
50 TABLET ORAL 2 TIMES DAILY
Qty: 180 TABLET | Refills: 3 | Status: SHIPPED | OUTPATIENT
Start: 2023-05-01 | End: 2023-05-25

## 2023-05-01 RX ORDER — FERROUS SULFATE 220 (44)/5
220 SOLUTION, ORAL ORAL DAILY
COMMUNITY
End: 2024-01-05

## 2023-05-01 NOTE — PROGRESS NOTES
Subjective:      Patient ID: Edyta Rubio is a 77 y.o. female.    Chief Complaint: Follow-up, Gastroesophageal Reflux, Hyperlipidemia, and Diabetes    HPI  Last wellness 11/1/2022    Did you schedule your mammogram? Yes,result pending  Did you see hematologist for persistent anemia? No, she never got another referral  Did nausea/vomiting stop with trulicity stop? No it persistent, instead she continued to vomit daily and stopped her actos as well, she is also upset that she has gained weight back after stopping trulicity  Did you see DR. Hummel for eye exam, no but agreeable to eye exam in office, unfortunately computers are down and unable to complete, will have to reschedule  Dexa due 2023: ordered       SLE, Fibromyalgia:  Dr. Owens with rheumatology following  Patient is on plaquenil, zanaflex  Says she still has pain in to joints       ANEMIA worse; s/p EGD and colonoscopy, hematology Dr. Maru Jenkins following; DAMIAN and B12 deficiency. on supplement for both Says that she felt sick from iron supplement and was not taking daily; H/H  improved with liquid iron- says she wants to see new hematologist    MRI lumbar spine 3/21 with OA and disc bulging lumbar/sacral area  Patient with persistent lower back and thigh pain  never saw any specialists to f/u her MRI lumbar spine and pain issues  She says she knows we referred her to Dr. Ferguson to evaluate and she say she wasn't ready to go through with this  She also declines PT and neurosurgery evaluation      PAD:  reports chronic lower extremity pain in the thighs  she quit smoking in 1987  has been on statin + aspirin  reports pain is daily    Type 2 DM:  A1c stable at 5.7  eye exam: 2/21 Dr. Hummel, negative for DR  *reminded patient to reschedule with Dr. Hummel for her 2022 eye exam  *needs in office DM eye exam   foot exam: completed with no DN 3/2/2021, due next visit   current medications:  Current meds: Metformin and Actos   on statin  on  "ARB    HTN:  Elevated, + HA today   current med: HCTZ 12.5 mg daily; losartan 50 mg daily    HLD: on statin therapy; no myalgias    GERD: on PPI; sx controlled    Osteoporosis: on calcium/vitamin D, Last DEXA 2021; pending to start Boniva            colonoscopy: 2014 with 1 polyp; 6/2019 with polyps Dr. Moore  Mammogram 4/2023, done, results pending  no flu shot, declines  stress done a few years ago; does not see a cardiologist  prevnar 1/23/19  pneumovax 8/31/2020  shingrix vaccine: due- will work on at next visit with Rx  DEXA: 4/2021 Osteoporosis: advised calcium, vitaminD and to start Boniva once dental work completed  *ordered 5/2023   COVID 19 vaccine: Moderna x 3 doses  ADL: complete and intact  eyes: glasses, due for vision exam, appt scheduled  Advance Directives: not yet completed  HCPOA: spouse  Hearing: passes whisper test at 5 feet      Health Maintenance Due   Topic Date Due    Hepatitis C Screening  Never done    Foot Exam  Never done    Shingles Vaccine (1 of 2) Never done    Abdominal Aortic Aneurysm Screening  Never done    Eye Exam  11/12/2022    Mammogram  04/26/2023     Review of Systems   Gastrointestinal:  Positive for nausea and vomiting.   Musculoskeletal:  Positive for back pain.   Neurological:  Positive for headaches.     Objective:     Vitals:    05/01/23 0852 05/01/23 0854   BP: (!) 182/74 (!) 158/78   BP Location: Right arm Left arm   Patient Position: Sitting Sitting   BP Method: Large (Automatic) Large (Automatic)   Pulse: 66    Resp: 18    Temp: 97.9 °F (36.6 °C)    TempSrc: Temporal    SpO2: 99%    Weight: 85.2 kg (187 lb 12.8 oz)    Height: 5' 3" (1.6 m)      Physical Exam  Vitals and nursing note reviewed.   Constitutional:       General: She is not in acute distress.     Appearance: Normal appearance. She is not ill-appearing.   HENT:      Head: Normocephalic and atraumatic.   Musculoskeletal:      Right lower leg: No edema.      Left lower leg: No edema.   Neurological:      " Mental Status: She is alert.   Psychiatric:         Behavior: Behavior normal.     Assessment/Plan:     1. Type 2 diabetes mellitus with hyperglycemia, without long-term current use of insulin  -     Diabetic Eye Screening Photo; Future  Given her persistent nausea/vomiting, will stop Metformin now as this can cause some GI distress  Restart Actos  I do not want to restart Trulicity given that this can definitely increase her N/V symptoms  I do want GI to evaluate her for this as I am not certain why this is happening, she is not losing weight from it  2. Other iron deficiency anemia  -     Ambulatory referral/consult to Hematology / Oncology; Future; Expected date: 05/08/2023  Iron improved with oral liquid iron  Referral to hematology outside for 2nd opinion per patient request  3. Nausea and vomiting, unspecified vomiting type  -     Ambulatory referral/consult to Gastroenterology; Future; Expected date: 05/08/2023    4. Unspecified menopausal and perimenopausal disorder  -     DXA Bone Density Axial Skeleton 1 or more sites; Future; Expected date: 05/01/2023    Other orders  -     losartan (COZAAR) 50 MG tablet; Take 1 tablet (50 mg total) by mouth 2 (two) times a day.  Dispense: 180 tablet; Refill: 3    BP uncontrolled increase losartan to 50 mg po BID  F/U in a few weeks to re-check       Follow up in about 24 days (around 5/25/2023) for Follow Up - Chronic Conditions add on for 11:45 .    This includes face to face time and non-face to face time preparing to see the patient (eg, review of tests), obtaining and/or reviewing separately obtained history, documenting clinical information in the electronic or other health record, independently interpreting results and communicating results to the patient/family/caregiver, or care coordinator.

## 2023-05-12 ENCOUNTER — HOSPITAL ENCOUNTER (OUTPATIENT)
Dept: RADIOLOGY | Facility: HOSPITAL | Age: 78
Discharge: HOME | End: 2023-05-12
Attending: INTERNAL MEDICINE
Payer: MEDICARE

## 2023-05-12 DIAGNOSIS — N95.9 UNSPECIFIED MENOPAUSAL AND PERIMENOPAUSAL DISORDER: ICD-10-CM

## 2023-05-12 PROCEDURE — 77080 DXA BONE DENSITY AXIAL SKELETON 1 OR MORE SITES: ICD-10-PCS | Mod: 26,,, | Performed by: RADIOLOGY

## 2023-05-12 PROCEDURE — 77080 DXA BONE DENSITY AXIAL: CPT | Mod: 26,,, | Performed by: RADIOLOGY

## 2023-05-12 PROCEDURE — 77080 DXA BONE DENSITY AXIAL: CPT | Mod: TC

## 2023-05-17 DIAGNOSIS — E11.65 UNCONTROLLED TYPE 2 DIABETES MELLITUS WITH HYPERGLYCEMIA: ICD-10-CM

## 2023-05-17 RX ORDER — PIOGLITAZONEHYDROCHLORIDE 15 MG/1
TABLET ORAL
Qty: 90 TABLET | Refills: 1 | Status: SHIPPED | OUTPATIENT
Start: 2023-05-17 | End: 2023-05-25

## 2023-05-25 ENCOUNTER — OFFICE VISIT (OUTPATIENT)
Dept: FAMILY MEDICINE | Facility: CLINIC | Age: 78
End: 2023-05-25
Payer: MEDICARE

## 2023-05-25 VITALS
HEART RATE: 68 BPM | BODY MASS INDEX: 33.95 KG/M2 | DIASTOLIC BLOOD PRESSURE: 86 MMHG | HEIGHT: 63 IN | RESPIRATION RATE: 18 BRPM | TEMPERATURE: 98 F | WEIGHT: 191.63 LBS | OXYGEN SATURATION: 99 % | SYSTOLIC BLOOD PRESSURE: 142 MMHG

## 2023-05-25 DIAGNOSIS — R51.9 NONINTRACTABLE HEADACHE, UNSPECIFIED CHRONICITY PATTERN, UNSPECIFIED HEADACHE TYPE: ICD-10-CM

## 2023-05-25 DIAGNOSIS — E11.59 HYPERTENSION ASSOCIATED WITH TYPE 2 DIABETES MELLITUS: Primary | ICD-10-CM

## 2023-05-25 DIAGNOSIS — I15.2 HYPERTENSION ASSOCIATED WITH TYPE 2 DIABETES MELLITUS: Primary | ICD-10-CM

## 2023-05-25 DIAGNOSIS — E11.65 TYPE 2 DIABETES MELLITUS WITH HYPERGLYCEMIA, WITHOUT LONG-TERM CURRENT USE OF INSULIN: ICD-10-CM

## 2023-05-25 PROCEDURE — 3288F PR FALLS RISK ASSESSMENT DOCUMENTED: ICD-10-PCS | Mod: CPTII,,, | Performed by: INTERNAL MEDICINE

## 2023-05-25 PROCEDURE — 99214 OFFICE O/P EST MOD 30 MIN: CPT | Mod: ,,, | Performed by: INTERNAL MEDICINE

## 2023-05-25 PROCEDURE — 3044F PR MOST RECENT HEMOGLOBIN A1C LEVEL <7.0%: ICD-10-PCS | Mod: CPTII,,, | Performed by: INTERNAL MEDICINE

## 2023-05-25 PROCEDURE — 1159F MED LIST DOCD IN RCRD: CPT | Mod: CPTII,,, | Performed by: INTERNAL MEDICINE

## 2023-05-25 PROCEDURE — 99214 PR OFFICE/OUTPT VISIT, EST, LEVL IV, 30-39 MIN: ICD-10-PCS | Mod: ,,, | Performed by: INTERNAL MEDICINE

## 2023-05-25 PROCEDURE — 1159F PR MEDICATION LIST DOCUMENTED IN MEDICAL RECORD: ICD-10-PCS | Mod: CPTII,,, | Performed by: INTERNAL MEDICINE

## 2023-05-25 PROCEDURE — 3044F HG A1C LEVEL LT 7.0%: CPT | Mod: CPTII,,, | Performed by: INTERNAL MEDICINE

## 2023-05-25 PROCEDURE — 3288F FALL RISK ASSESSMENT DOCD: CPT | Mod: CPTII,,, | Performed by: INTERNAL MEDICINE

## 2023-05-25 PROCEDURE — 3077F SYST BP >= 140 MM HG: CPT | Mod: CPTII,,, | Performed by: INTERNAL MEDICINE

## 2023-05-25 PROCEDURE — 1101F PT FALLS ASSESS-DOCD LE1/YR: CPT | Mod: CPTII,,, | Performed by: INTERNAL MEDICINE

## 2023-05-25 PROCEDURE — 1160F PR REVIEW ALL MEDS BY PRESCRIBER/CLIN PHARMACIST DOCUMENTED: ICD-10-PCS | Mod: CPTII,,, | Performed by: INTERNAL MEDICINE

## 2023-05-25 PROCEDURE — 1101F PR PT FALLS ASSESS DOC 0-1 FALLS W/OUT INJ PAST YR: ICD-10-PCS | Mod: CPTII,,, | Performed by: INTERNAL MEDICINE

## 2023-05-25 PROCEDURE — 3079F DIAST BP 80-89 MM HG: CPT | Mod: CPTII,,, | Performed by: INTERNAL MEDICINE

## 2023-05-25 PROCEDURE — 3077F PR MOST RECENT SYSTOLIC BLOOD PRESSURE >= 140 MM HG: ICD-10-PCS | Mod: CPTII,,, | Performed by: INTERNAL MEDICINE

## 2023-05-25 PROCEDURE — 1160F RVW MEDS BY RX/DR IN RCRD: CPT | Mod: CPTII,,, | Performed by: INTERNAL MEDICINE

## 2023-05-25 PROCEDURE — 3079F PR MOST RECENT DIASTOLIC BLOOD PRESSURE 80-89 MM HG: ICD-10-PCS | Mod: CPTII,,, | Performed by: INTERNAL MEDICINE

## 2023-05-25 RX ORDER — DOXAZOSIN 2 MG/1
2 TABLET ORAL NIGHTLY
Qty: 30 TABLET | Refills: 11 | Status: SHIPPED | OUTPATIENT
Start: 2023-05-25 | End: 2023-07-14

## 2023-05-25 RX ORDER — DULAGLUTIDE 0.75 MG/.5ML
0.75 INJECTION, SOLUTION SUBCUTANEOUS
Qty: 4 PEN | Refills: 11 | Status: SHIPPED | OUTPATIENT
Start: 2023-05-25 | End: 2024-01-05

## 2023-05-25 RX ORDER — PIOGLITAZONEHYDROCHLORIDE 30 MG/1
30 TABLET ORAL DAILY
Qty: 90 TABLET | Refills: 3 | Status: SHIPPED | OUTPATIENT
Start: 2023-05-25 | End: 2024-03-20

## 2023-05-25 RX ORDER — LOSARTAN POTASSIUM 100 MG/1
100 TABLET ORAL DAILY
Qty: 90 TABLET | Refills: 3 | Status: SHIPPED | OUTPATIENT
Start: 2023-05-25 | End: 2023-08-28

## 2023-05-25 NOTE — PROGRESS NOTES
"Subjective:      Patient ID: Edyta Rubio is a 77 y.o. female.    Chief Complaint: Follow-up, Diabetes, and Headache    HPI  Patient returns today for BP follow up visit. At last OV we increased her losartan to 50 mg po BID, BP improved but not much, she is compliant with treatment. Says she still has HA during morning and nigth time, no focal deficit, no numbness, no vision changes, no tremors, no shaking, no lightheadedness.   For her DM she says since she stopped metformin she is doing better, no more diarrhea/nausea. She wants to retry trulicity again as she is frustrated with weight gain and BG slowly rising 120-140    Health Maintenance Due   Topic Date Due    Hepatitis C Screening  Never done    Foot Exam  Never done    Shingles Vaccine (1 of 2) Never done    Eye Exam  11/12/2022     Review of Systems   Musculoskeletal:  Positive for back pain.   Neurological:  Positive for headaches.     Objective:     Vitals:    05/25/23 1148 05/25/23 1149 05/25/23 1209   BP: (!) 146/82 (!) 150/80 (!) 142/86   BP Location: Left arm Right arm Right arm   Patient Position: Sitting Sitting Sitting   BP Method: Large (Automatic) Large (Automatic) Large (Manual)   Pulse: 68     Resp: 18     Temp: 98.2 °F (36.8 °C)     TempSrc: Temporal     SpO2: 99%     Weight: 86.9 kg (191 lb 9.6 oz)     Height: 5' 3" (1.6 m)       Physical Exam  Vitals and nursing note reviewed.   Constitutional:       General: She is not in acute distress.     Appearance: Normal appearance. She is not ill-appearing.   HENT:      Head: Normocephalic and atraumatic.   Neurological:      Mental Status: She is alert.   Psychiatric:         Behavior: Behavior normal.   Protective Sensation (w/ 10 gram monofilament):  Right: Intact  Left: Intact    Visual Inspection:  Normal -  Bilateral    Pedal Pulses:   Right: Present  Left: Present    Posterior Tibialis Pulses:   Right:Present  Left: Present    Assessment/Plan:     1. Hypertension associated with type 2 " diabetes mellitus  Uncontrolled  Add doxazosin 2 mg po QHS  Continue losartan  Continue HCTZ  Low sodium low fat ADA diet advised  2. Type 2 diabetes mellitus with hyperglycemia, without long-term current use of insulin  Unstable, BG rising  Increase Actos to 30 mg po daily  Retry Trulicity 0.75 mg SubQ Weekly  If your sx return of nausea/vomiting, stop the trulicity  3. Nonintractable headache, unspecified chronicity pattern, unspecified headache type  Patient has no focal neurologic deficit  Work on BP control  If BP improves but HA remains notify me and we will discuss add'l work up  Other orders  -     doxazosin (CARDURA) 2 MG tablet; Take 1 tablet (2 mg total) by mouth every evening.  Dispense: 30 tablet; Refill: 11  -     pioglitazone (ACTOS) 30 MG tablet; Take 1 tablet (30 mg total) by mouth once daily.  Dispense: 90 tablet; Refill: 3       Follow up in about 3 months (around 8/25/2023) for Follow Up - Chronic Conditions.    I spent a total of 25 minutes on the day of the visit.This includes face to face time and non-face to face time preparing to see the patient (eg, review of tests), obtaining and/or reviewing separately obtained history, documenting clinical information in the electronic or other health record, independently interpreting results and communicating results to the patient/family/caregiver, or care coordinator.

## 2023-07-07 ENCOUNTER — TELEPHONE (OUTPATIENT)
Dept: FAMILY MEDICINE | Facility: CLINIC | Age: 78
End: 2023-07-07
Payer: MEDICARE

## 2023-07-07 NOTE — TELEPHONE ENCOUNTER
Pt called stating that for the last 3 weeks her legs have been swelling  She stated they are full of fluid  I did advise pt Dr Rooney was out and she should go to an [facility] or ED to be evaluated  Pt stated she does not like to go there   I advised pt that Dr Rooney has an opening on 07.14 @ 1130   Pt agreed to this appt  I did advise I would send message to Dr Rooney in the mean time for the pt to monitor her legs if they get too swollen or pt has any pain to go to [facility] or ED for evaluation  Pt expressed understanding

## 2023-07-07 NOTE — TELEPHONE ENCOUNTER
----- Message from [facility] sent at 7/6/2023  9:26 AM CDT -----  .Type:  Needs Medical Advice    Who Called:  pt    Symptoms (please be specific): legs and feet swelling     How long has patient had these symptoms:   3 weeks     Pharmacy name and phone #:   [facility] [place]    Would the patient rather a call back? Yes    Best Call Back Number:  [phone]    Additional Information:     losartan (COZAAR) 100 MG tablet  doxazosin (CARDURA) 2 MG tablet pt thinks after taking these medications is when she started swelling

## 2023-07-10 NOTE — TELEPHONE ENCOUNTER
"Spoke with pt  Advised of recommendations  Pt expressed understanding  She stated she "does not like to go to different doctors"  She will be here Friday  " How Severe Is Your Skin Lesion?: mild Has Your Skin Lesion Been Treated?: not been treated Is This A New Presentation, Or A Follow-Up?: Skin Lesion

## 2023-07-10 NOTE — TELEPHONE ENCOUNTER
Needs to be seen, appt is scheduled per my review of nurse notes, if sx are worsening [place] [facility] [place]

## 2023-07-11 ENCOUNTER — OFFICE VISIT (OUTPATIENT)
Dept: RHEUMATOLOGY | Facility: CLINIC | Age: 78
End: 2023-07-11
Payer: MEDICARE

## 2023-07-11 VITALS
SYSTOLIC BLOOD PRESSURE: 135 MMHG | OXYGEN SATURATION: 99 % | TEMPERATURE: 98 F | WEIGHT: 208.19 LBS | DIASTOLIC BLOOD PRESSURE: 85 MMHG | HEART RATE: 82 BPM | BODY MASS INDEX: 36.89 KG/M2 | HEIGHT: 63 IN

## 2023-07-11 DIAGNOSIS — K21.9 GASTROESOPHAGEAL REFLUX DISEASE, UNSPECIFIED WHETHER ESOPHAGITIS PRESENT: ICD-10-CM

## 2023-07-11 DIAGNOSIS — M79.7 FIBROMYALGIA SYNDROME: ICD-10-CM

## 2023-07-11 DIAGNOSIS — E53.8 VITAMIN B12 DEFICIENCY: ICD-10-CM

## 2023-07-11 DIAGNOSIS — M76.32 ILIOTIBIAL BAND SYNDROME AFFECTING LEFT LOWER LEG: ICD-10-CM

## 2023-07-11 DIAGNOSIS — I73.9 PAD (PERIPHERAL ARTERY DISEASE): ICD-10-CM

## 2023-07-11 DIAGNOSIS — M81.0 AGE-RELATED OSTEOPOROSIS WITHOUT CURRENT PATHOLOGICAL FRACTURE: ICD-10-CM

## 2023-07-11 DIAGNOSIS — D50.8 OTHER IRON DEFICIENCY ANEMIA: ICD-10-CM

## 2023-07-11 DIAGNOSIS — M32.9 SYSTEMIC LUPUS ERYTHEMATOSUS, UNSPECIFIED SLE TYPE, UNSPECIFIED ORGAN INVOLVEMENT STATUS: Primary | ICD-10-CM

## 2023-07-11 PROCEDURE — 1125F PR PAIN SEVERITY QUANTIFIED, PAIN PRESENT: ICD-10-PCS | Mod: CPTII,S$GLB,, | Performed by: INTERNAL MEDICINE

## 2023-07-11 PROCEDURE — 1101F PR PT FALLS ASSESS DOC 0-1 FALLS W/OUT INJ PAST YR: ICD-10-PCS | Mod: CPTII,S$GLB,, | Performed by: INTERNAL MEDICINE

## 2023-07-11 PROCEDURE — 1159F MED LIST DOCD IN RCRD: CPT | Mod: CPTII,S$GLB,, | Performed by: INTERNAL MEDICINE

## 2023-07-11 PROCEDURE — 99999 PR PBB SHADOW E&M-EST. PATIENT-LVL IV: ICD-10-PCS | Mod: PBBFAC,,, | Performed by: INTERNAL MEDICINE

## 2023-07-11 PROCEDURE — 1101F PT FALLS ASSESS-DOCD LE1/YR: CPT | Mod: CPTII,S$GLB,, | Performed by: INTERNAL MEDICINE

## 2023-07-11 PROCEDURE — 3079F PR MOST RECENT DIASTOLIC BLOOD PRESSURE 80-89 MM HG: ICD-10-PCS | Mod: CPTII,S$GLB,, | Performed by: INTERNAL MEDICINE

## 2023-07-11 PROCEDURE — 1125F AMNT PAIN NOTED PAIN PRSNT: CPT | Mod: CPTII,S$GLB,, | Performed by: INTERNAL MEDICINE

## 2023-07-11 PROCEDURE — 99214 OFFICE O/P EST MOD 30 MIN: CPT | Mod: S$GLB,,, | Performed by: INTERNAL MEDICINE

## 2023-07-11 PROCEDURE — 99214 PR OFFICE/OUTPT VISIT, EST, LEVL IV, 30-39 MIN: ICD-10-PCS | Mod: S$GLB,,, | Performed by: INTERNAL MEDICINE

## 2023-07-11 PROCEDURE — 3079F DIAST BP 80-89 MM HG: CPT | Mod: CPTII,S$GLB,, | Performed by: INTERNAL MEDICINE

## 2023-07-11 PROCEDURE — 3288F FALL RISK ASSESSMENT DOCD: CPT | Mod: CPTII,S$GLB,, | Performed by: INTERNAL MEDICINE

## 2023-07-11 PROCEDURE — 3288F PR FALLS RISK ASSESSMENT DOCUMENTED: ICD-10-PCS | Mod: CPTII,S$GLB,, | Performed by: INTERNAL MEDICINE

## 2023-07-11 PROCEDURE — 99999 PR PBB SHADOW E&M-EST. PATIENT-LVL IV: CPT | Mod: PBBFAC,,, | Performed by: INTERNAL MEDICINE

## 2023-07-11 PROCEDURE — 3075F PR MOST RECENT SYSTOLIC BLOOD PRESS GE 130-139MM HG: ICD-10-PCS | Mod: CPTII,S$GLB,, | Performed by: INTERNAL MEDICINE

## 2023-07-11 PROCEDURE — 3075F SYST BP GE 130 - 139MM HG: CPT | Mod: CPTII,S$GLB,, | Performed by: INTERNAL MEDICINE

## 2023-07-11 PROCEDURE — 1159F PR MEDICATION LIST DOCUMENTED IN MEDICAL RECORD: ICD-10-PCS | Mod: CPTII,S$GLB,, | Performed by: INTERNAL MEDICINE

## 2023-07-11 RX ORDER — HYDROXYCHLOROQUINE SULFATE 200 MG/1
TABLET, FILM COATED ORAL
Qty: 180 TABLET | Refills: 3 | Status: SHIPPED | OUTPATIENT
Start: 2023-07-11

## 2023-07-11 RX ORDER — TIZANIDINE 4 MG/1
4 TABLET ORAL NIGHTLY
Qty: 90 TABLET | Refills: 3 | Status: SHIPPED | OUTPATIENT
Start: 2023-07-11

## 2023-07-11 RX ORDER — PREGABALIN 25 MG/1
25 CAPSULE ORAL 2 TIMES DAILY
Qty: 60 CAPSULE | Refills: 5 | Status: SHIPPED | OUTPATIENT
Start: 2023-07-11 | End: 2023-08-28

## 2023-07-11 RX ORDER — OMEPRAZOLE 40 MG/1
40 CAPSULE, DELAYED RELEASE ORAL EVERY MORNING
Qty: 90 CAPSULE | Refills: 3 | Status: SHIPPED | OUTPATIENT
Start: 2023-07-11 | End: 2024-07-10

## 2023-07-11 NOTE — PROGRESS NOTES
"Subjective:       Patient ID: Edyta Rubio is a 78 y.o. female.    Chief Complaint: Follow-up (Follow up. Patient states that her legs are swollen and painful. Pain 8/10.back pain since starting to gain weight. )    The patient is complaining of joint pain involving the MCP PIP wrist elbow shoulders hips knees and ankles bilaterally.  The pain is 1/10 in intensity dull in quality and continuous.  That is associated with a morning stiffness lasting for more than 60 minutes.  Is also having difficulty maintaining a good night of sleep.  This has been associated with myalgias.  Muscle aches are 1/10 in intensity dull in quality and continuous.  They are associated with fatigue.  No fever no chills no others.  Pitting edema legs not related to my meds. I added an otc supplement for this named L-ARGININE-LCITRULLINE    Review of Systems   Constitutional:  Negative for appetite change, chills and fever.   HENT:  Negative for congestion, ear pain, mouth sores, nosebleeds and trouble swallowing.    Eyes:  Negative for photophobia and discharge.   Respiratory:  Negative for chest tightness and shortness of breath.    Cardiovascular:  Negative for chest pain.   Gastrointestinal:  Negative for abdominal pain and vomiting.   Endocrine: Negative.    Genitourinary:  Negative for hematuria.   Musculoskeletal:         As per HPI   Skin:  Negative for rash.   Neurological:  Negative for weakness.       Objective:   /85 (BP Location: Right arm, Patient Position: Sitting, BP Method: Large (Automatic))   Pulse 82   Temp 98.2 °F (36.8 °C) (Oral)   Ht 5' 3" (1.6 m)   Wt 94.4 kg (208 lb 3.2 oz)   SpO2 99%   BMI 36.88 kg/m²      Physical Exam   Constitutional: She is oriented to person, place, and time. She appears well-developed and well-nourished. No distress.   HENT:   Head: Normocephalic and atraumatic.   Right Ear: External ear normal.   Left Ear: External ear normal.   Eyes: Pupils are equal, round, and reactive to " light.   Cardiovascular: Normal rate, regular rhythm and normal heart sounds.   Pulmonary/Chest: Breath sounds normal.   Abdominal: Soft. There is no abdominal tenderness.   Musculoskeletal:      Right shoulder: Normal.      Left shoulder: Normal.      Right elbow: Normal.      Left elbow: Normal.      Right wrist: Normal.      Left wrist: Normal.      Cervical back: Neck supple.      Right hip: Normal.      Left hip: Normal.      Right knee: Normal.      Left knee: Normal.   Lymphadenopathy:     She has no cervical adenopathy.   Neurological: She is alert and oriented to person, place, and time. She displays normal reflexes. No cranial nerve deficit or sensory deficit. She exhibits normal muscle tone. Coordination normal.   Skin: No rash noted. No erythema.   Vitals reviewed.      Right Side Rheumatological Exam     Examination finds the shoulder, elbow, wrist, knee, 1st PIP, 1st MCP, 2nd PIP, 2nd MCP, 3rd PIP, 3rd MCP, 4th PIP, 4th MCP, 5th PIP, 5th MCP, temporomandibular, hip, ankle, 1st MTP, 2nd MTP, 3rd MTP, 4th MTP and 5th MTP normal.    Left Side Rheumatological Exam     Examination finds the shoulder, elbow, wrist, knee, 1st PIP, 1st MCP, 2nd PIP, 2nd MCP, 3rd PIP, 3rd MCP, 4th PIP, 4th MCP, 5th PIP, 5th MCP, temporomandibular, hip, ankle, 1st MTP, 2nd MTP, 3rd MTP, 4th MTP and 5th MTP normal.       Completed Fibromyalgia exam 11/18 tender points.  No data to display     Assessment:       1. Systemic lupus erythematosus, unspecified SLE type, unspecified organ involvement status    2. PAD (peripheral artery disease)    3. Other iron deficiency anemia    4. Vitamin B12 deficiency    5. Gastroesophageal reflux disease, unspecified whether esophagitis present    6. Fibromyalgia syndrome    7. Iliotibial band syndrome affecting left lower leg    8. Age-related osteoporosis without current pathological fracture          Medication List with Changes/Refills   New Medications    PREGABALIN (LYRICA) 25 MG CAPSULE     Take 1 capsule (25 mg total) by mouth 2 (two) times daily.       Start Date: 7/11/2023 End Date: 1/9/2024   Current Medications    ALCOHOL SWABS (BD ALCOHOL SWABS) PADM    Apply 1 each topically 3 (three) times daily.       Start Date: 11/15/2022End Date: --    ASPIRIN 81 MG CHEW    Take 81 mg by mouth once daily.       Start Date: --        End Date: --    ATORVASTATIN (LIPITOR) 40 MG TABLET    TAKE 1 TABLET AT BEDTIME       Start Date: 7/18/2022 End Date: --    BLOOD SUGAR DIAGNOSTIC (ACCU-CHEK GUIDE TEST STRIPS) STRP    1 each by Misc.(Non-Drug; Combo Route) route 3 (three) times daily.       Start Date: 11/17/2022End Date: --    BLOOD-GLUCOSE METER (ACCU-CHEK HANSEL PLUS METER) MISC    1 each by Misc.(Non-Drug; Combo Route) route once daily.       Start Date: 11/15/2022End Date: --    DICLOFENAC SODIUM (VOLTAREN) 1 % GEL    Apply 2 g topically 4 (four) times daily.       Start Date: 3/8/2023  End Date: --    DOXAZOSIN (CARDURA) 2 MG TABLET    Take 1 tablet (2 mg total) by mouth every evening.       Start Date: 5/25/2023 End Date: 5/24/2024    DULAGLUTIDE (TRULICITY) 0.75 MG/0.5 ML PEN INJECTOR    Inject 0.75 mg into the skin every 7 days.       Start Date: 5/25/2023 End Date: 5/24/2024    FERROUS SULFATE 220 MG (44 MG IRON)/5 ML SOLUTION    Take 220 mg by mouth once daily.       Start Date: --        End Date: --    HYDROCHLOROTHIAZIDE (MICROZIDE) 12.5 MG CAPSULE    TAKE 1 CAPSULE BY MOUTH EVERY DAY       Start Date: 11/14/2022End Date: --    LANCETS (ACCU-CHEK SOFTCLIX LANCETS) MISC    TEST BLOOD SUGAR THREE TIMES DAILY       Start Date: 3/27/2023 End Date: --    LOSARTAN (COZAAR) 100 MG TABLET    Take 1 tablet (100 mg total) by mouth once daily.       Start Date: 5/25/2023 End Date: 5/24/2024    PIOGLITAZONE (ACTOS) 30 MG TABLET    Take 1 tablet (30 mg total) by mouth once daily.       Start Date: 5/25/2023 End Date: 5/24/2024    PROMETHAZINE (PHENERGAN) 25 MG TABLET    Take 1 tablet (25 mg total) by mouth  every 4 (four) hours as needed for Nausea.       Start Date: 3/20/2023 End Date: --    RESTASIS 0.05 % OPHTHALMIC EMULSION    Place 1 drop into both eyes 2 (two) times daily.       Start Date: 4/19/2022 End Date: --   Changed and/or Refilled Medications    Modified Medication Previous Medication    HYDROXYCHLOROQUINE (PLAQUENIL) 200 MG TABLET hydrOXYchloroQUINE (PLAQUENIL) 200 mg tablet       TAKE 1 TABLET BY MOUTH TWICE A DAY WITH FOOD OR MILK    TAKE 1 TABLET BY MOUTH TWICE A DAY WITH FOOD OR MILK       Start Date: 7/11/2023 End Date: --    Start Date: 3/8/2023  End Date: 7/11/2023    OMEPRAZOLE (PRILOSEC) 40 MG CAPSULE omeprazole (PRILOSEC) 40 MG capsule       Take 1 capsule (40 mg total) by mouth every morning.    Take 1 capsule (40 mg total) by mouth every morning.       Start Date: 7/11/2023 End Date: 7/10/2024    Start Date: 3/8/2023  End Date: 7/11/2023    TIZANIDINE (ZANAFLEX) 4 MG TABLET tiZANidine (ZANAFLEX) 4 MG tablet       Take 1 tablet (4 mg total) by mouth nightly.    Take 1 tablet (4 mg total) by mouth nightly.       Start Date: 7/11/2023 End Date: --    Start Date: 3/8/2023  End Date: 7/11/2023   Discontinued Medications    FOLIC ACID-VIT B6-VIT B12 2.5-25-2 MG (FOLBIC OR EQUIV) 2.5-25-2 MG TAB    Take 1 tablet by mouth once daily. After breakfast       Start Date: 3/8/2023  End Date: 7/11/2023         Plan:         Problem List Items Addressed This Visit          Cardiac/Vascular    PAD (peripheral artery disease)    Relevant Medications    pregabalin (LYRICA) 25 MG capsule    omeprazole (PRILOSEC) 40 MG capsule    tiZANidine (ZANAFLEX) 4 MG tablet    hydrOXYchloroQUINE (PLAQUENIL) 200 mg tablet       Immunology/Multi System    SLE (systemic lupus erythematosus) - Primary    Relevant Medications    pregabalin (LYRICA) 25 MG capsule    omeprazole (PRILOSEC) 40 MG capsule    tiZANidine (ZANAFLEX) 4 MG tablet    hydrOXYchloroQUINE (PLAQUENIL) 200 mg tablet       Oncology    Iron deficiency anemia     Relevant Medications    pregabalin (LYRICA) 25 MG capsule    omeprazole (PRILOSEC) 40 MG capsule    tiZANidine (ZANAFLEX) 4 MG tablet    hydrOXYchloroQUINE (PLAQUENIL) 200 mg tablet       Endocrine    Vitamin B12 deficiency    Relevant Medications    pregabalin (LYRICA) 25 MG capsule    omeprazole (PRILOSEC) 40 MG capsule    tiZANidine (ZANAFLEX) 4 MG tablet    hydrOXYchloroQUINE (PLAQUENIL) 200 mg tablet       GI    GERD (gastroesophageal reflux disease)    Relevant Medications    pregabalin (LYRICA) 25 MG capsule    omeprazole (PRILOSEC) 40 MG capsule    tiZANidine (ZANAFLEX) 4 MG tablet    hydrOXYchloroQUINE (PLAQUENIL) 200 mg tablet       Orthopedic    Fibromyalgia syndrome    Relevant Medications    pregabalin (LYRICA) 25 MG capsule    omeprazole (PRILOSEC) 40 MG capsule    tiZANidine (ZANAFLEX) 4 MG tablet    hydrOXYchloroQUINE (PLAQUENIL) 200 mg tablet    Age-related osteoporosis without current pathological fracture    Relevant Medications    pregabalin (LYRICA) 25 MG capsule    omeprazole (PRILOSEC) 40 MG capsule    tiZANidine (ZANAFLEX) 4 MG tablet    hydrOXYchloroQUINE (PLAQUENIL) 200 mg tablet    Iliotibial band syndrome affecting left lower leg    Relevant Medications    pregabalin (LYRICA) 25 MG capsule    omeprazole (PRILOSEC) 40 MG capsule    tiZANidine (ZANAFLEX) 4 MG tablet    hydrOXYchloroQUINE (PLAQUENIL) 200 mg tablet

## 2023-07-14 ENCOUNTER — OFFICE VISIT (OUTPATIENT)
Dept: FAMILY MEDICINE | Facility: CLINIC | Age: 78
End: 2023-07-14
Payer: MEDICARE

## 2023-07-14 VITALS
HEART RATE: 84 BPM | RESPIRATION RATE: 16 BRPM | HEIGHT: 63 IN | BODY MASS INDEX: 36.45 KG/M2 | DIASTOLIC BLOOD PRESSURE: 70 MMHG | SYSTOLIC BLOOD PRESSURE: 126 MMHG | WEIGHT: 205.69 LBS | TEMPERATURE: 98 F | OXYGEN SATURATION: 99 %

## 2023-07-14 DIAGNOSIS — E11.59 HYPERTENSION ASSOCIATED WITH TYPE 2 DIABETES MELLITUS: Primary | ICD-10-CM

## 2023-07-14 DIAGNOSIS — I15.2 HYPERTENSION ASSOCIATED WITH TYPE 2 DIABETES MELLITUS: Primary | ICD-10-CM

## 2023-07-14 DIAGNOSIS — D84.821 DRUG-INDUCED IMMUNODEFICIENCY: ICD-10-CM

## 2023-07-14 DIAGNOSIS — R60.0 BILATERAL LEG EDEMA: ICD-10-CM

## 2023-07-14 DIAGNOSIS — Z79.899 DRUG-INDUCED IMMUNODEFICIENCY: ICD-10-CM

## 2023-07-14 PROCEDURE — 1101F PR PT FALLS ASSESS DOC 0-1 FALLS W/OUT INJ PAST YR: ICD-10-PCS | Mod: CPTII,,, | Performed by: INTERNAL MEDICINE

## 2023-07-14 PROCEDURE — 3078F PR MOST RECENT DIASTOLIC BLOOD PRESSURE < 80 MM HG: ICD-10-PCS | Mod: CPTII,,, | Performed by: INTERNAL MEDICINE

## 2023-07-14 PROCEDURE — 3288F PR FALLS RISK ASSESSMENT DOCUMENTED: ICD-10-PCS | Mod: CPTII,,, | Performed by: INTERNAL MEDICINE

## 2023-07-14 PROCEDURE — 1160F PR REVIEW ALL MEDS BY PRESCRIBER/CLIN PHARMACIST DOCUMENTED: ICD-10-PCS | Mod: CPTII,,, | Performed by: INTERNAL MEDICINE

## 2023-07-14 PROCEDURE — 3078F DIAST BP <80 MM HG: CPT | Mod: CPTII,,, | Performed by: INTERNAL MEDICINE

## 2023-07-14 PROCEDURE — 3074F PR MOST RECENT SYSTOLIC BLOOD PRESSURE < 130 MM HG: ICD-10-PCS | Mod: CPTII,,, | Performed by: INTERNAL MEDICINE

## 2023-07-14 PROCEDURE — 99214 PR OFFICE/OUTPT VISIT, EST, LEVL IV, 30-39 MIN: ICD-10-PCS | Mod: ,,, | Performed by: INTERNAL MEDICINE

## 2023-07-14 PROCEDURE — 1160F RVW MEDS BY RX/DR IN RCRD: CPT | Mod: CPTII,,, | Performed by: INTERNAL MEDICINE

## 2023-07-14 PROCEDURE — 1101F PT FALLS ASSESS-DOCD LE1/YR: CPT | Mod: CPTII,,, | Performed by: INTERNAL MEDICINE

## 2023-07-14 PROCEDURE — 99214 OFFICE O/P EST MOD 30 MIN: CPT | Mod: ,,, | Performed by: INTERNAL MEDICINE

## 2023-07-14 PROCEDURE — 3074F SYST BP LT 130 MM HG: CPT | Mod: CPTII,,, | Performed by: INTERNAL MEDICINE

## 2023-07-14 PROCEDURE — 3288F FALL RISK ASSESSMENT DOCD: CPT | Mod: CPTII,,, | Performed by: INTERNAL MEDICINE

## 2023-07-14 PROCEDURE — 1159F MED LIST DOCD IN RCRD: CPT | Mod: CPTII,,, | Performed by: INTERNAL MEDICINE

## 2023-07-14 PROCEDURE — 1159F PR MEDICATION LIST DOCUMENTED IN MEDICAL RECORD: ICD-10-PCS | Mod: CPTII,,, | Performed by: INTERNAL MEDICINE

## 2023-07-14 RX ORDER — FUROSEMIDE 20 MG/1
20 TABLET ORAL DAILY
Qty: 2 TABLET | Refills: 6 | Status: SHIPPED | OUTPATIENT
Start: 2023-07-14 | End: 2023-07-19 | Stop reason: SDUPTHER

## 2023-07-14 NOTE — PROGRESS NOTES
"Subjective:      Patient ID: Edyta Rubio is a 78 y.o. female.    Chief Complaint: Leg Swelling    HPI    Patient here for an appt for LE edema  Bilateral onset  Occurred after starting doxazosin  BP controled but swelling began  Compliant with medication  Reports swelling has improved today but still present    HTN:  Elevated at last OV  Added Doxazosin  She remained on losartan and HCTZ    DM:  No longer on metformin secondary to diarrhea  She wanted to retry Trulicity to lose weight  Actos increased to 30 mg po daily  Trulicity at 0.75 mgsubQ weekly    SLE, Drug induced immunodeficiency:  Compliant with plaquenil through Rheumatology  stable  Health Maintenance Due   Topic Date Due    Hepatitis C Screening  Never done    Shingles Vaccine (1 of 2) Never done    Eye Exam  11/12/2022     Review of Systems   Cardiovascular:  Positive for leg swelling.     Objective:     Vitals:    07/14/23 1016   BP: 126/70   BP Location: Left arm   Patient Position: Sitting   BP Method: Large (Automatic)   Pulse: 84   Resp: 16   Temp: 97.9 °F (36.6 °C)   TempSrc: Temporal   SpO2: 99%   Weight: 93.3 kg (205 lb 11.2 oz)   Height: 5' 3" (1.6 m)     Physical Exam  Vitals and nursing note reviewed.   Constitutional:       Appearance: Normal appearance.   Musculoskeletal:      Right lower leg: Edema present.      Left lower leg: Edema present.   Neurological:      Mental Status: She is alert and oriented to person, place, and time.   Psychiatric:         Behavior: Behavior normal.     Assessment/Plan:     1. Hypertension associated with type 2 diabetes mellitus  D/c doxazosin  Continue other medications for BP control  F/u next week to see if she will need medication  We may need to try hydralazine 25 mg po BID for her instead  2. Drug-induced immunodeficiency  Stable, continue plaquenil  Blood counts are stable, afebrile  3. Bilateral leg edema  Peripheral edema is 2+ bilateral today  Lasix 20 mg po daily x 2 days  Low sodium " diet  Compression hose  D/c doxazosin  F/u in 1 week   Other orders  -     furosemide (LASIX) 20 MG tablet; Take 1 tablet (20 mg total) by mouth once daily. for 2 days  Dispense: 2 tablet; Refill: 6       Follow up in about 5 days (around 7/19/2023) for BP Check 1 pm add on with me .    This includes face to face time and non-face to face time preparing to see the patient (eg, review of tests), obtaining and/or reviewing separately obtained history, documenting clinical information in the electronic or other health record, independently interpreting results and communicating results to the patient/family/caregiver, or care coordinator.

## 2023-07-19 ENCOUNTER — OFFICE VISIT (OUTPATIENT)
Dept: FAMILY MEDICINE | Facility: CLINIC | Age: 78
End: 2023-07-19
Payer: MEDICARE

## 2023-07-19 VITALS
DIASTOLIC BLOOD PRESSURE: 58 MMHG | TEMPERATURE: 98 F | OXYGEN SATURATION: 96 % | HEART RATE: 82 BPM | BODY MASS INDEX: 36.61 KG/M2 | HEIGHT: 63 IN | SYSTOLIC BLOOD PRESSURE: 128 MMHG | WEIGHT: 206.63 LBS | RESPIRATION RATE: 16 BRPM

## 2023-07-19 DIAGNOSIS — R60.0 PERIPHERAL EDEMA: ICD-10-CM

## 2023-07-19 DIAGNOSIS — E11.59 HYPERTENSION ASSOCIATED WITH TYPE 2 DIABETES MELLITUS: Primary | ICD-10-CM

## 2023-07-19 DIAGNOSIS — I15.2 HYPERTENSION ASSOCIATED WITH TYPE 2 DIABETES MELLITUS: Primary | ICD-10-CM

## 2023-07-19 PROCEDURE — 1101F PR PT FALLS ASSESS DOC 0-1 FALLS W/OUT INJ PAST YR: ICD-10-PCS | Mod: CPTII,,, | Performed by: INTERNAL MEDICINE

## 2023-07-19 PROCEDURE — 3074F SYST BP LT 130 MM HG: CPT | Mod: CPTII,,, | Performed by: INTERNAL MEDICINE

## 2023-07-19 PROCEDURE — 3078F PR MOST RECENT DIASTOLIC BLOOD PRESSURE < 80 MM HG: ICD-10-PCS | Mod: CPTII,,, | Performed by: INTERNAL MEDICINE

## 2023-07-19 PROCEDURE — 3288F PR FALLS RISK ASSESSMENT DOCUMENTED: ICD-10-PCS | Mod: CPTII,,, | Performed by: INTERNAL MEDICINE

## 2023-07-19 PROCEDURE — 1160F PR REVIEW ALL MEDS BY PRESCRIBER/CLIN PHARMACIST DOCUMENTED: ICD-10-PCS | Mod: CPTII,,, | Performed by: INTERNAL MEDICINE

## 2023-07-19 PROCEDURE — 1101F PT FALLS ASSESS-DOCD LE1/YR: CPT | Mod: CPTII,,, | Performed by: INTERNAL MEDICINE

## 2023-07-19 PROCEDURE — 1160F RVW MEDS BY RX/DR IN RCRD: CPT | Mod: CPTII,,, | Performed by: INTERNAL MEDICINE

## 2023-07-19 PROCEDURE — 99214 OFFICE O/P EST MOD 30 MIN: CPT | Mod: ,,, | Performed by: INTERNAL MEDICINE

## 2023-07-19 PROCEDURE — 3074F PR MOST RECENT SYSTOLIC BLOOD PRESSURE < 130 MM HG: ICD-10-PCS | Mod: CPTII,,, | Performed by: INTERNAL MEDICINE

## 2023-07-19 PROCEDURE — 3078F DIAST BP <80 MM HG: CPT | Mod: CPTII,,, | Performed by: INTERNAL MEDICINE

## 2023-07-19 PROCEDURE — 1159F PR MEDICATION LIST DOCUMENTED IN MEDICAL RECORD: ICD-10-PCS | Mod: CPTII,,, | Performed by: INTERNAL MEDICINE

## 2023-07-19 PROCEDURE — 3288F FALL RISK ASSESSMENT DOCD: CPT | Mod: CPTII,,, | Performed by: INTERNAL MEDICINE

## 2023-07-19 PROCEDURE — 99214 PR OFFICE/OUTPT VISIT, EST, LEVL IV, 30-39 MIN: ICD-10-PCS | Mod: ,,, | Performed by: INTERNAL MEDICINE

## 2023-07-19 PROCEDURE — 1159F MED LIST DOCD IN RCRD: CPT | Mod: CPTII,,, | Performed by: INTERNAL MEDICINE

## 2023-07-19 RX ORDER — FUROSEMIDE 20 MG/1
20 TABLET ORAL DAILY
Qty: 2 TABLET | Refills: 6 | Status: SHIPPED | OUTPATIENT
Start: 2023-07-19 | End: 2023-08-28

## 2023-07-19 NOTE — PROGRESS NOTES
"Subjective:      Patient ID: Edyta Rubio is a 78 y.o. female.    Chief Complaint: Hypertension    HPI  Follow up for HTN and EDEMA  At last OV patient felt like her doxazosin made swelling in legs worse  She stopped it and says swelling is very slightly better  BP has held up normal with taking it away    Health Maintenance Due   Topic Date Due    Hepatitis C Screening  Never done    Shingles Vaccine (1 of 2) Never done    Eye Exam  11/12/2022     Review of Systems   Constitutional:  Negative for chills and fever.   HENT:  Negative for congestion, sinus pressure and sore throat.    Respiratory:  Negative for cough and shortness of breath.    Cardiovascular:  Negative for chest pain and palpitations.   Gastrointestinal:  Negative for abdominal pain and nausea.   Skin:  Negative for rash.     Objective:     Vitals:    07/19/23 1301   BP: (!) 128/58   BP Location: Left arm   Patient Position: Sitting   BP Method: Large (Manual)   Pulse: 82   Resp: 16   Temp: 98.1 °F (36.7 °C)   TempSrc: Temporal   SpO2: 96%   Weight: 93.7 kg (206 lb 9.6 oz)   Height: 5' 3" (1.6 m)     Physical Exam  Vitals and nursing note reviewed.   Constitutional:       Appearance: Normal appearance.   HENT:      Head: Normocephalic and atraumatic.   Musculoskeletal:      Right lower leg: Edema present.      Left lower leg: Edema present.   Neurological:      Mental Status: She is alert.   Psychiatric:         Behavior: Behavior normal.     Assessment/Plan:     1. Hypertension associated with type 2 diabetes mellitus  Stable ccm  2. Peripheral edema  Patient still with edema  She never picked up Rx for lasix  New Rx sent over today- will have nurse verify to ensure they received it at pharmacy  Take 20 mg lasix daily for 2 days lets see if swelling improves  She declines compression hose  Now if sx fail to improve or swelling returns quickly, I will discuss cardiology referral to see if this needs to be worked up further  She agrees to plan  Other " orders  -     furosemide (LASIX) 20 MG tablet; Take 1 tablet (20 mg total) by mouth once daily. for 2 days  Dispense: 2 tablet; Refill: 6       No follow-ups on file.    I spent a total of 15 minutes on the day of the visit.This includes face to face time and non-face to face time preparing to see the patient (eg, review of tests), obtaining and/or reviewing separately obtained history, documenting clinical information in the electronic or other health record, independently interpreting results and communicating results to the patient/family/caregiver, or care coordinator.

## 2023-08-03 ENCOUNTER — TELEPHONE (OUTPATIENT)
Dept: FAMILY MEDICINE | Facility: CLINIC | Age: 78
End: 2023-08-03
Payer: MEDICARE

## 2023-08-03 NOTE — TELEPHONE ENCOUNTER
"Spoke with pt  She stated her legs are swollen and she is starting to get "blisters"   I did advise that she needs to come in to be evaluated and we have an appt with the NP on tomorrow at 1020   Pt refused appt stating that she does not like to be seen by the NP  I did try to reassure her that our NP works along side our drs and would have all the information she needs to treat her the best way we can  She still refused saying that she has an appt with dr at the end of the month and will wait for that appt  I did advise that it should not go untreated for that long  Pt stated she will wait    I called Dr Rooney she advised for me to call pt back and advise her to come in to be seen by NP tomorrow and that we can get her treated    Called pt back  She refused appt and stated that if she feels like she needs to be seen she will call us  I did let her know that we advise that she gets evaluated so we can treat her as soon as possible given her health issues  Pt expressed understanding and still refused    Advised Dr Rooney that pt refused  Expressed understanding  "

## 2023-08-03 NOTE — TELEPHONE ENCOUNTER
----- Message from Charity Betts sent at 8/3/2023 10:12 AM CDT -----  Regarding: med advice  .Type:  Needs Medical Advice    Who Called: Pt  Symptoms (please be specific): Swollen legs, blisters   How long has patient had these symptoms:    Pharmacy name and phone #:    Would the patient rather a call back or a response via [facility]? Call back  Best Call Back Number: [phone]  Additional Information: Would like nurse to f/u, wants to know if it has something to do with her being a diabetic.

## 2023-08-07 DIAGNOSIS — E78.5 HYPERLIPIDEMIA ASSOCIATED WITH TYPE 2 DIABETES MELLITUS: Primary | ICD-10-CM

## 2023-08-07 DIAGNOSIS — E11.69 HYPERLIPIDEMIA ASSOCIATED WITH TYPE 2 DIABETES MELLITUS: Primary | ICD-10-CM

## 2023-08-07 RX ORDER — ATORVASTATIN CALCIUM 40 MG/1
TABLET, FILM COATED ORAL
Qty: 90 TABLET | Refills: 3 | Status: SHIPPED | OUTPATIENT
Start: 2023-08-07

## 2023-08-28 ENCOUNTER — OFFICE VISIT (OUTPATIENT)
Dept: FAMILY MEDICINE | Facility: CLINIC | Age: 78
End: 2023-08-28
Payer: MEDICARE

## 2023-08-28 ENCOUNTER — CLINICAL SUPPORT (OUTPATIENT)
Dept: FAMILY MEDICINE | Facility: CLINIC | Age: 78
End: 2023-08-28
Attending: INTERNAL MEDICINE
Payer: MEDICARE

## 2023-08-28 VITALS
RESPIRATION RATE: 18 BRPM | TEMPERATURE: 98 F | HEIGHT: 63 IN | DIASTOLIC BLOOD PRESSURE: 71 MMHG | OXYGEN SATURATION: 95 % | WEIGHT: 200.81 LBS | SYSTOLIC BLOOD PRESSURE: 136 MMHG | HEART RATE: 77 BPM | BODY MASS INDEX: 35.58 KG/M2

## 2023-08-28 DIAGNOSIS — E11.65 TYPE 2 DIABETES MELLITUS WITH HYPERGLYCEMIA, WITHOUT LONG-TERM CURRENT USE OF INSULIN: Primary | ICD-10-CM

## 2023-08-28 DIAGNOSIS — I15.2 HYPERTENSION ASSOCIATED WITH TYPE 2 DIABETES MELLITUS: ICD-10-CM

## 2023-08-28 DIAGNOSIS — R60.0 BILATERAL LOWER EXTREMITY EDEMA: ICD-10-CM

## 2023-08-28 DIAGNOSIS — E11.59 HYPERTENSION ASSOCIATED WITH TYPE 2 DIABETES MELLITUS: ICD-10-CM

## 2023-08-28 DIAGNOSIS — E11.69 HYPERLIPIDEMIA ASSOCIATED WITH TYPE 2 DIABETES MELLITUS: ICD-10-CM

## 2023-08-28 DIAGNOSIS — E78.5 HYPERLIPIDEMIA ASSOCIATED WITH TYPE 2 DIABETES MELLITUS: ICD-10-CM

## 2023-08-28 DIAGNOSIS — E11.65 TYPE 2 DIABETES MELLITUS WITH HYPERGLYCEMIA, WITHOUT LONG-TERM CURRENT USE OF INSULIN: ICD-10-CM

## 2023-08-28 PROCEDURE — 99214 OFFICE O/P EST MOD 30 MIN: CPT | Mod: ,,, | Performed by: INTERNAL MEDICINE

## 2023-08-28 PROCEDURE — 3288F FALL RISK ASSESSMENT DOCD: CPT | Mod: CPTII,,, | Performed by: INTERNAL MEDICINE

## 2023-08-28 PROCEDURE — 92228 PR REMOTE IMAGE RETINA, MONITOR/MANAGE ACTIVE DISEASE: ICD-10-PCS | Mod: TC,,, | Performed by: INTERNAL MEDICINE

## 2023-08-28 PROCEDURE — 2025F DIABETIC EYE SCREENING PHOTO: ICD-10-PCS | Mod: CPTII,,, | Performed by: OPHTHALMOLOGY

## 2023-08-28 PROCEDURE — 1160F RVW MEDS BY RX/DR IN RCRD: CPT | Mod: CPTII,,, | Performed by: INTERNAL MEDICINE

## 2023-08-28 PROCEDURE — 2025F 7 FLD RTA PHOTO W/O RTNOPTHY: CPT | Mod: CPTII,,, | Performed by: OPHTHALMOLOGY

## 2023-08-28 PROCEDURE — 1125F PR PAIN SEVERITY QUANTIFIED, PAIN PRESENT: ICD-10-PCS | Mod: CPTII,,, | Performed by: INTERNAL MEDICINE

## 2023-08-28 PROCEDURE — 3075F PR MOST RECENT SYSTOLIC BLOOD PRESS GE 130-139MM HG: ICD-10-PCS | Mod: CPTII,,, | Performed by: INTERNAL MEDICINE

## 2023-08-28 PROCEDURE — 1159F PR MEDICATION LIST DOCUMENTED IN MEDICAL RECORD: ICD-10-PCS | Mod: CPTII,,, | Performed by: INTERNAL MEDICINE

## 2023-08-28 PROCEDURE — 3075F SYST BP GE 130 - 139MM HG: CPT | Mod: CPTII,,, | Performed by: INTERNAL MEDICINE

## 2023-08-28 PROCEDURE — 3078F PR MOST RECENT DIASTOLIC BLOOD PRESSURE < 80 MM HG: ICD-10-PCS | Mod: CPTII,,, | Performed by: INTERNAL MEDICINE

## 2023-08-28 PROCEDURE — 99214 PR OFFICE/OUTPT VISIT, EST, LEVL IV, 30-39 MIN: ICD-10-PCS | Mod: ,,, | Performed by: INTERNAL MEDICINE

## 2023-08-28 PROCEDURE — 1159F MED LIST DOCD IN RCRD: CPT | Mod: CPTII,,, | Performed by: INTERNAL MEDICINE

## 2023-08-28 PROCEDURE — 1160F PR REVIEW ALL MEDS BY PRESCRIBER/CLIN PHARMACIST DOCUMENTED: ICD-10-PCS | Mod: CPTII,,, | Performed by: INTERNAL MEDICINE

## 2023-08-28 PROCEDURE — 92228 IMG RTA DETC/MNTR DS PHY/QHP: CPT | Mod: TC,,, | Performed by: INTERNAL MEDICINE

## 2023-08-28 PROCEDURE — 92228 DIABETIC EYE SCREENING PHOTO: ICD-10-PCS | Mod: 26,,, | Performed by: OPHTHALMOLOGY

## 2023-08-28 PROCEDURE — 1101F PT FALLS ASSESS-DOCD LE1/YR: CPT | Mod: CPTII,,, | Performed by: INTERNAL MEDICINE

## 2023-08-28 PROCEDURE — 1125F AMNT PAIN NOTED PAIN PRSNT: CPT | Mod: CPTII,,, | Performed by: INTERNAL MEDICINE

## 2023-08-28 PROCEDURE — 92228 IMG RTA DETC/MNTR DS PHY/QHP: CPT | Mod: 26,,, | Performed by: OPHTHALMOLOGY

## 2023-08-28 PROCEDURE — 3288F PR FALLS RISK ASSESSMENT DOCUMENTED: ICD-10-PCS | Mod: CPTII,,, | Performed by: INTERNAL MEDICINE

## 2023-08-28 PROCEDURE — 1101F PR PT FALLS ASSESS DOC 0-1 FALLS W/OUT INJ PAST YR: ICD-10-PCS | Mod: CPTII,,, | Performed by: INTERNAL MEDICINE

## 2023-08-28 PROCEDURE — 3078F DIAST BP <80 MM HG: CPT | Mod: CPTII,,, | Performed by: INTERNAL MEDICINE

## 2023-08-28 RX ORDER — DOXAZOSIN 2 MG/1
2 TABLET ORAL DAILY
COMMUNITY
Start: 2023-08-21 | End: 2023-08-28

## 2023-08-28 RX ORDER — LOSARTAN POTASSIUM 50 MG/1
50 TABLET ORAL 2 TIMES DAILY
COMMUNITY
End: 2024-03-04

## 2023-08-28 NOTE — PROGRESS NOTES
Edyta Rubio is a 78 y.o. female here for a diabetic eye screening with non-dilated fundus photos per .    Patient cooperative?: Yes  Small pupils?: No  Last eye exam: 11/12/2021    For exam results, see Encounter Report.

## 2023-08-28 NOTE — PROGRESS NOTES
"Subjective:      Patient ID: Edyta Rubio is a 78 y.o. female.    Chief Complaint: Follow-up (Both legs still swollen, says "not as much but still swollen" and still having thigh pain/She stopped taking some medications due to the swelling. Lyrica, losartan 100mg and doxazosin )    HPI    Last wellness 11/1/2022     LE edema  Bilateral onset  Occurred after starting doxazosin  Provided short term lasix at last OV  Patient reports swelling persisted, she has now stopped taking lyrica as well  She says swelling comes/goes without reason, she says she watches diet closely to avoid adding salt  Not using lasix now     HTN:  She remained on losartan and HCTZ     DM:  No longer on metformin secondary to diarrhea  Actos increased to 30 mg po daily  Trulicity at 0.75 mgsubQ weekly  Foot exam no DN 08/28/2023   Eye exam: in office 08/28/2023     SLE, Drug induced immunodeficiency:  Compliant with plaquenil through Rheumatology  Stable    HLD: on statin therapy; no myalgias    GERD: on PPI; sx controlled       ANEMIA worse; s/p EGD and colonoscopy, hematology Dr. Maru Jenkins following; DAMIAN and B12 deficiency. on supplement for both Says that she felt sick from iron supplement and was not taking daily; H/H  improved with liquid iron-  -patient has now seen hematology at [facility]  -scheduled for bone marrow biopsy 09/06/2023    Osteoporosis: diagnosed 2023, advised for Boniva but waiting for bone marrow biopsy results to be completed         MRI lumbar spine 3/21 with OA and disc bulging lumbar/sacral area  Patient with persistent lower back and thigh pain  never saw any specialists to f/u her MRI lumbar spine and pain issues  She says she knows we referred her to Dr. Ferguson to evaluate and she say she wasn't ready to go through with this  She also declines PT and neurosurgery evaluation    Mammogram 04/28/2023 negative for malignancy  DEXA 2023 with osteoporosis  COVID 19 vaccine: Moderna x 3 doses  ADL: complete and " "intact  colonoscopy: 2014 with 1 polyp; 6/2019 with polyps Dr. Thomas flu shot, declines  HCPOA: spouse  Hearing: passes whisper test at 5 feet   stress done a few years ago; does not see a cardiologist  prevnar 1/23/19  pneumovax 8/31/2020  shingrix vaccine: due- will work on at next visit with Rx  Health Maintenance Due   Topic Date Due    Hepatitis C Screening  Never done    Shingles Vaccine (1 of 2) Never done    Eye Exam  11/12/2022     Review of Systems   Cardiovascular:  Positive for leg swelling.       Objective:     Vitals:    08/28/23 0839   BP: 136/71   Pulse: 77   Resp: 18   Temp: 98 °F (36.7 °C)   SpO2: 95%   Weight: 91.1 kg (200 lb 12.8 oz)   Height: 5' 3" (1.6 m)     Physical Exam  Vitals and nursing note reviewed.   Constitutional:       Appearance: Normal appearance.   Neurological:      Mental Status: She is alert and oriented to person, place, and time.   Psychiatric:         Behavior: Behavior normal.     Protective Sensation (w/ 10 gram monofilament):  Right: Intact  Left: Intact    Visual Inspection:  Normal -  Bilateral    Pedal Pulses:   Right: Diminished  Left: Diminished    Posterior Tibialis Pulses:   Right:Diminished  Left: Diminished    Assessment/Plan:     1. Type 2 diabetes mellitus with hyperglycemia, without long-term current use of insulin  -     Comprehensive Metabolic Panel; Future; Expected date: 10/28/2023  -     Lipid Panel; Future; Expected date: 10/28/2023  -     CBC Auto Differential; Future; Expected date: 10/28/2023  -     Hemoglobin A1C; Future; Expected date: 10/28/2023  -     Microalbumin/Creatinine Ratio, Urine; Future; Expected date: 10/28/2023  -     Diabetic Eye Screening Photo; Future  Stable  Eye exam today  Foot exam done  Continue current Rx  2. Hypertension associated with type 2 diabetes mellitus  -     Comprehensive Metabolic Panel; Future; Expected date: 10/28/2023  -     Lipid Panel; Future; Expected date: 10/28/2023  -     CBC Auto Differential; Future; " Expected date: 10/28/2023  -     Hemoglobin A1C; Future; Expected date: 10/28/2023  -     Microalbumin/Creatinine Ratio, Urine; Future; Expected date: 10/28/2023  Stable, continue current Rx  3. Hyperlipidemia associated with type 2 diabetes mellitus  -     Comprehensive Metabolic Panel; Future; Expected date: 10/28/2023  -     Lipid Panel; Future; Expected date: 10/28/2023  -     CBC Auto Differential; Future; Expected date: 10/28/2023  -     Hemoglobin A1C; Future; Expected date: 10/28/2023  -     Microalbumin/Creatinine Ratio, Urine; Future; Expected date: 10/28/2023  Stable, continue current Rx  4. Bilateral lower extremity edema  -     Echo; Future  -     CV US Lower Extremity Veins Bilateral Insufficiency; Future  Unsure of etiology  Lyrica should not have caused swelling  Check echocardiogram and LE venous ultrasound as well  Pending results will discuss next steps  Low sodium diet recommended       Patient scheduled for bone marrow biopsy for anemia with [facility] on 09/06/2023  We will wait to hear results  Will discuss Boniva for patient pending results at her next OV         Follow up in about 3 months (around 11/28/2023) for Medicare Wellness- fasting labs due before.    This includes face to face time and non-face to face time preparing to see the patient (eg, review of tests), obtaining and/or reviewing separately obtained history, documenting clinical information in the electronic or other health record, independently interpreting results and communicating results to the patient/family/caregiver, or care coordinator.

## 2023-09-07 DIAGNOSIS — E11.65 TYPE 2 DIABETES MELLITUS WITH HYPERGLYCEMIA, UNSPECIFIED WHETHER LONG TERM INSULIN USE: ICD-10-CM

## 2023-09-07 DIAGNOSIS — E11.65 TYPE 2 DIABETES MELLITUS WITH HYPERGLYCEMIA, WITHOUT LONG-TERM CURRENT USE OF INSULIN: ICD-10-CM

## 2023-09-07 RX ORDER — ISOPROPYL ALCOHOL 70 ML/100ML
SWAB TOPICAL
Qty: 100 EACH | Refills: 11 | Status: SHIPPED | OUTPATIENT
Start: 2023-09-07

## 2023-09-13 ENCOUNTER — HOSPITAL ENCOUNTER (OUTPATIENT)
Dept: CARDIOLOGY | Facility: HOSPITAL | Age: 78
Discharge: HOME | End: 2023-09-13
Attending: INTERNAL MEDICINE
Payer: MEDICARE

## 2023-09-13 DIAGNOSIS — R60.0 BILATERAL LOWER EXTREMITY EDEMA: ICD-10-CM

## 2023-09-13 LAB
AV INDEX (PROSTH): 0.63
AV MEAN GRADIENT: 8 MMHG
AV PEAK GRADIENT: 14 MMHG
AV VALVE AREA BY VELOCITY RATIO: 1.71 CM²
AV VALVE AREA: 1.97 CM²
AV VELOCITY RATIO: 0.54
CV ECHO LV RWT: 0.35 CM
DOP CALC AO PEAK VEL: 1.89 M/S
DOP CALC AO VTI: 45.3 CM
DOP CALC LVOT AREA: 3.1 CM2
DOP CALC LVOT DIAMETER: 2 CM
DOP CALC LVOT PEAK VEL: 1.03 M/S
DOP CALC LVOT STROKE VOLUME: 89.18 CM3
DOP CALC MV VTI: 34.9 CM
DOP CALCLVOT PEAK VEL VTI: 28.4 CM
E WAVE DECELERATION TIME: 166 MSEC
E/A RATIO: 1.01
E/E' RATIO: 16.11 M/S
ECHO LV POSTERIOR WALL: 0.9 CM (ref 0.6–1.1)
FRACTIONAL SHORTENING: 42 % (ref 28–44)
GLOBAL LONGITUIDAL STRAIN: 19.1 %
HR MV ECHO: 79 BPM
INTERVENTRICULAR SEPTUM: 1 CM (ref 0.6–1.1)
LEFT ATRIUM SIZE: 4.4 CM
LEFT INTERNAL DIMENSION IN SYSTOLE: 3 CM (ref 2.1–4)
LEFT VENTRICLE DIASTOLIC VOLUME: 130 ML
LEFT VENTRICLE SYSTOLIC VOLUME: 35 ML
LEFT VENTRICULAR INTERNAL DIMENSION IN DIASTOLE: 5.2 CM (ref 3.5–6)
LEFT VENTRICULAR MASS: 181.4 G
LV LATERAL E/E' RATIO: 16.11 M/S
LV SEPTAL E/E' RATIO: 16.11 M/S
LVOT MG: 2 MMHG
LVOT MV: 0.71 CM/S
MV MEAN GRADIENT: 5 MMHG
MV PEAK A VEL: 1.43 M/S
MV PEAK E VEL: 1.45 M/S
MV PEAK GRADIENT: 9 MMHG
MV STENOSIS PRESSURE HALF TIME: 79 MS
MV VALVE AREA BY CONTINUITY EQUATION: 2.56 CM2
MV VALVE AREA P 1/2 METHOD: 2.78 CM2
OHS LV EJECTION FRACTION SIMPSONS BIPLANE MOD: 64 %
PISA TR MAX VEL: 2.4 M/S
RA PRESSURE ESTIMATED: 3 MMHG
RV TB RVSP: 5 MMHG
SINUS: 3 CM
TDI LATERAL: 0.09 M/S
TDI SEPTAL: 0.09 M/S
TDI: 0.09 M/S
TR MAX PG: 23 MMHG
TRICUSPID ANNULAR PLANE SYSTOLIC EXCURSION: 2.42 CM
TV REST PULMONARY ARTERY PRESSURE: 26 MMHG

## 2023-09-13 PROCEDURE — 93970 EXTREMITY STUDY: CPT | Mod: TC

## 2023-09-13 PROCEDURE — 93306 TTE W/DOPPLER COMPLETE: CPT | Mod: 26,,, | Performed by: INTERNAL MEDICINE

## 2023-09-13 PROCEDURE — 93306 TTE W/DOPPLER COMPLETE: CPT

## 2023-09-13 PROCEDURE — 93970 EXTREMITY STUDY: CPT | Mod: 26,,, | Performed by: SURGERY

## 2023-09-13 PROCEDURE — 93970 CV US LOWER VENOUS INSUFFICIENCY BILATERAL (CUPID ONLY): ICD-10-PCS | Mod: 26,,, | Performed by: SURGERY

## 2023-09-13 PROCEDURE — 93306 ECHO (CUPID ONLY): ICD-10-PCS | Mod: 26,,, | Performed by: INTERNAL MEDICINE

## 2023-09-14 ENCOUNTER — TELEPHONE (OUTPATIENT)
Dept: FAMILY MEDICINE | Facility: CLINIC | Age: 78
End: 2023-09-14
Payer: MEDICARE

## 2023-09-14 DIAGNOSIS — I50.32 CHRONIC DIASTOLIC CONGESTIVE HEART FAILURE: Primary | ICD-10-CM

## 2023-09-14 DIAGNOSIS — R60.0 BILATERAL LOWER EXTREMITY EDEMA: ICD-10-CM

## 2023-09-14 NOTE — TELEPHONE ENCOUNTER
I have signed for the following orders AND/OR meds. Please notify the patient and ask the patient to schedule the testing and/or information about any medications that were sent.         Orders Placed This Encounter   Procedures    Ambulatory referral/consult to Cardiology     Standing Status:   Future     Standing Expiration Date:   10/14/2024     Referral Priority:   Routine     Referral Type:   Consultation     Referral Reason:   Specialty Services Required     Referred to Provider:   Arnie Mena MD     Requested Specialty:   Cardiology     Number of Visits Requested:   1

## 2023-09-14 NOTE — TELEPHONE ENCOUNTER
Called patient today to discuss test results of heart u/s and she mentioned her lupus medication has gone up in price so she isn't taking it. She confirmed that is was plaquenil and I advised her this was prescribed by Dr. Owens and to give his office a call. She voiced understanding. Sony

## 2023-09-14 NOTE — TELEPHONE ENCOUNTER
----- Message from Jaleesa Reaves LPN sent at 9/14/2023  8:51 AM CDT -----  Patient and  notified of results and voiced understanding. She is ok with a cardiology referral. Sony

## 2023-10-02 DIAGNOSIS — E11.65 TYPE 2 DIABETES MELLITUS WITH HYPERGLYCEMIA, WITHOUT LONG-TERM CURRENT USE OF INSULIN: ICD-10-CM

## 2023-10-02 RX ORDER — BLOOD SUGAR DIAGNOSTIC
STRIP MISCELLANEOUS 3 TIMES DAILY
Qty: 300 STRIP | Refills: 3 | Status: SHIPPED | OUTPATIENT
Start: 2023-10-02

## 2023-11-18 DIAGNOSIS — E11.65 UNCONTROLLED TYPE 2 DIABETES MELLITUS WITH HYPERGLYCEMIA: ICD-10-CM

## 2023-11-20 RX ORDER — HYDROCHLOROTHIAZIDE 12.5 MG/1
CAPSULE ORAL
Qty: 90 CAPSULE | Refills: 4 | Status: SHIPPED | OUTPATIENT
Start: 2023-11-20 | End: 2024-01-19 | Stop reason: SDUPTHER

## 2023-12-06 ENCOUNTER — TELEPHONE (OUTPATIENT)
Dept: ADMINISTRATIVE | Facility: HOSPITAL | Age: 78
End: 2023-12-06
Payer: MEDICARE

## 2023-12-06 VITALS — DIASTOLIC BLOOD PRESSURE: 70 MMHG | SYSTOLIC BLOOD PRESSURE: 139 MMHG

## 2023-12-06 NOTE — TELEPHONE ENCOUNTER
Called patient for blood pressure check. Patient reports she took her blood pressure this morning and it was 139/70

## 2023-12-13 LAB
ALBUMIN SERPL-MCNC: 4.2 G/DL (ref 3.8–4.8)
ALBUMIN/CREAT UR: <13 MG/G CREAT (ref 0–29)
ALBUMIN/GLOB SERPL: 1.4 {RATIO} (ref 1.2–2.2)
ALP SERPL-CCNC: 86 IU/L (ref 44–121)
ALT SERPL-CCNC: 8 IU/L (ref 0–32)
AST SERPL-CCNC: 14 IU/L (ref 0–40)
BASOPHILS # BLD AUTO: 0 X10E3/UL (ref 0–0.2)
BASOPHILS NFR BLD AUTO: 1 %
BILIRUB SERPL-MCNC: 0.3 MG/DL (ref 0–1.2)
BUN SERPL-MCNC: 26 MG/DL (ref 8–27)
BUN/CREAT SERPL: 23 (ref 12–28)
CALCIUM SERPL-MCNC: 9.6 MG/DL (ref 8.7–10.3)
CHLORIDE SERPL-SCNC: 102 MMOL/L (ref 96–106)
CHOLEST SERPL-MCNC: 132 MG/DL (ref 100–199)
CO2 SERPL-SCNC: 21 MMOL/L (ref 20–29)
CREAT SERPL-MCNC: 1.15 MG/DL (ref 0.57–1)
CREAT UR-MCNC: 92.2 MG/DL
EOSINOPHIL # BLD AUTO: 0.2 X10E3/UL (ref 0–0.4)
EOSINOPHIL NFR BLD AUTO: 3 %
ERYTHROCYTE [DISTWIDTH] IN BLOOD BY AUTOMATED COUNT: 14 % (ref 11.7–15.4)
EST. GFR  (NO RACE VARIABLE): 49 ML/MIN/1.73
GLOBULIN SER CALC-MCNC: 2.9 G/DL (ref 1.5–4.5)
GLUCOSE SERPL-MCNC: 109 MG/DL (ref 70–99)
HBA1C MFR BLD: 5.9 % (ref 4.8–5.6)
HCT VFR BLD AUTO: 26.5 % (ref 34–46.6)
HDLC SERPL-MCNC: 76 MG/DL
HGB BLD-MCNC: 8.4 G/DL (ref 11.1–15.9)
IMM GRANULOCYTES NFR BLD AUTO: 0 %
LDLC SERPL CALC-MCNC: 46 MG/DL (ref 0–99)
LYMPHOCYTES # BLD AUTO: 1.7 X10E3/UL (ref 0.7–3.1)
LYMPHOCYTES NFR BLD AUTO: 28 %
MCH RBC QN AUTO: 31.3 PG (ref 26.6–33)
MCHC RBC AUTO-ENTMCNC: 31.7 G/DL (ref 31.5–35.7)
MCV RBC AUTO: 99 FL (ref 79–97)
MICROALBUMIN UR-MCNC: <12 UG/ML
MONOCYTES # BLD AUTO: 0.5 X10E3/UL (ref 0.1–0.9)
MONOCYTES NFR BLD AUTO: 8 %
NEUTROPHILS # BLD AUTO: 3.6 X10E3/UL (ref 1.4–7)
NEUTROPHILS NFR BLD AUTO: 60 %
PLATELET # BLD AUTO: 184 X10E3/UL (ref 150–450)
POTASSIUM SERPL-SCNC: 4.2 MMOL/L (ref 3.5–5.2)
PROT SERPL-MCNC: 7.1 G/DL (ref 6–8.5)
RBC # BLD AUTO: 2.68 X10E6/UL (ref 3.77–5.28)
SODIUM SERPL-SCNC: 141 MMOL/L (ref 134–144)
SPECIMEN STATUS REPORT: NORMAL
TRIGL SERPL-MCNC: 39 MG/DL (ref 0–149)
VLDLC SERPL CALC-MCNC: 10 MG/DL (ref 5–40)
WBC # BLD AUTO: 6 X10E3/UL (ref 3.4–10.8)

## 2023-12-14 ENCOUNTER — TELEPHONE (OUTPATIENT)
Dept: FAMILY MEDICINE | Facility: CLINIC | Age: 78
End: 2023-12-14
Payer: MEDICARE

## 2023-12-14 DIAGNOSIS — D64.9 ANEMIA, UNSPECIFIED TYPE: Primary | ICD-10-CM

## 2023-12-14 NOTE — TELEPHONE ENCOUNTER
Please call patient, she did have abnormal labs, her RBC was listed as critically low we do need her to repeat her labs and scheduled for appointment, please either move an established follow up (can move to [facility] or push back appt with me) so that we can get her in here; labs entered

## 2023-12-18 LAB
BASOPHILS # BLD AUTO: 0 X10E3/UL (ref 0–0.2)
BASOPHILS NFR BLD AUTO: 1 %
EOSINOPHIL # BLD AUTO: 0.1 X10E3/UL (ref 0–0.4)
EOSINOPHIL NFR BLD AUTO: 2 %
ERYTHROCYTE [DISTWIDTH] IN BLOOD BY AUTOMATED COUNT: 14.3 % (ref 11.7–15.4)
FERRITIN SERPL-MCNC: 57 NG/ML (ref 15–150)
FOLATE SERPL-MCNC: >20 NG/ML
HCT VFR BLD AUTO: 26.7 % (ref 34–46.6)
HGB BLD-MCNC: 8.2 G/DL (ref 11.1–15.9)
IMM GRANULOCYTES NFR BLD AUTO: 0 %
IRON SATN MFR SERPL: 18 % (ref 15–55)
IRON SERPL-MCNC: 54 UG/DL (ref 27–139)
LYMPHOCYTES # BLD AUTO: 1.7 X10E3/UL (ref 0.7–3.1)
LYMPHOCYTES NFR BLD AUTO: 30 %
MCH RBC QN AUTO: 30.7 PG (ref 26.6–33)
MCHC RBC AUTO-ENTMCNC: 30.7 G/DL (ref 31.5–35.7)
MCV RBC AUTO: 100 FL (ref 79–97)
MONOCYTES # BLD AUTO: 0.5 X10E3/UL (ref 0.1–0.9)
MONOCYTES NFR BLD AUTO: 9 %
NEUTROPHILS # BLD AUTO: 3.3 X10E3/UL (ref 1.4–7)
NEUTROPHILS NFR BLD AUTO: 58 %
PATH REV BLD -IMP: ABNORMAL
PATHOLOGIST NAME: ABNORMAL
PLATELET # BLD AUTO: 181 X10E3/UL (ref 150–450)
RBC # BLD AUTO: 2.67 X10E6/UL (ref 3.77–5.28)
RETICS/RBC NFR AUTO: 1.8 % (ref 0.6–2.6)
TIBC SERPL-MCNC: 306 UG/DL (ref 250–450)
UIBC SERPL-MCNC: 252 UG/DL (ref 118–369)
VIT B12 SERPL-MCNC: 331 PG/ML (ref 232–1245)
WBC # BLD AUTO: 5.7 X10E3/UL (ref 3.4–10.8)

## 2023-12-19 ENCOUNTER — TELEPHONE (OUTPATIENT)
Dept: FAMILY MEDICINE | Facility: CLINIC | Age: 78
End: 2023-12-19
Payer: MEDICARE

## 2023-12-19 NOTE — TELEPHONE ENCOUNTER
----- Message from [facility] sent at 12/19/2023  1:17 PM CST -----  .Type:  Patient Returning Call    Who Called: pt    Who Left Message for Patient: Nevin's nurse    Does the patient know what this is regarding?: blood transfusion    Would the patient rather a call back? Yes    Best Call Back Number:[phone]    Additional Information:  pt says the doctor wants her to do a blood transfusion before [place]

## 2023-12-19 NOTE — TELEPHONE ENCOUNTER
"Spoke with pt  She stated that she is wanting to do the transfusion before [place]  Advised pt she will need to call Dr Wang's office  Pt stated that she did call them and spoke with someone who stated "they will give her the message"  I did advise pt to give them some time to get back with her   Pt expressed understanding  "

## 2023-12-19 NOTE — TELEPHONE ENCOUNTER
I spoke with Dr. Wang over phone this morning  Patients insurance has denied JOHN injections for anemia, Dr. Wang scheduled a court date this week to fight the insurance company about this but patient failed to fill out paper on time, then patient has missed follow up appointments, they  have also offered blood transfusions but patient has not done them either; I called patient over phone  Instructed her to call for a blood tranfusion and to set up a a follow up appointment to Dr Wang as there is a lot of miscommunication and this needs to be resolved. Patient is agreeable and expressed understanding.  Appt for tomorrow is to be cancelled and she can keep Jan 2024 appt date.

## 2024-01-05 ENCOUNTER — OFFICE VISIT (OUTPATIENT)
Dept: FAMILY MEDICINE | Facility: CLINIC | Age: 79
End: 2024-01-05
Payer: MEDICARE

## 2024-01-05 VITALS
RESPIRATION RATE: 16 BRPM | HEIGHT: 63 IN | OXYGEN SATURATION: 99 % | WEIGHT: 212.13 LBS | BODY MASS INDEX: 37.59 KG/M2 | DIASTOLIC BLOOD PRESSURE: 76 MMHG | SYSTOLIC BLOOD PRESSURE: 152 MMHG | HEART RATE: 76 BPM | TEMPERATURE: 98 F

## 2024-01-05 DIAGNOSIS — K21.9 GASTROESOPHAGEAL REFLUX DISEASE, UNSPECIFIED WHETHER ESOPHAGITIS PRESENT: ICD-10-CM

## 2024-01-05 DIAGNOSIS — E11.65 TYPE 2 DIABETES MELLITUS WITH HYPERGLYCEMIA, WITHOUT LONG-TERM CURRENT USE OF INSULIN: ICD-10-CM

## 2024-01-05 DIAGNOSIS — I73.9 PAD (PERIPHERAL ARTERY DISEASE): ICD-10-CM

## 2024-01-05 DIAGNOSIS — I15.2 HYPERTENSION ASSOCIATED WITH TYPE 2 DIABETES MELLITUS: ICD-10-CM

## 2024-01-05 DIAGNOSIS — D84.821 DRUG-INDUCED IMMUNODEFICIENCY: ICD-10-CM

## 2024-01-05 DIAGNOSIS — Z79.899 DRUG-INDUCED IMMUNODEFICIENCY: ICD-10-CM

## 2024-01-05 DIAGNOSIS — E11.59 HYPERTENSION ASSOCIATED WITH TYPE 2 DIABETES MELLITUS: ICD-10-CM

## 2024-01-05 DIAGNOSIS — D50.8 OTHER IRON DEFICIENCY ANEMIA: ICD-10-CM

## 2024-01-05 DIAGNOSIS — Z00.00 WELLNESS EXAMINATION: Primary | ICD-10-CM

## 2024-01-05 DIAGNOSIS — Z12.31 ENCOUNTER FOR SCREENING MAMMOGRAM FOR BREAST CANCER: ICD-10-CM

## 2024-01-05 DIAGNOSIS — M32.9 SYSTEMIC LUPUS ERYTHEMATOSUS, UNSPECIFIED SLE TYPE, UNSPECIFIED ORGAN INVOLVEMENT STATUS: ICD-10-CM

## 2024-01-05 DIAGNOSIS — R60.0 BILATERAL LOWER EXTREMITY EDEMA: ICD-10-CM

## 2024-01-05 DIAGNOSIS — E11.69 HYPERLIPIDEMIA ASSOCIATED WITH TYPE 2 DIABETES MELLITUS: ICD-10-CM

## 2024-01-05 DIAGNOSIS — E78.5 HYPERLIPIDEMIA ASSOCIATED WITH TYPE 2 DIABETES MELLITUS: ICD-10-CM

## 2024-01-05 DIAGNOSIS — E66.01 SEVERE OBESITY (BMI 35.0-39.9) WITH COMORBIDITY: ICD-10-CM

## 2024-01-05 PROCEDURE — 1159F MED LIST DOCD IN RCRD: CPT | Mod: CPTII,,, | Performed by: INTERNAL MEDICINE

## 2024-01-05 PROCEDURE — 1124F ACP DISCUSS-NO DSCNMKR DOCD: CPT | Mod: CPTII,,, | Performed by: INTERNAL MEDICINE

## 2024-01-05 PROCEDURE — 3288F FALL RISK ASSESSMENT DOCD: CPT | Mod: CPTII,,, | Performed by: INTERNAL MEDICINE

## 2024-01-05 PROCEDURE — G0439 PPPS, SUBSEQ VISIT: HCPCS | Mod: ,,, | Performed by: INTERNAL MEDICINE

## 2024-01-05 PROCEDURE — 3078F DIAST BP <80 MM HG: CPT | Mod: CPTII,,, | Performed by: INTERNAL MEDICINE

## 2024-01-05 PROCEDURE — 1101F PT FALLS ASSESS-DOCD LE1/YR: CPT | Mod: CPTII,,, | Performed by: INTERNAL MEDICINE

## 2024-01-05 PROCEDURE — 3077F SYST BP >= 140 MM HG: CPT | Mod: CPTII,,, | Performed by: INTERNAL MEDICINE

## 2024-01-05 PROCEDURE — 1160F RVW MEDS BY RX/DR IN RCRD: CPT | Mod: CPTII,,, | Performed by: INTERNAL MEDICINE

## 2024-01-05 RX ORDER — DULAGLUTIDE 1.5 MG/.5ML
1.5 INJECTION, SOLUTION SUBCUTANEOUS
Qty: 4 PEN | Refills: 11 | Status: SHIPPED | OUTPATIENT
Start: 2024-01-05 | End: 2025-01-04

## 2024-01-05 NOTE — PROGRESS NOTES
Patient ID: [id number]     Chief Complaint: No chief complaint on file.      HPI:     Edyta Rubio is a 78 y.o. female here today for a Medicare Wellness.      HTN:  She remained on losartan and HCTZ  BP high today  Says BP was normal but now high for last 1 week  Did eat more salt/food over holidays  No BP log to review      DM:  No longer on metformin secondary to diarrhea  Actos increased to 30 mg po daily  Trulicity at 0.75 mgsubQ weekly  -110  Patient reports struggling to eat less, has big appetite   Foot exam no DN 08/28/2023   Eye exam: in office 08/28/2023     SLE, Drug induced immunodeficiency:  Compliant with plaquenil through Rheumatology  Stable     HLD: on statin therapy; no myalgias    GERD: on PPI; sx controlled        ANEMIA worse; s/p EGD and colonoscopy, hematology Dr. Maru Jenkins following; DAMIAN and B12 deficiency. on supplement for both Says that she felt sick from iron supplement and was not taking daily; H/H  improved with liquid iron-  -patient has now seen hematology at [facility]  -does not tolerate oral iron  -insurance denied IV iron   -patient received blood transfusion for anemia at [facility]  -she was also scheduled for court date to discuss IV iron therapy treatment with DR. Wang to her insurance but this was not yet done  -she is scheduled for f/u next week       Osteoporosis: diagnosed 2023, advised for Boniva but waiting for bone marrow biopsy results to be completed            MRI lumbar spine 3/21 with OA and disc bulging lumbar/sacral area  Patient with persistent lower back and thigh pain  never saw any specialists to f/u her MRI lumbar spine and pain issues  She says she knows we referred her to Dr. Ferguson to evaluate and she say she wasn't ready to go through with this  She also declines PT and neurosurgery evaluation     Mammogram 04/28/2023 negative for malignancy  Ordered   DEXA 2023 with osteoporosis  COVID 19 vaccine: Moderna x 3 doses  ADL: complete and  intact  colonoscopy: 2014 with 1 polyp; 6/2019 with polyps Dr. Thomas flu shot, declines  HCPOA: spouse  Hearing: passes whisper test at 5 feet   stress done a few years ago; does not see a cardiologist  prevnar 1/23/19  pneumovax 8/31/2020  shingrix vaccine: due- will work on at next visit with Rx    Opioid Screening: Patient medication list reviewed, patient is not taking prescription opioids. Patient is not using additional opioids than prescribed. Patient is at low risk of substance abuse based on this opioid use history.       ----------------------------  Alopecia  Diabetic peripheral neuropathy  DM (diabetes mellitus)  GERD (gastroesophageal reflux disease)  HTN (hypertension)  Hypercholesterolemia  DAMIAN (iron deficiency anemia)  Memory impairment  Osteopenia  PVD (peripheral vascular disease)  Unspecified osteoarthritis, unspecified site  Vitamin B12 deficiency     Past Surgical History:   Procedure Laterality Date    EYE SURGERY      HYSTERECTOMY      MOLE REMOVAL      chest       Review of patient's allergies indicates:   Allergen Reactions    Celecoxib      Other reaction(s): GI Upset    Ibuprofen      Other reaction(s): Psychosis    Lisinopril      Other reaction(s): cough    Tramadol Nausea And Vomiting       Outpatient Medications Marked as Taking for the 1/5/24 encounter (Office Visit) with Darline Rooney MD   Medication Sig Dispense Refill    alcohol swabs (DROPSAFE ALCOHOL PREP PADS) PadM USE  TOPICALLY 3  TIMES DAILY. 100 each 11    APPLE CIDER VINEGAR ORAL Take 1 Dose by mouth every morning.      aspirin 81 MG Chew Take 81 mg by mouth once daily.      atorvastatin (LIPITOR) 40 MG tablet TAKE 1 TABLET AT BEDTIME 90 tablet 3    blood sugar diagnostic (ACCU-CHEK GUIDE TEST STRIPS) Strp TEST BLOOD SUGAR THREE TIMES DAILY 300 strip 3    blood-glucose meter (ACCU-CHEK HANSEL PLUS METER) Misc 1 each by Misc.(Non-Drug; Combo Route) route once daily. 1 each 0    hydroCHLOROthiazide (MICROZIDE) 12.5 mg  capsule TAKE 1 CAPSULE BY MOUTH EVERY DAY 90 capsule 4    hydrOXYchloroQUINE (PLAQUENIL) 200 mg tablet TAKE 1 TABLET BY MOUTH TWICE A DAY WITH FOOD OR MILK 180 tablet 3    lancets (ACCU-CHEK SOFTCLIX LANCETS) Misc TEST BLOOD SUGAR THREE TIMES DAILY 300 each 11    losartan (COZAAR) 50 MG tablet Take 50 mg by mouth 2 (two) times a day.      omeprazole (PRILOSEC) 40 MG capsule Take 1 capsule (40 mg total) by mouth every morning. 90 capsule 3    pioglitazone (ACTOS) 30 MG tablet Take 1 tablet (30 mg total) by mouth once daily. 90 tablet 3    RESTASIS 0.05 % ophthalmic emulsion Place 1 drop into both eyes 2 (two) times daily.      tiZANidine (ZANAFLEX) 4 MG tablet Take 1 tablet (4 mg total) by mouth nightly. 90 tablet 3    [DISCONTINUED] dulaglutide (TRULICITY) 0.75 mg/0.5 mL pen injector Inject 0.75 mg into the skin every 7 days. 4 pen 11    [DISCONTINUED] ferrous sulfate 220 mg (44 mg iron)/5 mL solution Take 220 mg by mouth once daily.         Social History     Socioeconomic History    Marital status:    Tobacco Use    Smoking status: Former     Current packs/day: 0.00     Types: Cigarettes     Quit date:      Years since quittin.0    Smokeless tobacco: Never   Substance and Sexual Activity    Alcohol use: Not Currently    Drug use: Not Currently    Sexual activity: Not Currently        History reviewed. No pertinent family history.     Patient Care Team:  Darline Rooney MD as PCP - General (Internal Medicine)       Subjective:     Review of Systems   Musculoskeletal:  Positive for back pain and joint pain.         Patient Reported Health Risk Assessment  What [age] 70-79  Are you male or female?: Female  During the past four weeks, how much have you been bothered by emotional problems such as feeling anxious, depressed, irritable, sad, or downhearted and blue?: Slightly  During the past five weeks, has your physical and/or emotional health limited your social activities with family,  friends, neighbors, or groups?: Slightly  During the past four weeks, how much bodily pain have you generally had?: Mild pain  During the past four weeks, was someone available to help if you needed and wanted help?: Yes, as much as I wanted  During the past four weeks, what was the hardest physical activity you could do for at least two minutes?: Light  Can you get to places out of walking distance without help?  (For example, can you travel alone on buses or taxis, or drive your own car?): Yes  Can you go shopping for groceries or clothes without someone's help?: Yes  Can you prepare your own meals?: Yes  Can you do your own housework without help?: Yes  Because of any health problems, do you need the help of another person with your personal care needs such as eating, bathing, dressing, or getting around the house?: No  Can you handle your own money without help?: Yes  During the past four weeks, how would you rate your health in general?: Good  How have things been going for you during the past four weeks?: Pretty well  Are you having difficulties driving your car?: No  Do you always fasten your seat belt when you are in a car?: Yes, usually  How often in the past four weeks have you been bothered by falling or dizzy when standing up?: Seldom  How often in the past four weeks have you been bothered by sexual problems?: Never  How often in the past four weeks have you been bothered by trouble eating well?: Never  How often in the past four weeks have you been bothered by teeth or denture problems?: Never  How often in the past four weeks have you been bothered with problems using the telephone?: Never  How often in the past four weeks have you been bothered by tiredness or fatigue?: Sometimes  Have you fallen two or more times in the past year?: No  Are you afraid of falling?: Yes  Are you a smoker?: No  During the past four weeks, how many drinks of wine, beer, or other alcoholic beverages did you have?: No  "alcohol at all  Do you exercise for about 20 minutes three or more days a week?: No, I usually do not exercise this much  Have you been given any information to help you with hazards in your house that might hurt you?: Yes  Have you been given any information to help you with keeping track of your medications?: Yes  How often do you have trouble taking medicines the way you've been told to take them?: I always take them as prescribed  How confident are you that you can control and manage most of your health problems?: Very confident  What is your race? (Check all that apply.):     Objective:     BP (!) 152/76 (BP Location: Right arm, Patient Position: Sitting, BP Method: Large (Manual))   Pulse 76   Temp 97.8 °F (36.6 °C) (Temporal)   Resp 16   Ht 5' 3" (1.6 m)   Wt 96.2 kg (212 lb 1.6 oz)   SpO2 99%   BMI 37.57 kg/m²     Physical Exam  Vitals and nursing note reviewed.   Constitutional:       General: She is not in acute distress.     Appearance: Normal appearance. She is not ill-appearing.   HENT:      Head: Normocephalic and atraumatic.   Cardiovascular:      Rate and Rhythm: Normal rate and regular rhythm.   Pulmonary:      Effort: Pulmonary effort is normal.      Breath sounds: Normal breath sounds. No wheezing.   Abdominal:      General: There is no distension.      Palpations: Abdomen is soft.      Tenderness: There is no abdominal tenderness. There is no guarding.   Musculoskeletal:      Right lower leg: Edema present.      Left lower leg: Edema present.   Neurological:      Mental Status: She is alert.                No data to display                  1/5/2024    10:00 AM 8/28/2023     9:00 AM 7/19/2023     1:00 PM 7/14/2023    11:30 AM 7/11/2023     8:30 AM 5/25/2023    11:45 AM 5/1/2023     8:30 AM   Fall Risk Assessment - Outpatient   Mobility Status Ambulatory Ambulatory Ambulatory Ambulatory Ambulatory Ambulatory Ambulatory   Number of falls 0 0 0 0 0 0 1   Identified as fall " risk False False False False False False False           Depression Screening  Over the past two weeks, has the patient felt down, depressed, or hopeless?: No  Over the past two weeks, has the patient felt little interest or pleasure in doing things?: No  Functional Ability/Safety Screening  Was the patient's timed Up & Go test unsteady or longer than 30 seconds?: No  Does the patient need help with phone, transportation, shopping, preparing meals, housework, laundry, meds, or managing money?: No  Does the patient's home have rugs in the hallway, lack grab bars in the bathroom, lack handrails on the stairs or have poor lighting?: No  Have you noticed any hearing difficulties?: No  Cognitive Function (Assessed through direct observation with due consideration of information obtained by way of patient reports and/or concerns raised by family, friends, caretakers, or others)    Does the patient repeat questions/statements in the same day?: No  Does the patient have trouble remembering the date, year, and time?: No  Does the patient have difficulty managing finances?: No  Does the patient have a decreased sense of direction?: No  Assessment/Plan:     1. Wellness examination  Fasting labs ordered for March 2023  Last set of labs did show some renal insufficiency  Drink more water  Avoid NSAID use    2. Encounter for screening mammogram for breast cancer  -     Mammo Digital Screening Bilat w/ [facility]; Future; Expected date: 04/28/2024    3. Severe obesity (BMI 35.0-39.9) with comorbidity  BMI reviewed  She is struggling to control appetite  Increase trulicity to 1.5 mg subQ weekly  Work on reduced carb/sugar intake, portion control  4. Hyperlipidemia associated with type 2 diabetes mellitus  Stable continue statin therapy, LDL at goal  5. Systemic lupus erythematosus, unspecified SLE type, unspecified organ involvement status  Stable sx are well controlled  Continue plaquenil  F/u with rheumatology  6. Drug-induced  immunodeficiency  Stable   Continue plaquenil  CBC followed by hematology  7. PAD (peripheral artery disease)  Stable continue aspirin and statin therapy and low fat diet  8. Hypertension associated with type 2 diabetes mellitus  Unstable  BP elevated  Continue losartan and HCTZ  Repeat BP in 2 weeks, bring home BP log  Pending results will adjust medication  9. Type 2 diabetes mellitus with hyperglycemia, without long-term current use of insulin  -     Comprehensive Metabolic Panel; Future; Expected date: 03/05/2024  -     Hemoglobin A1C; Future; Expected date: 03/05/2024  Stable  Increase trulicity to 1.5 mg SubQ weekly  Continue actos    10. Gastroesophageal reflux disease, unspecified whether esophagitis present  Stable continue PPI  11. Bilateral lower extremity edema  Stable unchanged  Please wear compression hose  12. Other iron deficiency anemia    Other orders  -     dulaglutide (TRULICITY) 1.5 mg/0.5 mL pen injector; Inject 1.5 mg into the skin every 7 days.  Dispense: 4 pen ; Refill: 11           Medicare Annual Wellness and Personalized Prevention Plan:   Fall Risk + Home Safety + Hearing Impairment + Depression Screen + Opioid and Substance Abuse Screening + Cognitive Impairment Screen + Health Risk Assessment all reviewed.     Health Maintenance Topics with due status: Not Due       Topic Last Completion Date    Mammogram 04/28/2023    DEXA Scan 05/12/2023    Eye Exam 08/28/2023    Diabetes Urine Screening 12/13/2023    Lipid Panel 12/13/2023    Hemoglobin A1c 12/13/2023      The patient's Health Maintenance was reviewed and the following appears to be due at this time:   Health Maintenance Due   Topic Date Due    Hepatitis C Screening  Never done    COVID-19 Vaccine (1) Never done    TETANUS VACCINE  Never done    Shingles Vaccine (1 of 2) Never done    RSV Vaccine (Age 60+ and Pregnant patients) (1 - 1-dose 60+ series) Never done    Influenza Vaccine (1) Never done       Advance Care Planning      Date: 01/05/2024  Patient did not wish or was not able to name a surrogate decision maker or provide an Advance Care Plan.         Follow up in about 3 months (around 4/5/2024) for Follow Up - Chronic Conditions labs due before fasting . In addition to their scheduled follow up, the patient has also been instructed to follow up on as needed basis.

## 2024-01-19 ENCOUNTER — OFFICE VISIT (OUTPATIENT)
Dept: FAMILY MEDICINE | Facility: CLINIC | Age: 79
End: 2024-01-19
Payer: MEDICARE

## 2024-01-19 VITALS
TEMPERATURE: 98 F | DIASTOLIC BLOOD PRESSURE: 56 MMHG | HEIGHT: 66 IN | RESPIRATION RATE: 20 BRPM | WEIGHT: 212.13 LBS | OXYGEN SATURATION: 94 % | HEART RATE: 82 BPM | SYSTOLIC BLOOD PRESSURE: 132 MMHG | BODY MASS INDEX: 34.09 KG/M2

## 2024-01-19 DIAGNOSIS — I10 PRIMARY HYPERTENSION: Primary | ICD-10-CM

## 2024-01-19 PROCEDURE — 99213 OFFICE O/P EST LOW 20 MIN: CPT | Mod: ,,, | Performed by: NURSE PRACTITIONER

## 2024-01-19 PROCEDURE — 3075F SYST BP GE 130 - 139MM HG: CPT | Mod: CPTII,,, | Performed by: NURSE PRACTITIONER

## 2024-01-19 PROCEDURE — 3078F DIAST BP <80 MM HG: CPT | Mod: CPTII,,, | Performed by: NURSE PRACTITIONER

## 2024-01-19 PROCEDURE — 1159F MED LIST DOCD IN RCRD: CPT | Mod: CPTII,,, | Performed by: NURSE PRACTITIONER

## 2024-01-19 PROCEDURE — 1126F AMNT PAIN NOTED NONE PRSNT: CPT | Mod: CPTII,,, | Performed by: NURSE PRACTITIONER

## 2024-01-19 PROCEDURE — 3288F FALL RISK ASSESSMENT DOCD: CPT | Mod: CPTII,,, | Performed by: NURSE PRACTITIONER

## 2024-01-19 PROCEDURE — 1101F PT FALLS ASSESS-DOCD LE1/YR: CPT | Mod: CPTII,,, | Performed by: NURSE PRACTITIONER

## 2024-01-19 PROCEDURE — 1160F RVW MEDS BY RX/DR IN RCRD: CPT | Mod: CPTII,,, | Performed by: NURSE PRACTITIONER

## 2024-01-19 RX ORDER — HYDROCHLOROTHIAZIDE 25 MG/1
12.5 TABLET ORAL DAILY
COMMUNITY
Start: 2024-01-18

## 2024-01-19 NOTE — ASSESSMENT & PLAN NOTE
BP at goal; Stable  Med management:  Continue current medication (Currently taking Losartan 50 mg po daily and HCTZ 12.5 mg po daily)   Daily BP check; bring log all clinic visits  Instructed to report to ED for any BP greater than 200/100, dizziness, syncope, CP, or SOB  Keep appt. With PCP for follow up  Patient is agreeable to plan and verbalizes understanding

## 2024-01-19 NOTE — PROGRESS NOTES
Subjective:      Patient ID: Edyta Rubio is a 78 y.o. Black or  female      Chief Complaint: Hypertension       Past Medical History:   Diagnosis Date    Alopecia     Diabetic peripheral neuropathy     DM (diabetes mellitus)     GERD (gastroesophageal reflux disease)     HTN (hypertension)     Hypercholesterolemia     DAMIAN (iron deficiency anemia)     Memory impairment     Osteopenia     PVD (peripheral vascular disease)     Unspecified osteoarthritis, unspecified site     Vitamin B12 deficiency         HPI  Presents today for re-evaluation of BP.      HTN:  BP elevated at previous visit.  Currently taking Losartan 50 mg po daily and HCTZ 25 mg po daily.  Of note, HCTZ recently increased by Dr Mena.  States compliance with medications.  Denies any headaches, dizziness, syncope, CP, or SOB.  Denies any other problems.            Patient Care Team:  Darline Rooney MD as PCP - General (Internal Medicine)      Review of Systems   Constitutional: Negative.  Negative for appetite change, chills and fever.   HENT: Negative.     Eyes: Negative.  Negative for discharge and redness.   Respiratory: Negative.  Negative for shortness of breath.    Cardiovascular: Negative.  Negative for chest pain.   Gastrointestinal: Negative.    Endocrine: Negative.    Genitourinary: Negative.    Integumentary:  Negative.   Allergic/Immunologic: Negative.    Neurological: Negative.    Psychiatric/Behavioral: Negative.     All other systems reviewed and are negative.          Objective:      Vitals:    01/19/24 0900   BP: (!) 132/56   Pulse: 82   Resp: 20   Temp: 97.6 °F (36.4 °C)      Body mass index is 34.23 kg/m².     Physical Exam  Vitals reviewed.   Constitutional:       Appearance: Normal appearance.   HENT:      Head: Normocephalic.      Mouth/Throat:      Mouth: Mucous membranes are moist.   Eyes:      Conjunctiva/sclera: Conjunctivae normal.      Pupils: Pupils are equal, round, and reactive to light.    Cardiovascular:      Rate and Rhythm: Normal rate and regular rhythm.      Heart sounds: Murmur heard.   Pulmonary:      Effort: Pulmonary effort is normal. No respiratory distress.      Breath sounds: Normal breath sounds.   Musculoskeletal:         General: Normal range of motion.      Cervical back: Normal range of motion and neck supple.   Skin:     General: Skin is warm and dry.   Neurological:      Mental Status: She is alert and oriented to person, place, and time.   Psychiatric:         Mood and Affect: Mood normal.            Current Outpatient Medications:     alcohol swabs (DROPSAFE ALCOHOL PREP PADS) PadM, USE  TOPICALLY 3  TIMES DAILY., Disp: 100 each, Rfl: 11    APPLE CIDER VINEGAR ORAL, Take 1 Dose by mouth every morning., Disp: , Rfl:     aspirin 81 MG Chew, Take 81 mg by mouth once daily., Disp: , Rfl:     atorvastatin (LIPITOR) 40 MG tablet, TAKE 1 TABLET AT BEDTIME, Disp: 90 tablet, Rfl: 3    blood sugar diagnostic (ACCU-CHEK GUIDE TEST STRIPS) Strp, TEST BLOOD SUGAR THREE TIMES DAILY, Disp: 300 strip, Rfl: 3    blood-glucose meter (ACCU-CHEK HANSEL PLUS METER) Misc, 1 each by Misc.(Non-Drug; Combo Route) route once daily., Disp: 1 each, Rfl: 0    dulaglutide (TRULICITY) 1.5 mg/0.5 mL pen injector, Inject 1.5 mg into the skin every 7 days., Disp: 4 pen , Rfl: 11    hydroCHLOROthiazide (HYDRODIURIL) 25 MG tablet, Take 25 mg by mouth., Disp: , Rfl:     hydrOXYchloroQUINE (PLAQUENIL) 200 mg tablet, TAKE 1 TABLET BY MOUTH TWICE A DAY WITH FOOD OR MILK, Disp: 180 tablet, Rfl: 3    lancets (ACCU-CHEK SOFTCLIX LANCETS) Misc, TEST BLOOD SUGAR THREE TIMES DAILY, Disp: 300 each, Rfl: 11    losartan (COZAAR) 50 MG tablet, Take 50 mg by mouth 2 (two) times a day., Disp: , Rfl:     omeprazole (PRILOSEC) 40 MG capsule, Take 1 capsule (40 mg total) by mouth every morning., Disp: 90 capsule, Rfl: 3    pioglitazone (ACTOS) 30 MG tablet, Take 1 tablet (30 mg total) by mouth once daily., Disp: 90 tablet, Rfl: 3     RESTASIS 0.05 % ophthalmic emulsion, Place 1 drop into both eyes 2 (two) times daily., Disp: , Rfl:     tiZANidine (ZANAFLEX) 4 MG tablet, Take 1 tablet (4 mg total) by mouth nightly., Disp: 90 tablet, Rfl: 3    Assessment & Plan:     Problem List Items Addressed This Visit          Cardiac/Vascular    Primary hypertension - Primary     BP at goal; Stable  Med management:  Continue current medication (Currently taking Losartan 50 mg po daily and HCTZ 12.5 mg po daily)   Daily BP check; bring log all clinic visits  Instructed to report to ED for any BP greater than 200/100, dizziness, syncope, CP, or SOB  Keep appt. With PCP for follow up  Patient is agreeable to plan and verbalizes understanding

## 2024-03-04 DIAGNOSIS — I15.2 HYPERTENSION ASSOCIATED WITH TYPE 2 DIABETES MELLITUS: Primary | ICD-10-CM

## 2024-03-04 DIAGNOSIS — E11.59 HYPERTENSION ASSOCIATED WITH TYPE 2 DIABETES MELLITUS: Primary | ICD-10-CM

## 2024-03-04 RX ORDER — LOSARTAN POTASSIUM 50 MG/1
50 TABLET ORAL 2 TIMES DAILY
Qty: 180 TABLET | Refills: 3 | Status: SHIPPED | OUTPATIENT
Start: 2024-03-04

## 2024-03-22 ENCOUNTER — TELEPHONE (OUTPATIENT)
Dept: FAMILY MEDICINE | Facility: CLINIC | Age: 79
End: 2024-03-22
Payer: MEDICARE

## 2024-03-22 DIAGNOSIS — M32.9 SYSTEMIC LUPUS ERYTHEMATOSUS, UNSPECIFIED SLE TYPE, UNSPECIFIED ORGAN INVOLVEMENT STATUS: ICD-10-CM

## 2024-03-22 DIAGNOSIS — D64.9 ANEMIA, UNSPECIFIED TYPE: ICD-10-CM

## 2024-03-22 DIAGNOSIS — E11.69 HYPERLIPIDEMIA ASSOCIATED WITH TYPE 2 DIABETES MELLITUS: Primary | ICD-10-CM

## 2024-03-22 DIAGNOSIS — E78.5 HYPERLIPIDEMIA ASSOCIATED WITH TYPE 2 DIABETES MELLITUS: Primary | ICD-10-CM

## 2024-03-22 DIAGNOSIS — D84.821 DRUG-INDUCED IMMUNODEFICIENCY: ICD-10-CM

## 2024-03-22 DIAGNOSIS — I10 PRIMARY HYPERTENSION: ICD-10-CM

## 2024-03-22 DIAGNOSIS — E11.65 TYPE 2 DIABETES MELLITUS WITH HYPERGLYCEMIA, WITHOUT LONG-TERM CURRENT USE OF INSULIN: ICD-10-CM

## 2024-03-22 DIAGNOSIS — Z79.899 DRUG-INDUCED IMMUNODEFICIENCY: ICD-10-CM

## 2024-03-22 NOTE — TELEPHONE ENCOUNTER
----- Message from Eileen Llamas sent at 3/22/2024  8:29 AM CDT -----  Regarding: medical advice    Type:  Needs Medical Advice    Who Called:  pt      Would the patient rather a call back or a response via [facility]?  Call back    Best Call Back Number:  [phone]    Additional Information:  pt called stating she will stop in the office on 3/25/24 to  her orders for [facility] to take with her as usual, please be advised, thanks

## 2024-03-25 ENCOUNTER — TELEPHONE (OUTPATIENT)
Dept: FAMILY MEDICINE | Facility: CLINIC | Age: 79
End: 2024-03-25
Payer: MEDICARE

## 2024-03-25 NOTE — TELEPHONE ENCOUNTER
----- Message from Tanja Arnold sent at 3/25/2024 10:50 AM CDT -----  .Type:  Needs Medical Advice    Who Called: pt  Symptoms (please be specific):    How long has patient had these symptoms:    Pharmacy name and phone #:    Would the patient rather a call back or a response via [facility]?   Best Call Back Number: [phone]  Additional Information: pt asking if her lab orders at  so she can pickl up please advice

## 2024-03-27 DIAGNOSIS — E11.65 TYPE 2 DIABETES MELLITUS WITH HYPERGLYCEMIA, WITHOUT LONG-TERM CURRENT USE OF INSULIN: ICD-10-CM

## 2024-03-27 DIAGNOSIS — E11.65 TYPE 2 DIABETES MELLITUS WITH HYPERGLYCEMIA, UNSPECIFIED WHETHER LONG TERM INSULIN USE: ICD-10-CM

## 2024-03-27 LAB
ALBUMIN SERPL-MCNC: 4.1 G/DL (ref 3.8–4.8)
ALBUMIN/GLOB SERPL: 1.4 {RATIO} (ref 1.2–2.2)
ALP SERPL-CCNC: 87 IU/L (ref 44–121)
ALT SERPL-CCNC: 9 IU/L (ref 0–32)
AST SERPL-CCNC: 14 IU/L (ref 0–40)
BASOPHILS # BLD AUTO: 0 X10E3/UL (ref 0–0.2)
BASOPHILS NFR BLD AUTO: 1 %
BILIRUB SERPL-MCNC: 0.3 MG/DL (ref 0–1.2)
BUN SERPL-MCNC: 31 MG/DL (ref 8–27)
BUN/CREAT SERPL: 27 (ref 12–28)
CALCIUM SERPL-MCNC: 9.7 MG/DL (ref 8.7–10.3)
CHLORIDE SERPL-SCNC: 97 MMOL/L (ref 96–106)
CHOLEST SERPL-MCNC: 120 MG/DL (ref 100–199)
CO2 SERPL-SCNC: 25 MMOL/L (ref 20–29)
CREAT SERPL-MCNC: 1.14 MG/DL (ref 0.57–1)
EOSINOPHIL # BLD AUTO: 0.1 X10E3/UL (ref 0–0.4)
EOSINOPHIL NFR BLD AUTO: 2 %
ERYTHROCYTE [DISTWIDTH] IN BLOOD BY AUTOMATED COUNT: 14.2 % (ref 11.7–15.4)
EST. GFR  (NO RACE VARIABLE): 50 ML/MIN/1.73
GLOBULIN SER CALC-MCNC: 2.9 G/DL (ref 1.5–4.5)
GLUCOSE SERPL-MCNC: 105 MG/DL (ref 70–99)
HBA1C MFR BLD: 5.9 % (ref 4.8–5.6)
HCT VFR BLD AUTO: 32.4 % (ref 34–46.6)
HDLC SERPL-MCNC: 68 MG/DL
HGB BLD-MCNC: 10.2 G/DL (ref 11.1–15.9)
IMM GRANULOCYTES NFR BLD AUTO: 0 %
LDLC SERPL CALC-MCNC: 43 MG/DL (ref 0–99)
LYMPHOCYTES # BLD AUTO: 1.5 X10E3/UL (ref 0.7–3.1)
LYMPHOCYTES NFR BLD AUTO: 26 %
MCH RBC QN AUTO: 31.2 PG (ref 26.6–33)
MCHC RBC AUTO-ENTMCNC: 31.5 G/DL (ref 31.5–35.7)
MCV RBC AUTO: 99 FL (ref 79–97)
MONOCYTES # BLD AUTO: 0.4 X10E3/UL (ref 0.1–0.9)
MONOCYTES NFR BLD AUTO: 6 %
NEUTROPHILS # BLD AUTO: 3.8 X10E3/UL (ref 1.4–7)
NEUTROPHILS NFR BLD AUTO: 65 %
PLATELET # BLD AUTO: 169 X10E3/UL (ref 150–450)
POTASSIUM SERPL-SCNC: 4.1 MMOL/L (ref 3.5–5.2)
PROT SERPL-MCNC: 7 G/DL (ref 6–8.5)
RBC # BLD AUTO: 3.27 X10E6/UL (ref 3.77–5.28)
SODIUM SERPL-SCNC: 133 MMOL/L (ref 134–144)
SPECIMEN STATUS REPORT: NORMAL
TRIGL SERPL-MCNC: 28 MG/DL (ref 0–149)
VLDLC SERPL CALC-MCNC: 9 MG/DL (ref 5–40)
WBC # BLD AUTO: 5.7 X10E3/UL (ref 3.4–10.8)

## 2024-03-27 RX ORDER — LANCETS
EACH MISCELLANEOUS 3 TIMES DAILY
Qty: 300 EACH | Refills: 3 | Status: SHIPPED | OUTPATIENT
Start: 2024-03-27 | End: 2024-04-11

## 2024-04-05 ENCOUNTER — DOCUMENTATION ONLY (OUTPATIENT)
Dept: RHEUMATOLOGY | Facility: CLINIC | Age: 79
End: 2024-04-05
Payer: MEDICARE

## 2024-04-05 NOTE — PROGRESS NOTES
Certified letter [id number] mailed to patient in regard to Rheumatology provider  from organization/ medication refills. Letter returned due to Edyta Rubio incorrect address, etc.

## 2024-04-11 ENCOUNTER — OFFICE VISIT (OUTPATIENT)
Dept: FAMILY MEDICINE | Facility: CLINIC | Age: 79
End: 2024-04-11
Payer: MEDICARE

## 2024-04-11 ENCOUNTER — TELEPHONE (OUTPATIENT)
Dept: FAMILY MEDICINE | Facility: CLINIC | Age: 79
End: 2024-04-11

## 2024-04-11 VITALS
BODY MASS INDEX: 35.25 KG/M2 | OXYGEN SATURATION: 99 % | TEMPERATURE: 98 F | HEART RATE: 68 BPM | DIASTOLIC BLOOD PRESSURE: 70 MMHG | RESPIRATION RATE: 16 BRPM | WEIGHT: 219.31 LBS | SYSTOLIC BLOOD PRESSURE: 136 MMHG | HEIGHT: 66 IN

## 2024-04-11 DIAGNOSIS — I15.2 HYPERTENSION ASSOCIATED WITH TYPE 2 DIABETES MELLITUS: ICD-10-CM

## 2024-04-11 DIAGNOSIS — M32.9 SYSTEMIC LUPUS ERYTHEMATOSUS, UNSPECIFIED SLE TYPE, UNSPECIFIED ORGAN INVOLVEMENT STATUS: ICD-10-CM

## 2024-04-11 DIAGNOSIS — E11.65 TYPE 2 DIABETES MELLITUS WITH HYPERGLYCEMIA, WITHOUT LONG-TERM CURRENT USE OF INSULIN: ICD-10-CM

## 2024-04-11 DIAGNOSIS — N18.31 CHRONIC KIDNEY DISEASE, STAGE 3A: ICD-10-CM

## 2024-04-11 DIAGNOSIS — E11.65 TYPE 2 DIABETES MELLITUS WITH HYPERGLYCEMIA, WITHOUT LONG-TERM CURRENT USE OF INSULIN: Primary | ICD-10-CM

## 2024-04-11 DIAGNOSIS — N17.9 ACUTE KIDNEY INJURY: ICD-10-CM

## 2024-04-11 DIAGNOSIS — E11.59 HYPERTENSION ASSOCIATED WITH TYPE 2 DIABETES MELLITUS: ICD-10-CM

## 2024-04-11 PROBLEM — M79.652 PAIN OF LEFT THIGH: Status: RESOLVED | Noted: 2022-11-01 | Resolved: 2024-04-11

## 2024-04-11 PROCEDURE — 1101F PT FALLS ASSESS-DOCD LE1/YR: CPT | Mod: CPTII,,, | Performed by: INTERNAL MEDICINE

## 2024-04-11 PROCEDURE — 3288F FALL RISK ASSESSMENT DOCD: CPT | Mod: CPTII,,, | Performed by: INTERNAL MEDICINE

## 2024-04-11 PROCEDURE — 99214 OFFICE O/P EST MOD 30 MIN: CPT | Mod: ,,, | Performed by: INTERNAL MEDICINE

## 2024-04-11 PROCEDURE — 1159F MED LIST DOCD IN RCRD: CPT | Mod: CPTII,,, | Performed by: INTERNAL MEDICINE

## 2024-04-11 PROCEDURE — 3078F DIAST BP <80 MM HG: CPT | Mod: CPTII,,, | Performed by: INTERNAL MEDICINE

## 2024-04-11 PROCEDURE — 1160F RVW MEDS BY RX/DR IN RCRD: CPT | Mod: CPTII,,, | Performed by: INTERNAL MEDICINE

## 2024-04-11 PROCEDURE — 3075F SYST BP GE 130 - 139MM HG: CPT | Mod: CPTII,,, | Performed by: INTERNAL MEDICINE

## 2024-04-11 RX ORDER — FERROUS SULFATE 325(65) MG
325 TABLET ORAL 2 TIMES DAILY
COMMUNITY

## 2024-04-11 RX ORDER — MULTIVITAMIN
1 TABLET ORAL DAILY
COMMUNITY

## 2024-04-11 RX ORDER — MAGNESIUM 250 MG
500 TABLET ORAL ONCE
COMMUNITY

## 2024-04-11 RX ORDER — SEMAGLUTIDE 0.68 MG/ML
0.5 INJECTION, SOLUTION SUBCUTANEOUS
Qty: 3 ML | Refills: 11 | Status: SHIPPED | OUTPATIENT
Start: 2024-04-11 | End: 2024-05-16

## 2024-04-11 RX ORDER — SEMAGLUTIDE 0.68 MG/ML
0.5 INJECTION, SOLUTION SUBCUTANEOUS
Qty: 3 ML | Refills: 11 | Status: SHIPPED | OUTPATIENT
Start: 2024-04-11 | End: 2024-04-11 | Stop reason: SDUPTHER

## 2024-04-11 NOTE — TELEPHONE ENCOUNTER
Patient told me today she has been unable to get trulicity for past 1 month  I sent in ozempic today  Please call pharmacy to see if they have it in stock  If yes, then please notify patient we will switch from trulicity to ozempic as they do act the same and can help her with sugar control  If they do not, please let me know as I will need to try an oral medication option for her  thanks

## 2024-04-11 NOTE — PROGRESS NOTES
Subjective:      Patient ID: Edyta Rubio is a 78 y.o. female.    Chief Complaint: Diabetes    HPI  Follow up visit  Last wellness 01/05/2024  Patient is upset/frustrated today, will not say too much about this but difficult to get much out of her today     HTN:  Bp stable today      DM:  No longer on metformin secondary to diarrhea  Actos increased to 30 mg po daily  Unable to fill Rx for trulicity- not in stock  FBG 110s-120s  Patient reports struggling to eat less, has big appetite   Foot exam no DN 08/28/2023   Eye exam: in office 08/28/2023     SLE, Drug induced immunodeficiency:  Compliant with plaquenil through Rheumatology  Stable  No longer seen by Dr. Owens, needs new rheum      HLD: on statin therapy; no myalgias    GERD: on PPI; sx controlled        ANEMIA worse; s/p EGD and colonoscopy, hematology Dr. Maru Jenkins following; DAMIAN and B12 deficiency. on supplement for both Says that she felt sick from iron supplement and was not taking daily; H/H  improved with liquid iron-  -patient has now seen hematology at [facility]  -does not tolerate oral iron  -insurance denied IV iron   -patient received blood transfusion for anemia at [facility]  -she was also scheduled for court date to discuss IV iron therapy treatment with DR. Wang to her insurance but this was not yet done  -she has done injections now, she reports no improvement and also that cost is too high, very upset about this         Osteoporosis: diagnosed 2023, advised for Boniva but waiting for bone marrow biopsy results to be completed            MRI lumbar spine 3/21 with OA and disc bulging lumbar/sacral area  Patient with persistent lower back and thigh pain  never saw any specialists to f/u her MRI lumbar spine and pain issues  She says she knows we referred her to Dr. Ferguson to evaluate and she say she wasn't ready to go through with this  She also declines PT and neurosurgery evaluation     Mammogram 04/28/2023 negative for malignancy  Ordered  "  DEXA 2023 with osteoporosis  COVID 19 vaccine: Moderna x 3 doses  ADL: complete and intact  colonoscopy: 2014 with 1 polyp; 6/2019 with polyps Dr. Thomas flu shot, declines  HCPOA: spouse  Hearing: passes whisper test at 5 feet   stress done a few years ago; does not see a cardiologist  prevnar 1/23/19  pneumovax 8/31/2020  shingrix vaccine: due- will work on at next visit with Rx  Health Maintenance Due   Topic Date Due    Hepatitis C Screening  Never done    TETANUS VACCINE  Never done    Shingles Vaccine (1 of 2) Never done    RSV Vaccine (Age 60+ and Pregnant patients) (1 - 1-dose 60+ series) Never done    COVID-19 Vaccine (7 - 2023-24 season) 09/01/2023    Mammogram  04/28/2024     Review of Systems   Musculoskeletal:  Positive for arthralgias.       Objective:     Vitals:    04/11/24 1039   BP: 136/70   BP Location: Left arm   Patient Position: Sitting   BP Method: Large (Automatic)   Pulse: 68   Resp: 16   Temp: 98 °F (36.7 °C)   TempSrc: Temporal   SpO2: 99%   Weight: 99.5 kg (219 lb 4.8 oz)   Height: 5' 6" (1.676 m)     Physical Exam  Vitals and nursing note reviewed.   Constitutional:       Appearance: Normal appearance.   HENT:      Head: Normocephalic and atraumatic.   Neurological:      Mental Status: She is alert.       Assessment/Plan:     1. Type 2 diabetes mellitus with hyperglycemia, without long-term current use of insulin  -     semaglutide (OZEMPIC) 0.25 mg or 0.5 mg (2 mg/3 mL) pen injector; Inject 0.5 mg into the skin every 7 days.  Dispense: 3 mL; Refill: 11  Patient says pharmacy is out of stock for trulicity  D/c trulicity  See if they have ozempic in stock  If they do, continue this  2. Systemic lupus erythematosus, unspecified SLE type, unspecified organ involvement status  -     Ambulatory referral/consult to Rheumatology; Future; Expected date: 04/18/2024  Unstable, increase in joint pain  Refer to rheumatology  3. Acute kidney injury  -     Basic Metabolic Panel; Future; Expected " date: 04/25/2024  Patient with slight decline in GFR with increase dose of HCTZ and now with low sodium  Reduce HCTZ dose to half  Repeat BMP in 2 weeks  4. Chronic kidney disease, stage 3a  Slight decline in GFR with increase in HCTZ dose  Reduce HCTZ to 12.5 mg po daily  Repeat BMP to monitor  5. Hypertension associated with type 2 diabetes mellitus  BP stable for now but we need to cut down HCTZ dose  Increase water intake  Repeat BMP in 2 weeks  RTC 4 weeks with me for BP check     Follow up in about 4 weeks (around 5/9/2024) for abnormal kidney function, lab due before.    This includes face to face time and non-face to face time preparing to see the patient (eg, review of tests), obtaining and/or reviewing separately obtained history, documenting clinical information in the electronic or other health record, independently interpreting results and communicating results to the patient/family/caregiver, or care coordinator.

## 2024-04-11 NOTE — TELEPHONE ENCOUNTER
Called pt  Advised she will be switching from Trulicity to Ozempic  Pt expressed understanding  Denies any questions at the moment   Advised to call with any concerns or questions   Pt expressed understanding

## 2024-04-25 LAB
BUN SERPL-MCNC: 19 MG/DL (ref 8–27)
BUN/CREAT SERPL: 19 (ref 12–28)
CALCIUM SERPL-MCNC: 9.6 MG/DL (ref 8.7–10.3)
CHLORIDE SERPL-SCNC: 104 MMOL/L (ref 96–106)
CO2 SERPL-SCNC: 27 MMOL/L (ref 20–29)
CREAT SERPL-MCNC: 1.01 MG/DL (ref 0.57–1)
EST. GFR  (NO RACE VARIABLE): 58 ML/MIN/1.73
GLUCOSE SERPL-MCNC: 104 MG/DL (ref 70–99)
POTASSIUM SERPL-SCNC: 4.5 MMOL/L (ref 3.5–5.2)
SODIUM SERPL-SCNC: 140 MMOL/L (ref 134–144)
SPECIMEN STATUS REPORT: NORMAL

## 2024-04-30 ENCOUNTER — HOSPITAL ENCOUNTER (OUTPATIENT)
Dept: RADIOLOGY | Facility: HOSPITAL | Age: 79
Discharge: HOME | End: 2024-04-30
Attending: INTERNAL MEDICINE
Payer: MEDICARE

## 2024-04-30 DIAGNOSIS — Z12.31 ENCOUNTER FOR SCREENING MAMMOGRAM FOR BREAST CANCER: ICD-10-CM

## 2024-04-30 PROCEDURE — 77063 BREAST TOMOSYNTHESIS BI: CPT | Mod: 26,,, | Performed by: RADIOLOGY

## 2024-04-30 PROCEDURE — 77067 SCR MAMMO BI INCL CAD: CPT | Mod: TC

## 2024-04-30 PROCEDURE — 77067 SCR MAMMO BI INCL CAD: CPT | Mod: 26,,, | Performed by: RADIOLOGY

## 2024-05-03 ENCOUNTER — TELEPHONE (OUTPATIENT)
Dept: FAMILY MEDICINE | Facility: CLINIC | Age: 79
End: 2024-05-03
Payer: MEDICARE

## 2024-05-03 NOTE — TELEPHONE ENCOUNTER
----- Message from Darline Rooney MD sent at 4/30/2024  7:58 AM CDT -----  Please let patient know that her kidney function has improved on this lab test.

## 2024-05-06 ENCOUNTER — TELEPHONE (OUTPATIENT)
Dept: FAMILY MEDICINE | Facility: CLINIC | Age: 79
End: 2024-05-06
Payer: MEDICARE

## 2024-05-06 NOTE — TELEPHONE ENCOUNTER
----- Message from Darline Rooney MD sent at 5/1/2024 12:46 PM CDT -----  Please let patient know their mammogram did not reveal any evidence of breast cancer. Thanks

## 2024-05-16 ENCOUNTER — OFFICE VISIT (OUTPATIENT)
Dept: FAMILY MEDICINE | Facility: CLINIC | Age: 79
End: 2024-05-16
Payer: MEDICARE

## 2024-05-16 VITALS
WEIGHT: 216.5 LBS | DIASTOLIC BLOOD PRESSURE: 73 MMHG | HEART RATE: 78 BPM | BODY MASS INDEX: 34.79 KG/M2 | OXYGEN SATURATION: 99 % | RESPIRATION RATE: 16 BRPM | HEIGHT: 66 IN | SYSTOLIC BLOOD PRESSURE: 136 MMHG | TEMPERATURE: 98 F

## 2024-05-16 DIAGNOSIS — M32.0 DRUG-INDUCED SYSTEMIC LUPUS ERYTHEMATOSUS, UNSPECIFIED ORGAN INVOLVEMENT STATUS: ICD-10-CM

## 2024-05-16 DIAGNOSIS — E66.01 SEVERE OBESITY (BMI 35.0-39.9) WITH COMORBIDITY: ICD-10-CM

## 2024-05-16 DIAGNOSIS — I15.2 HYPERTENSION ASSOCIATED WITH TYPE 2 DIABETES MELLITUS: Primary | ICD-10-CM

## 2024-05-16 DIAGNOSIS — E11.65 TYPE 2 DIABETES MELLITUS WITH HYPERGLYCEMIA, WITHOUT LONG-TERM CURRENT USE OF INSULIN: ICD-10-CM

## 2024-05-16 DIAGNOSIS — I10 PRIMARY HYPERTENSION: ICD-10-CM

## 2024-05-16 DIAGNOSIS — E11.59 HYPERTENSION ASSOCIATED WITH TYPE 2 DIABETES MELLITUS: Primary | ICD-10-CM

## 2024-05-16 PROCEDURE — 1160F RVW MEDS BY RX/DR IN RCRD: CPT | Mod: CPTII,,, | Performed by: INTERNAL MEDICINE

## 2024-05-16 PROCEDURE — 1159F MED LIST DOCD IN RCRD: CPT | Mod: CPTII,,, | Performed by: INTERNAL MEDICINE

## 2024-05-16 PROCEDURE — 3288F FALL RISK ASSESSMENT DOCD: CPT | Mod: CPTII,,, | Performed by: INTERNAL MEDICINE

## 2024-05-16 PROCEDURE — 1101F PT FALLS ASSESS-DOCD LE1/YR: CPT | Mod: CPTII,,, | Performed by: INTERNAL MEDICINE

## 2024-05-16 PROCEDURE — 3075F SYST BP GE 130 - 139MM HG: CPT | Mod: CPTII,,, | Performed by: INTERNAL MEDICINE

## 2024-05-16 PROCEDURE — 3078F DIAST BP <80 MM HG: CPT | Mod: CPTII,,, | Performed by: INTERNAL MEDICINE

## 2024-05-16 PROCEDURE — 99214 OFFICE O/P EST MOD 30 MIN: CPT | Mod: ,,, | Performed by: INTERNAL MEDICINE

## 2024-05-16 RX ORDER — PIOGLITAZONEHYDROCHLORIDE 30 MG/1
15 TABLET ORAL DAILY
Start: 2024-05-16

## 2024-05-16 RX ORDER — SEMAGLUTIDE 0.68 MG/ML
0.5 INJECTION, SOLUTION SUBCUTANEOUS
Qty: 3 ML | Refills: 11 | Status: SHIPPED | OUTPATIENT
Start: 2024-05-16

## 2024-05-16 NOTE — PROGRESS NOTES
"Subjective:      Patient ID: Edyta Rubio is a 78 y.o. female.    Chief Complaint: Diabetes    HPI  Patient is here today to review her BP  At her last OV, her GFR reduced and sodium reduced, she had a recent increase in HCTZ dosing, we reduced HCTZ from 25 mg po daily to 12.5 mg po daily, she repeat her labs and GFR improved and also sodium returned to normal. She is here today to see how her BP is doing since we made this adjustment.    At her last OV we also referred her to rheumatology and changed her trulicity to ozempic because of issues with getting Rx meds.    She is doing okay today  Compliant with med  BP is controlled  Has not heard back from rheumatology  Doing well with ozempic but wants to lose more weight, does not feel like it cuts appetite  Is constipatied       Health Maintenance Due   Topic Date Due    Hepatitis C Screening  Never done    TETANUS VACCINE  Never done    Shingles Vaccine (1 of 2) Never done    RSV Vaccine (Age 60+ and Pregnant patients) (1 - 1-dose 60+ series) Never done    COVID-19 Vaccine (7 - 2023-24 season) 09/01/2023     Review of Systems   Musculoskeletal:  Positive for arthralgias and back pain.       Objective:     Vitals:    05/16/24 0810   BP: 136/73   BP Location: Right arm   Patient Position: Sitting   BP Method: Medium (Automatic)   Pulse: 78   Resp: 16   Temp: 97.9 °F (36.6 °C)   TempSrc: Oral   SpO2: 99%   Weight: 98.2 kg (216 lb 8 oz)   Height: 5' 6" (1.676 m)     Physical Exam  Vitals and nursing note reviewed.   Constitutional:       Appearance: Normal appearance.   HENT:      Head: Normocephalic and atraumatic.   Neurological:      Mental Status: She is alert.   Psychiatric:         Behavior: Behavior normal.       Assessment/Plan:     1. Hypertension associated with type 2 diabetes mellitus  BP stable ccm  2. Type 2 diabetes mellitus with hyperglycemia, without long-term current use of insulin  -     semaglutide (OZEMPIC) 0.25 mg or 0.5 mg (2 mg/3 mL) pen " injector; Inject 0.5 mg into the skin every 7 days.  Dispense: 3 mL; Refill: 11  BG stable however patient wants to lose more weight  We will increase her ozempic to 0.5 mg subQ weekly  Notify me for any constipation, nausea, treat with OTC miralax up to 1 capful TID but if no improvement need to stop ozempic and notify me as this can cause constipation.  Reduce actos to 15 mg po daily , discussed risk of hypoglycemia with concurrent use of medication, stop Actos for any BG <80  3. Severe obesity (BMI 35.0-39.9) with comorbidity  BMI reviewed   Weight loss advised  Increase ozempic as listed above   4. Drug-induced systemic lupus erythematosus, unspecified organ involvement status  Unchanged  Has not heard from rheumatology  I did notify Reanna with our referral coordination to find out status update  5. Primary hypertension  Stable continue current Rx    Follow up in 6 weeks to see how she is doing with increased dose of ozempic  Other orders  -     pioglitazone (ACTOS) 30 MG tablet; Take 0.5 tablets (15 mg total) by mouth once daily.       Follow up in about 6 weeks (around 6/27/2024) for Virtual Visit.    This includes face to face time and non-face to face time preparing to see the patient (eg, review of tests), obtaining and/or reviewing separately obtained history, documenting clinical information in the electronic or other health record, independently interpreting results and communicating results to the patient/family/caregiver, or care coordinator.

## 2024-06-27 ENCOUNTER — OFFICE VISIT (OUTPATIENT)
Dept: FAMILY MEDICINE | Facility: CLINIC | Age: 79
End: 2024-06-27
Payer: MEDICARE

## 2024-06-27 DIAGNOSIS — I15.2 HYPERTENSION ASSOCIATED WITH TYPE 2 DIABETES MELLITUS: ICD-10-CM

## 2024-06-27 DIAGNOSIS — M32.0 DRUG-INDUCED SYSTEMIC LUPUS ERYTHEMATOSUS, UNSPECIFIED ORGAN INVOLVEMENT STATUS: ICD-10-CM

## 2024-06-27 DIAGNOSIS — E11.69 HYPERLIPIDEMIA ASSOCIATED WITH TYPE 2 DIABETES MELLITUS: ICD-10-CM

## 2024-06-27 DIAGNOSIS — E78.5 HYPERLIPIDEMIA ASSOCIATED WITH TYPE 2 DIABETES MELLITUS: ICD-10-CM

## 2024-06-27 DIAGNOSIS — K59.03 DRUG INDUCED CONSTIPATION: ICD-10-CM

## 2024-06-27 DIAGNOSIS — M32.9 SYSTEMIC LUPUS ERYTHEMATOSUS, UNSPECIFIED SLE TYPE, UNSPECIFIED ORGAN INVOLVEMENT STATUS: ICD-10-CM

## 2024-06-27 DIAGNOSIS — E66.01 SEVERE OBESITY (BMI 35.0-39.9) WITH COMORBIDITY: ICD-10-CM

## 2024-06-27 DIAGNOSIS — E11.65 TYPE 2 DIABETES MELLITUS WITH HYPERGLYCEMIA, WITHOUT LONG-TERM CURRENT USE OF INSULIN: Primary | ICD-10-CM

## 2024-06-27 DIAGNOSIS — E11.59 HYPERTENSION ASSOCIATED WITH TYPE 2 DIABETES MELLITUS: ICD-10-CM

## 2024-06-27 PROCEDURE — 1159F MED LIST DOCD IN RCRD: CPT | Mod: CPTII,95,, | Performed by: INTERNAL MEDICINE

## 2024-06-27 PROCEDURE — 1101F PT FALLS ASSESS-DOCD LE1/YR: CPT | Mod: CPTII,95,, | Performed by: INTERNAL MEDICINE

## 2024-06-27 PROCEDURE — 99214 OFFICE O/P EST MOD 30 MIN: CPT | Mod: 95,,, | Performed by: INTERNAL MEDICINE

## 2024-06-27 PROCEDURE — 1160F RVW MEDS BY RX/DR IN RCRD: CPT | Mod: CPTII,95,, | Performed by: INTERNAL MEDICINE

## 2024-06-27 PROCEDURE — 3288F FALL RISK ASSESSMENT DOCD: CPT | Mod: CPTII,95,, | Performed by: INTERNAL MEDICINE

## 2024-06-27 RX ORDER — OMEPRAZOLE 40 MG/1
40 CAPSULE, DELAYED RELEASE ORAL EVERY MORNING
Qty: 90 CAPSULE | Refills: 3 | Status: SHIPPED | OUTPATIENT
Start: 2024-06-27 | End: 2025-06-27

## 2024-06-27 NOTE — PROGRESS NOTES
TELEMEDICINE VISIT     Patient ID: Edyta Rubio is a 78 y.o. female.  MRN: [id number]  : 1945    Subjective:        TELEMEDICINE  The patient location is: home  The chief complaint leading to consultation is: Diabetes     Visit type: Virtual visit with synchronous audio and video    Total time spent with patient: 10 minutes  15 minutes of total time spent on the encounter, which includes face to face time and non-face to face time preparing to see the patient (eg, review of tests), obtaining and/or reviewing separately obtained history, documenting clinical information in the electronic or other health record, independently interpreting results (not separately reported) and communicating results to the patient/family/caregiver, or care coordination (not separately reported).    Each patient to whom he or she provides medical services by telemedicine is:  (1) informed of the relationship between the physician and patient and the respective role of any other health care provider with respect to management of the patient; and (2) notified that he or she may decline to receive medical services by telemedicine and may withdraw from such care at any time.    HPI: HPI     DM follow up  At last OV in May 2024 we did increase ozempic to 0.5 mg sub Q weekly in an effort for both glycemic control and weight loss management, advised patient of SE profile including but not limited to constipation/nausea. Advised her to also reduce her Actos to 15 mg po daily and to stop it if she had hypoglycemia BG <80.  Patient says she lost about 2 pounds since last OV  Eating/snacking the same, says her appetite is not reduced  She says she is more constipated, one BM every 3 days now  Tried ducolax did not like it , cramped too much  Passing gas, no abdominal pain, no nausea/vomiting    Referred to rheumatology  Had appt scheduled 2024 with Dr. Rodriguez   She did see MD, on plaquenil  Patient reports having some myalgias so she  decided to stop statin and pain resolved, she says she then decided to restart it and pain returned         Health maintenance reviewed with the patient.  Health maintenance completed:  Health Maintenance Topics with due status: Not Due       Topic Last Completion Date    DEXA Scan 2023    Diabetes Urine Screening 2023    Lipid Panel 2024    Hemoglobin A1c 2024    Mammogram 2024      Health maintenance due:  Health Maintenance Due   Topic Date Due    Hepatitis C Screening  Never done    TETANUS VACCINE  Never done    Shingles Vaccine (1 of 2) Never done    RSV Vaccine (Age 60+ and Pregnant patients) (1 - 1-dose 60+ series) Never done    COVID-19 Vaccine (2023-24 season) 2023    Eye Exam  2024      ROS:  Review of Systems   Gastrointestinal:  Positive for constipation.   Musculoskeletal:  Positive for arthralgias and myalgias.      History:     Past Medical History:   Diagnosis Date    Alopecia     Diabetic peripheral neuropathy     DM (diabetes mellitus)     GERD (gastroesophageal reflux disease)     HTN (hypertension)     Hypercholesterolemia     DAMIAN (iron deficiency anemia)     Memory impairment     Osteopenia     PVD (peripheral vascular disease)     Unspecified osteoarthritis, unspecified site     Vitamin B12 deficiency       Past Surgical History:   Procedure Laterality Date    EYE SURGERY      HYSTERECTOMY      MOLE REMOVAL      chest     No family history on file.   Social History     Tobacco Use    Smoking status: Former     Current packs/day: 0.00     Types: Cigarettes     Quit date:      Years since quittin.5    Smokeless tobacco: Never   Substance and Sexual Activity    Alcohol use: Not Currently    Drug use: Not Currently    Sexual activity: Not Currently          Allergies:   Review of patient's allergies indicates:   Allergen Reactions    Celecoxib      Other reaction(s): GI Upset    Ibuprofen      Other reaction(s): Psychosis    Lisinopril       Other reaction(s): cough    Tramadol Nausea And Vomiting     Objective:   There were no vitals filed for this visit.      Physical Examination: Physical exam was not performed during this virtual visit.  Physical Exam    Labs:   Diagnostic Tests:  Significant Imaging: I have reviewed and interpreted all pertinent imaging results/findings.  Mammo Digital Screening Bilat w/ Maxim   - MAMMO DIGITAL SCREENING BILAT WITH MAXIM    BILATERAL DIGITAL SCREENING MAMMOGRAM 3D/2D WITH CAD: 4/30/2024    HISTORY: Routine screening. Patient has no complaints. Family history of breast cancer.      COMPARISONS: Comparison is made to exams dated:  4/28/2023 mammogram, 4/26/2022 mammogram, 3/31/2022 mammogram, 8/28/2020 mammogram, 10/17/2018 mammogram - [facility], and 2/13/2012 mammogram - [facility].      TECHNIQUE: Digital mammography views were performed with tomosynthesis. Current study was evaluated with a Computer Aided Detection (CAD) system.     BREAST COMPOSITION: There are scattered areas of fibroglandular tissue.      FINDINGS:   No significant masses, calcifications, or other findings are seen in either breast.    There has been no significant interval change.    IMPRESSION: NEGATIVE    There is no mammographic evidence of malignancy.     RECOMMENDATIONS:   A routine screening mammogram in one year in the absence of significant clinical findings in the interval is recommended (May 2025).      Imtiaz alston/anel:4/30/2024 10:13:16      letter sent: Mammography Normal    Mammogram BI-RADS: 1 Negative      Laboratory Data:  All pertinent labs have been reviewed.  I have reviewed the following records today:     Details:   [x] Labs [] Internal  [] External    [] Micro [] Internal  [] External    [] Pathology [] Internal  [] External    [x] Imaging [] Internal  [] External    [x] Cardiology Procedures [] Internal  [] External    [x] Provider Records [] Internal  []  External    [] Other [] Internal  [] External      Labs:   Lab Results   Component Value Date    WBC 5.7 03/27/2024    RBC 3.27 (L) 03/27/2024    HGB 10.2 (L) 03/27/2024    HCT 32.4 (L) 03/27/2024    MCV 99 (H) 03/27/2024    MCH 31.2 03/27/2024     03/27/2024     (H) 04/25/2024     04/25/2024    K 4.5 04/25/2024     04/25/2024    BUN 19 04/25/2024    CREATININE 1.01 (H) 04/25/2024    AST 14 03/27/2024    ALT 9 03/27/2024    BILITOT 0.3 03/27/2024    TSH 1.750 08/07/2020    HGBA1C 5.9 (H) 03/27/2024    MICROALBUR <12.0 12/13/2023      Medications:     Medication List with Changes/Refills   Current Medications    APPLE CIDER VINEGAR ORAL    Take 1 Dose by mouth every morning.    ASPIRIN 81 MG CHEW    Take 81 mg by mouth once daily.    FERROUS SULFATE (FEOSOL) 325 MG (65 MG IRON) TAB TABLET    Take 325 mg by mouth 2 (two) times daily.    HYDROCHLOROTHIAZIDE (HYDRODIURIL) 25 MG TABLET    Take 12.5 mg by mouth once daily.    HYDROXYCHLOROQUINE (PLAQUENIL) 200 MG TABLET    TAKE 1 TABLET BY MOUTH TWICE A DAY WITH FOOD OR MILK    LOSARTAN (COZAAR) 50 MG TABLET    TAKE 1 TABLET TWICE DAILY    MAGNESIUM 250 MG TAB    Take 500 mg by mouth once.    MULTIVITAMIN (THERAGRAN) PER TABLET    Take 1 tablet by mouth once daily.    PIOGLITAZONE (ACTOS) 30 MG TABLET    Take 0.5 tablets (15 mg total) by mouth once daily.    SEMAGLUTIDE (OZEMPIC) 0.25 MG OR 0.5 MG (2 MG/3 ML) PEN INJECTOR    Inject 0.5 mg into the skin every 7 days.    TIZANIDINE (ZANAFLEX) 4 MG TABLET    Take 1 tablet (4 mg total) by mouth nightly.   Changed and/or Refilled Medications    Modified Medication Previous Medication    OMEPRAZOLE (PRILOSEC) 40 MG CAPSULE omeprazole (PRILOSEC) 40 MG capsule       Take 1 capsule (40 mg total) by mouth every morning.    Take 1 capsule (40 mg total) by mouth every morning.   Discontinued Medications    ATORVASTATIN (LIPITOR) 40 MG TABLET    TAKE 1 TABLET AT BEDTIME     Assessment:     1. Type 2 diabetes  mellitus with hyperglycemia, without long-term current use of insulin    2. Severe obesity (BMI 35.0-39.9) with comorbidity    3. Drug-induced systemic lupus erythematosus, unspecified organ involvement status    4. Drug induced constipation    5. Systemic lupus erythematosus, unspecified SLE type, unspecified organ involvement status    6. Hypertension associated with type 2 diabetes mellitus    7. Hyperlipidemia associated with type 2 diabetes mellitus      Plan:   Diagnoses and all orders for this visit:    Type 2 diabetes mellitus with hyperglycemia, without long-term current use of insulin  BG controlled but now with more constipation  I will not increase ozempic  Constipation likely mulitfactorial from both iron and ozempic  Start miralax 1 capful by mouth BID to increase BM regularity  Notify me by Monday   If you are still constipated you will need in office evaluation and be advised to STOP OZEMPIC  If you have abdominal pain, nausea, vomiting or not able to pass gas before Monday you need to report to ED immediately for possible bowel obstruction  Will wait to hear from patient on Monday how she's doing with laxative  Severe obesity (BMI 35.0-39.9) with comorbidity  BMI reviewed  Needs to work on healthy dietary changes, self control, portion control, moderation  No increase in ozempic given SE reported above  Drug-induced systemic lupus erythematosus, unspecified organ involvement status  Stable now following Dr. Rodriguez with rheum  Drug induced constipation  BG controlled but now with more constipation  I will not increase ozempic  Constipation likely mulitfactorial from both iron and ozempic  Start miralax 1 capful by mouth BID to increase BM regularity  Notify me by Monday   If you are still constipated you will need in office evaluation and be advised to STOP OZEMPIC  If you have abdominal pain, nausea, vomiting or not able to pass gas before Monday you need to report to ED immediately for possible  bowel obstruction  Will wait to hear from patient on Monday how she's doing with laxative  Systemic lupus erythematosus, unspecified SLE type, unspecified organ involvement status  Stable now following Dr. Rodriguez with rheum  Hypertension associated with type 2 diabetes mellitus  Stable ccm  Hyperlipidemia associated with type 2 diabetes mellitus  Stable ccm  Other orders  The following orders have not been finalized:  -     omeprazole (PRILOSEC) 40 MG capsule  Myalgias:  Stop atorvastatin  Let me know by Monday if sx have resolved  If not, then you will need in office evalutaion      Will wait for patient to notify us on Monday about her constipation and if myalgias resolved  Otherwise if she is feeling good, will have 3 month f/u with labs due before, orders are in      Follow Up:   Follow up in about 3 months (around 9/27/2024) for Follow Up - Chronic Conditions.    I spent greater than 15 minutes today both in chart review and greater than 50% of that time in discussion with the patient regarding health maintenance, diagnoses, diagnostic tests, medications, treatments, symptom management, expected results and adverse effects. Patient verbalized understanding and all questions were answered.

## 2024-07-05 LAB
LEFT EYE DM RETINOPATHY: NEGATIVE
RIGHT EYE DM RETINOPATHY: NEGATIVE

## 2024-07-08 ENCOUNTER — DOCUMENTATION ONLY (OUTPATIENT)
Dept: FAMILY MEDICINE | Facility: CLINIC | Age: 79
End: 2024-07-08
Payer: MEDICARE

## 2024-07-18 ENCOUNTER — TELEPHONE (OUTPATIENT)
Dept: FAMILY MEDICINE | Facility: CLINIC | Age: 79
End: 2024-07-18
Payer: MEDICARE

## 2024-07-18 DIAGNOSIS — K92.1 BLACK STOOL: Primary | ICD-10-CM

## 2024-07-18 NOTE — TELEPHONE ENCOUNTER
"Spoke with pt   Advised that Dr Rooney would like for her to report to the ED for evaluation if pt is having blood in stool  Pt stated that she will have to pay out of pocket if she goes to the ED and that she has been having this for years & just thought that Dr Rooney would be able to fill out a paper(place referral) so she can have a colonoscopy done   I did re-advise for best patient care Dr Rooney would like for her to report to her nearest ED to be evaluated to make sure she does not have a bleed   Pt persistent about her having this for years and that she does not want to pay to go to ED  I once again re-advised that for best patient care Dr Rooney would like for her to report to her nearest ED to be evaluated to make sure she does not have a bleed  Pt stated "ok thank you" and hung up the phone  Thanks   "

## 2024-07-18 NOTE — TELEPHONE ENCOUNTER
Report to ED to evaluate if patient is having heme positive stool (blood in stool); she was on iron and this could cause black stools but she needs stool testing and CBC to evaluate for any worsening anemia  thanks

## 2024-07-18 NOTE — TELEPHONE ENCOUNTER
----- Message from Óscar Oliveros sent at 7/18/2024 11:19 AM CDT -----  Type:  Needs Medical Advice    Who Called: p  Symptoms (please be specific):    How long has patient had these symptoms:    Pharmacy name and phone #:    Would the patient rather a call back or a response via [facility]?  Best Call Back Number:  [phone]   Additional Information: called to re order is sent to [facility] for colonoscopy, pt stated her bowels are still black .  please follow up

## 2024-07-19 NOTE — TELEPHONE ENCOUNTER
I will place a GI referral for patient however when patient reports having black stools this needs to be evaluated in person to ensure that she is hemodynamically stable, repeat her lab work to ensure if this is an acute worsening of her anemia. If she does not want to report to ED despite my recommendation that is also her choice and I will place an outpatient GI referral.I have signed for the following orders AND/OR meds. Please notify the patient and ask the patient to schedule the testing and/or information about any medications that were sent.         Orders Placed This Encounter   Procedures    Ambulatory referral/consult to Gastroenterology     Standing Status:   Future     Standing Expiration Date:   8/19/2025     Referral Priority:   Routine     Referral Type:   Consultation     Referral Reason:   Specialty Services Required     Referred to Provider:   Odilon Garcia MD     Requested Specialty:   Gastroenterology     Number of Visits Requested:   1

## 2024-09-03 DIAGNOSIS — E11.65 TYPE 2 DIABETES MELLITUS WITH HYPERGLYCEMIA, WITHOUT LONG-TERM CURRENT USE OF INSULIN: ICD-10-CM

## 2024-09-03 DIAGNOSIS — E11.65 TYPE 2 DIABETES MELLITUS WITH HYPERGLYCEMIA, UNSPECIFIED WHETHER LONG TERM INSULIN USE: ICD-10-CM

## 2024-09-03 RX ORDER — ISOPROPYL ALCOHOL 70 ML/100ML
SWAB TOPICAL
Qty: 300 EACH | Refills: 3 | Status: SHIPPED | OUTPATIENT
Start: 2024-09-03

## 2024-09-23 LAB — CRC RECOMMENDATION EXT: NORMAL

## 2024-09-24 ENCOUNTER — DOCUMENTATION ONLY (OUTPATIENT)
Dept: FAMILY MEDICINE | Facility: CLINIC | Age: 79
End: 2024-09-24
Payer: MEDICARE

## 2024-10-01 ENCOUNTER — TELEPHONE (OUTPATIENT)
Dept: FAMILY MEDICINE | Facility: CLINIC | Age: 79
End: 2024-10-01
Payer: MEDICARE

## 2024-10-01 NOTE — TELEPHONE ENCOUNTER
----- Message from Mahsa sent at 10/1/2024 10:14 AM CDT -----  Regarding: orders  Who Called: Edyta Rubio    What order is the patient requesting: 3m f/u   When does the expect the orders to be performed? [facility]        Preferred Method of Contact: Phone Call  Patient's Preferred Phone Number on File: [phone]   Best Call Back Number, if different:  Additional Information: stated that she is needing labs sent to [facility] for upcoming visit. Please advise

## 2024-10-03 LAB
ALBUMIN SERPL-MCNC: 4.2 G/DL (ref 3.8–4.8)
ALBUMIN/CREAT UR: 14 MG/G CREAT (ref 0–29)
ALP SERPL-CCNC: 73 IU/L (ref 44–121)
ALT SERPL-CCNC: 10 IU/L (ref 0–32)
APPEARANCE UR: CLEAR
AST SERPL-CCNC: 13 IU/L (ref 0–40)
BILIRUB SERPL-MCNC: 0.2 MG/DL (ref 0–1.2)
BILIRUB UR QL STRIP: NEGATIVE
BUN SERPL-MCNC: 24 MG/DL (ref 8–27)
BUN/CREAT SERPL: 22 (ref 12–28)
CALCIUM SERPL-MCNC: 9.4 MG/DL (ref 8.7–10.3)
CHLORIDE SERPL-SCNC: 104 MMOL/L (ref 96–106)
CHOLEST SERPL-MCNC: 160 MG/DL (ref 100–199)
CO2 SERPL-SCNC: 24 MMOL/L (ref 20–29)
COLOR UR: YELLOW
CREAT SERPL-MCNC: 1.09 MG/DL (ref 0.57–1)
CREAT UR-MCNC: 107.4 MG/DL
EST. GFR  (NO RACE VARIABLE): 52 ML/MIN/1.73
GLOBULIN SER CALC-MCNC: 2.7 G/DL (ref 1.5–4.5)
GLUCOSE SERPL-MCNC: 96 MG/DL (ref 70–99)
GLUCOSE UR QL STRIP: NEGATIVE
HBA1C MFR BLD: 5.6 % (ref 4.8–5.6)
HDLC SERPL-MCNC: 67 MG/DL
HGB UR QL STRIP: NEGATIVE
KETONES UR QL STRIP: NEGATIVE
LDLC SERPL CALC-MCNC: 81 MG/DL (ref 0–99)
LEUKOCYTE ESTERASE UR QL STRIP: NEGATIVE
MICRO URNS: NORMAL
MICROALBUMIN UR-MCNC: 14.5 UG/ML
NITRITE UR QL STRIP: NEGATIVE
PH UR STRIP: 7 [PH] (ref 5–7.5)
POTASSIUM SERPL-SCNC: 4.5 MMOL/L (ref 3.5–5.2)
PROT SERPL-MCNC: 6.9 G/DL (ref 6–8.5)
PROT UR QL STRIP: NORMAL
SODIUM SERPL-SCNC: 142 MMOL/L (ref 134–144)
SP GR UR STRIP: 1.02 (ref 1–1.03)
TRIGL SERPL-MCNC: 61 MG/DL (ref 0–149)
UROBILINOGEN UR STRIP-MCNC: 1 MG/DL (ref 0.2–1)
VLDLC SERPL CALC-MCNC: 12 MG/DL (ref 5–40)

## 2024-10-08 ENCOUNTER — OFFICE VISIT (OUTPATIENT)
Dept: FAMILY MEDICINE | Facility: CLINIC | Age: 79
End: 2024-10-08
Payer: MEDICARE

## 2024-10-08 VITALS
WEIGHT: 210 LBS | BODY MASS INDEX: 33.75 KG/M2 | TEMPERATURE: 98 F | HEART RATE: 76 BPM | OXYGEN SATURATION: 99 % | HEIGHT: 66 IN | RESPIRATION RATE: 16 BRPM | SYSTOLIC BLOOD PRESSURE: 136 MMHG | DIASTOLIC BLOOD PRESSURE: 74 MMHG

## 2024-10-08 DIAGNOSIS — M76.32 ILIOTIBIAL BAND SYNDROME AFFECTING LEFT LOWER LEG: ICD-10-CM

## 2024-10-08 DIAGNOSIS — E11.65 TYPE 2 DIABETES MELLITUS WITH HYPERGLYCEMIA, WITHOUT LONG-TERM CURRENT USE OF INSULIN: Primary | ICD-10-CM

## 2024-10-08 DIAGNOSIS — M79.10 MYALGIA DUE TO STATIN: ICD-10-CM

## 2024-10-08 DIAGNOSIS — I15.2 HYPERTENSION ASSOCIATED WITH TYPE 2 DIABETES MELLITUS: ICD-10-CM

## 2024-10-08 DIAGNOSIS — N18.31 CHRONIC KIDNEY DISEASE, STAGE 3A: ICD-10-CM

## 2024-10-08 DIAGNOSIS — E11.69 HYPERLIPIDEMIA ASSOCIATED WITH TYPE 2 DIABETES MELLITUS: ICD-10-CM

## 2024-10-08 DIAGNOSIS — T46.6X5A MYALGIA DUE TO STATIN: ICD-10-CM

## 2024-10-08 DIAGNOSIS — E78.5 HYPERLIPIDEMIA ASSOCIATED WITH TYPE 2 DIABETES MELLITUS: ICD-10-CM

## 2024-10-08 DIAGNOSIS — M32.9 SYSTEMIC LUPUS ERYTHEMATOSUS, UNSPECIFIED SLE TYPE, UNSPECIFIED ORGAN INVOLVEMENT STATUS: ICD-10-CM

## 2024-10-08 DIAGNOSIS — E11.59 HYPERTENSION ASSOCIATED WITH TYPE 2 DIABETES MELLITUS: ICD-10-CM

## 2024-10-08 PROCEDURE — 2023F DILAT RTA XM W/O RTNOPTHY: CPT | Mod: CPTII,,, | Performed by: INTERNAL MEDICINE

## 2024-10-08 PROCEDURE — 3288F FALL RISK ASSESSMENT DOCD: CPT | Mod: CPTII,,, | Performed by: INTERNAL MEDICINE

## 2024-10-08 PROCEDURE — 1160F RVW MEDS BY RX/DR IN RCRD: CPT | Mod: CPTII,,, | Performed by: INTERNAL MEDICINE

## 2024-10-08 PROCEDURE — 3075F SYST BP GE 130 - 139MM HG: CPT | Mod: CPTII,,, | Performed by: INTERNAL MEDICINE

## 2024-10-08 PROCEDURE — 1159F MED LIST DOCD IN RCRD: CPT | Mod: CPTII,,, | Performed by: INTERNAL MEDICINE

## 2024-10-08 PROCEDURE — 99214 OFFICE O/P EST MOD 30 MIN: CPT | Mod: ,,, | Performed by: INTERNAL MEDICINE

## 2024-10-08 PROCEDURE — 3078F DIAST BP <80 MM HG: CPT | Mod: CPTII,,, | Performed by: INTERNAL MEDICINE

## 2024-10-08 PROCEDURE — 1101F PT FALLS ASSESS-DOCD LE1/YR: CPT | Mod: CPTII,,, | Performed by: INTERNAL MEDICINE

## 2024-10-08 RX ORDER — ATORVASTATIN CALCIUM 40 MG/1
40 TABLET, FILM COATED ORAL NIGHTLY
COMMUNITY
Start: 2024-01-18 | End: 2024-10-08

## 2024-10-08 NOTE — PROGRESS NOTES
"Subjective:      Patient ID: Edyta Rubio is a 79 y.o. female.    Chief Complaint: Diabetes    HPI  Patient is here today for a DM follow up.  Doing well overall, A1c improved down to 5.6, compliant with medication, no hypoglycemia.  She continue iron infusions with [facility] hematology, has new MD appt coming up soon this month, she is frustrated as she says her numbers are unchanged and medication costs a lot of money.  Main complaint remains her lower back pain and thigh pain, last MRI lumbar spine done to evaluate this was in 2021, she did PT says she had no relief. Not intersted in PT or seeing specialist for the lower back again at this time due to finances.  Health Maintenance Due   Topic Date Due    Hepatitis C Screening  Never done    TETANUS VACCINE  Never done    Shingles Vaccine (1 of 2) Never done    RSV Vaccine (Age 60+ and Pregnant patients) (1 - 1-dose 75+ series) Never done    Influenza Vaccine (1) 09/01/2024    COVID-19 Vaccine (7 - 2024-25 season) 09/01/2024     Review of Systems   Constitutional:  Negative for chills, fatigue and fever.   Musculoskeletal:  Positive for arthralgias and back pain.       Objective:     Vitals:    10/08/24 0930   BP: 136/74   BP Location: Left arm   Patient Position: Sitting   Pulse: 76   Resp: 16   Temp: 98.3 °F (36.8 °C)   TempSrc: Temporal   SpO2: 99%   Weight: 95.3 kg (210 lb)   Height: 5' 6" (1.676 m)     Physical Exam  Vitals and nursing note reviewed.   Constitutional:       General: She is not in acute distress.     Appearance: Normal appearance. She is not ill-appearing.   HENT:      Head: Normocephalic and atraumatic.   Neurological:      Mental Status: She is alert.   Psychiatric:         Behavior: Behavior normal.       Assessment/Plan:     1. Type 2 diabetes mellitus with hyperglycemia, without long-term current use of insulin  Stable A1c controlled ccm  2. Iliotibial band syndrome affecting left lower leg  Sx unchanged  Declines repeat MRI, declines PT " referral or pain management referral   3. Systemic lupus erythematosus, unspecified SLE type, unspecified organ involvement status  Patient stopped her plaquenil Rx  Advised her to f/u with rheumatology   4. Chronic kidney disease, stage 3a  Stable unchanged  Avoid NSAID use  5. Hypertension associated with type 2 diabetes mellitus  Stable continue current Rx  6. Hyperlipidemia associated with type 2 diabetes mellitus, myalgia due ot statin  LDL trending up  I did hold her statin due to myalgias  Patient says it did not make a difference with holding medication still has myalgias  Recommend to try zetia to get LDL <70, not intersted today  Will revisit this discussion OV      Follow up in about 6 months (around 4/8/2025) for Medicare Wellness.    I spent a total of 30 minutes on the day of the visit.This includes face to face time and non-face to face time preparing to see the patient (eg, review of tests), obtaining and/or reviewing separately obtained history, documenting clinical information in the electronic or other health record, independently interpreting results and communicating results to the patient/family/caregiver, or care coordinator.

## 2024-11-21 DIAGNOSIS — E11.65 TYPE 2 DIABETES MELLITUS WITH HYPERGLYCEMIA, WITHOUT LONG-TERM CURRENT USE OF INSULIN: ICD-10-CM

## 2024-11-22 RX ORDER — BLOOD SUGAR DIAGNOSTIC
STRIP MISCELLANEOUS 3 TIMES DAILY
Qty: 300 STRIP | Refills: 3 | Status: SHIPPED | OUTPATIENT
Start: 2024-11-22

## 2024-12-02 ENCOUNTER — TELEPHONE (OUTPATIENT)
Dept: FAMILY MEDICINE | Facility: CLINIC | Age: 79
End: 2024-12-02
Payer: MEDICARE

## 2024-12-02 NOTE — TELEPHONE ENCOUNTER
Pt requesting PA for Ozempic   PA submitted  PA stated med is available without authorization (NO PA NEEDED)  Thanks

## 2024-12-02 NOTE — TELEPHONE ENCOUNTER
----- Message from [employer] sent at 12/2/2024 11:12 AM CST -----  .Type:  Patient Returning Call    Who Called:pt  Who Left Message for Patient:pt  Does the patient know what this is regarding?:call back  Would the patient rather a call back or a response via [facility]? MARK  Best Call Back Number:[phone]  Additional Information: Please call back no additional information was given

## 2025-01-14 ENCOUNTER — TELEPHONE (OUTPATIENT)
Dept: FAMILY MEDICINE | Facility: CLINIC | Age: 80
End: 2025-01-14
Payer: MEDICARE

## 2025-01-14 NOTE — TELEPHONE ENCOUNTER
PT PRESENTED TO CLINIC WITH PAPERWORK RECEIVED IN THE MAIL FROM INSURANCE STATING THAT PT WILL NEED A PA OR TO SWITCH TO JARDIANCE DUE TO OZEMPIC NOT BEING ON PTS FORMULARY  WILL DO A PA TO SEE IF PT MEETS CRITERIA PER INSURANCE REQUIREMENT  THANKS

## 2025-02-05 ENCOUNTER — TELEPHONE (OUTPATIENT)
Dept: FAMILY MEDICINE | Facility: CLINIC | Age: 80
End: 2025-02-05
Payer: MEDICARE

## 2025-02-05 NOTE — TELEPHONE ENCOUNTER
----- Message from Elena sent at 2/5/2025 11:34 AM CST -----  Who Called: [facility]ane    Caller is requesting assistance/information from provider's office.    Symptoms (please be specific): Possible Lupus symptoms, hypotension, dizziness, back(where kidneys are) tenderness, and she spoke to cardiologist office and was informed to call PCP.    How long has patient had these symptoms:  n/a  List of preferred pharmacies on file (remove unneeded): n/a    Preferred Method of Contact: Phone Call  Patient's Preferred Phone Number on File: [phone]   Best Call Back Number, if different:[phone]  Additional Information:Dizziness x 1 week upon waking up, BP today 114/48 HR 77, plus 2 edema in bilateral lower extremities, generalized swelling of arms and face, headaches every morning and night, lethargy, shortness of breath when first waking up and intermittently after extended periods of exertion, renal lower back tenderness when being touched. Pt saw Dr. Gibson (rheumatologist)on 1/14/25 and had blood work on 1/8/25 reported that kidney function was worsening but nothing else was done. Last Retacrit injection was 1/29/25. Pt was put alendronate on for osteoporosis but stopped it due to adverse effects. Also pt is taking losartan (COZAAR) 50 MG tablet twice a day. Please advise.

## 2025-02-05 NOTE — TELEPHONE ENCOUNTER
Spoke to Ariane with [facility]  I did advise for pt to present to ED for immediate eval  Ariane expressed understanding   Stated she will advise the pt  Thanks

## 2025-02-11 ENCOUNTER — TELEPHONE (OUTPATIENT)
Dept: FAMILY MEDICINE | Facility: CLINIC | Age: 80
End: 2025-02-11
Payer: MEDICARE

## 2025-02-11 NOTE — TELEPHONE ENCOUNTER
----- Message from Tanja sent at 2/10/2025 12:10 PM CST -----  .Who Called: Edyta Rubio        Preferred Method of Contact: Phone Call  Patient's Preferred Phone Number on File: [phone]   Best Call Back Number, if different:[phone] 1913 Imtiaz  Additional Information: [employer] calling to see if pt due for A1c and having bp issues so she keeping log as well pt said her hemology told her she is leukemia   DR MARCELO PT: Per Dr MARCELO instruction attempted to contact pt to be sure she is aware that due to previous appt cx/no shows, [facility] has stated that they will not r/s if she does not come to the appt on 6/3/21. Pt did not answer and she does not have vm available to leave a message.

## 2025-02-13 NOTE — TELEPHONE ENCOUNTER
See below  TYSONI    Spoke to Imtiaz with [facility]  Imtiaz was inquiring about pts last A1C and also to let us know that pt will be keeping a log of her BP to bring to her next visit due to BP fluctuating quite a bit and also symptoms like dizziness and fatigue associated with BP  I did advise that pts last A1C was drawn in October and that we repeat it q 6 mos  Imtiaz expressed understanding   I did reach out to the pt   Pt confirms that she is having dizziness and BP has been all over the place but stated that she told the [facility] nurses not to call us & tell us  I did offer pt an appt with us pt refused due to being in hospital with her   I did advise for pt to reach out to us if she needs anything or changes her mind  Pt expressed understanding to all of the above  Thanks

## 2025-02-20 DIAGNOSIS — E11.59 HYPERTENSION ASSOCIATED WITH TYPE 2 DIABETES MELLITUS: ICD-10-CM

## 2025-02-20 DIAGNOSIS — I15.2 HYPERTENSION ASSOCIATED WITH TYPE 2 DIABETES MELLITUS: ICD-10-CM

## 2025-02-20 RX ORDER — LOSARTAN POTASSIUM 50 MG/1
50 TABLET ORAL 2 TIMES DAILY
Qty: 180 TABLET | Refills: 3 | Status: SHIPPED | OUTPATIENT
Start: 2025-02-20

## 2025-03-12 ENCOUNTER — LAB REQUISITION (OUTPATIENT)
Dept: LAB | Facility: HOSPITAL | Age: 80
End: 2025-03-12
Payer: MEDICARE

## 2025-03-12 DIAGNOSIS — E61.1 IRON DEFICIENCY: ICD-10-CM

## 2025-03-12 DIAGNOSIS — M79.7 FIBROMYALGIA: ICD-10-CM

## 2025-03-12 DIAGNOSIS — D69.6 THROMBOCYTOPENIA, UNSPECIFIED: ICD-10-CM

## 2025-03-12 DIAGNOSIS — D64.9 ANEMIA, UNSPECIFIED: ICD-10-CM

## 2025-03-12 DIAGNOSIS — I10 ESSENTIAL (PRIMARY) HYPERTENSION: ICD-10-CM

## 2025-03-12 DIAGNOSIS — E53.8 DEFICIENCY OF OTHER SPECIFIED B GROUP VITAMINS: ICD-10-CM

## 2025-03-12 DIAGNOSIS — E11.9 TYPE 2 DIABETES MELLITUS WITHOUT COMPLICATIONS: ICD-10-CM

## 2025-03-12 LAB
ALBUMIN SERPL-MCNC: 3.6 G/DL (ref 3.4–4.8)
ALBUMIN/GLOB SERPL: 1.2 RATIO (ref 1.1–2)
ALP SERPL-CCNC: 66 UNIT/L (ref 40–150)
ALT SERPL-CCNC: 11 UNIT/L (ref 0–55)
ANION GAP SERPL CALC-SCNC: 11 MEQ/L
AST SERPL-CCNC: 17 UNIT/L (ref 5–34)
BASOPHILS # BLD AUTO: 0.02 X10(3)/MCL
BASOPHILS NFR BLD AUTO: 0.4 %
BILIRUB SERPL-MCNC: 0.4 MG/DL
BUN SERPL-MCNC: 18.1 MG/DL (ref 9.8–20.1)
CALCIUM SERPL-MCNC: 8.9 MG/DL (ref 8.4–10.2)
CHLORIDE SERPL-SCNC: 107 MMOL/L (ref 98–107)
CO2 SERPL-SCNC: 26 MMOL/L (ref 23–31)
CREAT SERPL-MCNC: 0.92 MG/DL (ref 0.55–1.02)
CREAT/UREA NIT SERPL: 20
EOSINOPHIL # BLD AUTO: 0.1 X10(3)/MCL (ref 0–0.9)
EOSINOPHIL NFR BLD AUTO: 2.1 %
ERYTHROCYTE [DISTWIDTH] IN BLOOD BY AUTOMATED COUNT: 13.6 % (ref 11.5–17)
FERRITIN SERPL-MCNC: 173.21 NG/ML (ref 4.63–204)
GFR SERPLBLD CREATININE-BSD FMLA CKD-EPI: >60 ML/MIN/1.73/M2
GLOBULIN SER-MCNC: 3 GM/DL (ref 2.4–3.5)
GLUCOSE SERPL-MCNC: 99 MG/DL (ref 82–115)
HCT VFR BLD AUTO: 28.1 % (ref 37–47)
HGB BLD-MCNC: 9.2 G/DL (ref 12–16)
IMM GRANULOCYTES # BLD AUTO: 0.01 X10(3)/MCL (ref 0–0.04)
IMM GRANULOCYTES NFR BLD AUTO: 0.2 %
IRON SATN MFR SERPL: 42 % (ref 20–50)
IRON SERPL-MCNC: 99 UG/DL (ref 50–170)
LYMPHOCYTES # BLD AUTO: 1.46 X10(3)/MCL (ref 0.6–4.6)
LYMPHOCYTES NFR BLD AUTO: 30.4 %
MCH RBC QN AUTO: 32.3 PG (ref 27–31)
MCHC RBC AUTO-ENTMCNC: 32.7 G/DL (ref 33–36)
MCV RBC AUTO: 98.6 FL (ref 80–94)
MONOCYTES # BLD AUTO: 0.37 X10(3)/MCL (ref 0.1–1.3)
MONOCYTES NFR BLD AUTO: 7.7 %
NEUTROPHILS # BLD AUTO: 2.85 X10(3)/MCL (ref 2.1–9.2)
NEUTROPHILS NFR BLD AUTO: 59.2 %
NRBC BLD AUTO-RTO: 0 %
PLATELET # BLD AUTO: 141 X10(3)/MCL (ref 130–400)
PLATELETS.RETICULATED NFR BLD AUTO: 5.6 % (ref 0.9–11.2)
PMV BLD AUTO: 11.7 FL (ref 7.4–10.4)
POTASSIUM SERPL-SCNC: 4.4 MMOL/L (ref 3.5–5.1)
PROT SERPL-MCNC: 6.6 GM/DL (ref 5.8–7.6)
RBC # BLD AUTO: 2.85 X10(6)/MCL (ref 4.2–5.4)
RET# (OHS): 0.04 X10E6/UL (ref 0.02–0.08)
RETICULOCYTE COUNT AUTOMATED (OLG): 1.33 % (ref 1.1–2.1)
SODIUM SERPL-SCNC: 144 MMOL/L (ref 136–145)
TIBC SERPL-MCNC: 137 UG/DL (ref 70–310)
TIBC SERPL-MCNC: 236 UG/DL (ref 250–450)
TRANSFERRIN SERPL-MCNC: 208 MG/DL (ref 173–360)
VIT B12 SERPL-MCNC: 377 PG/ML (ref 213–816)
WBC # BLD AUTO: 4.81 X10(3)/MCL (ref 4.5–11.5)

## 2025-03-12 PROCEDURE — 80053 COMPREHEN METABOLIC PANEL: CPT | Performed by: STUDENT IN AN ORGANIZED HEALTH CARE EDUCATION/TRAINING PROGRAM

## 2025-03-12 PROCEDURE — 85045 AUTOMATED RETICULOCYTE COUNT: CPT | Performed by: STUDENT IN AN ORGANIZED HEALTH CARE EDUCATION/TRAINING PROGRAM

## 2025-03-12 PROCEDURE — 82607 VITAMIN B-12: CPT | Performed by: STUDENT IN AN ORGANIZED HEALTH CARE EDUCATION/TRAINING PROGRAM

## 2025-03-12 PROCEDURE — 85025 COMPLETE CBC W/AUTO DIFF WBC: CPT | Performed by: STUDENT IN AN ORGANIZED HEALTH CARE EDUCATION/TRAINING PROGRAM

## 2025-03-12 PROCEDURE — 82728 ASSAY OF FERRITIN: CPT | Performed by: STUDENT IN AN ORGANIZED HEALTH CARE EDUCATION/TRAINING PROGRAM

## 2025-03-12 PROCEDURE — 83550 IRON BINDING TEST: CPT | Performed by: STUDENT IN AN ORGANIZED HEALTH CARE EDUCATION/TRAINING PROGRAM

## 2025-03-25 ENCOUNTER — TELEPHONE (OUTPATIENT)
Dept: FAMILY MEDICINE | Facility: CLINIC | Age: 80
End: 2025-03-25
Payer: MEDICARE

## 2025-03-25 DIAGNOSIS — Z11.59 ENCOUNTER FOR HEPATITIS C SCREENING TEST FOR LOW RISK PATIENT: ICD-10-CM

## 2025-03-25 DIAGNOSIS — I15.2 HYPERTENSION ASSOCIATED WITH TYPE 2 DIABETES MELLITUS: ICD-10-CM

## 2025-03-25 DIAGNOSIS — E11.69 HYPERLIPIDEMIA ASSOCIATED WITH TYPE 2 DIABETES MELLITUS: ICD-10-CM

## 2025-03-25 DIAGNOSIS — E11.59 HYPERTENSION ASSOCIATED WITH TYPE 2 DIABETES MELLITUS: ICD-10-CM

## 2025-03-25 DIAGNOSIS — M32.9 SYSTEMIC LUPUS ERYTHEMATOSUS, UNSPECIFIED SLE TYPE, UNSPECIFIED ORGAN INVOLVEMENT STATUS: ICD-10-CM

## 2025-03-25 DIAGNOSIS — N18.31 CHRONIC KIDNEY DISEASE, STAGE 3A: ICD-10-CM

## 2025-03-25 DIAGNOSIS — E78.5 HYPERLIPIDEMIA ASSOCIATED WITH TYPE 2 DIABETES MELLITUS: ICD-10-CM

## 2025-03-25 DIAGNOSIS — Z00.00 WELLNESS EXAMINATION: Primary | ICD-10-CM

## 2025-03-25 DIAGNOSIS — D64.9 ANEMIA, UNSPECIFIED TYPE: ICD-10-CM

## 2025-03-25 DIAGNOSIS — E11.65 TYPE 2 DIABETES MELLITUS WITH HYPERGLYCEMIA, WITHOUT LONG-TERM CURRENT USE OF INSULIN: ICD-10-CM

## 2025-03-25 DIAGNOSIS — N17.9 ACUTE KIDNEY INJURY: ICD-10-CM

## 2025-03-25 NOTE — TELEPHONE ENCOUNTER
----- Message from Elena sent at 3/25/2025  9:42 AM CDT -----  Who Called: [facility]-Caller is requesting assistance/information from provider's office.Symptoms (please be specific): n/a How long has patient had these symptoms:  n/aList of preferred pharmacies on file (remove unneeded): n/aPreferred Method of Contact: Phone CallPatient's Preferred Phone Number on File: [phone] Best Call Back Number, if different:[phone] Information: Requesting for blood work to be sent for them to draw labs for pt for upcoming wellness appt.Fax: [phone]

## 2025-04-02 ENCOUNTER — LAB REQUISITION (OUTPATIENT)
Dept: LAB | Facility: HOSPITAL | Age: 80
End: 2025-04-02
Payer: MEDICARE

## 2025-04-02 DIAGNOSIS — I10 ESSENTIAL (PRIMARY) HYPERTENSION: ICD-10-CM

## 2025-04-02 DIAGNOSIS — E11.65 TYPE 2 DIABETES MELLITUS WITH HYPERGLYCEMIA: ICD-10-CM

## 2025-04-02 DIAGNOSIS — M32.9 SYSTEMIC LUPUS ERYTHEMATOSUS, UNSPECIFIED: ICD-10-CM

## 2025-04-02 DIAGNOSIS — E11.69 TYPE 2 DIABETES MELLITUS WITH OTHER SPECIFIED COMPLICATION: ICD-10-CM

## 2025-04-02 DIAGNOSIS — E11.59 TYPE 2 DIABETES MELLITUS WITH OTHER CIRCULATORY COMPLICATIONS: ICD-10-CM

## 2025-04-02 DIAGNOSIS — Z11.59 ENCOUNTER FOR SCREENING FOR OTHER VIRAL DISEASES: ICD-10-CM

## 2025-04-02 DIAGNOSIS — E78.5 HYPERLIPIDEMIA, UNSPECIFIED: ICD-10-CM

## 2025-04-02 DIAGNOSIS — D64.9 ANEMIA, UNSPECIFIED: ICD-10-CM

## 2025-04-02 DIAGNOSIS — I15.2 HYPERTENSION SECONDARY TO ENDOCRINE DISORDERS: ICD-10-CM

## 2025-04-02 DIAGNOSIS — N17.9 ACUTE KIDNEY FAILURE, UNSPECIFIED: ICD-10-CM

## 2025-04-02 DIAGNOSIS — Z00.00 ENCOUNTER FOR GENERAL ADULT MEDICAL EXAMINATION WITHOUT ABNORMAL FINDINGS: ICD-10-CM

## 2025-04-02 DIAGNOSIS — E11.9 TYPE 2 DIABETES MELLITUS WITHOUT COMPLICATIONS: ICD-10-CM

## 2025-04-02 DIAGNOSIS — N18.31 CHRONIC KIDNEY DISEASE, STAGE 3A: ICD-10-CM

## 2025-04-02 LAB
ALBUMIN SERPL-MCNC: 3.7 G/DL (ref 3.4–4.8)
ALBUMIN/GLOB SERPL: 1.2 RATIO (ref 1.1–2)
ALP SERPL-CCNC: 59 UNIT/L (ref 40–150)
ALT SERPL-CCNC: 10 UNIT/L (ref 0–55)
ANION GAP SERPL CALC-SCNC: 7 MEQ/L
AST SERPL-CCNC: 17 UNIT/L (ref 11–45)
BASOPHILS # BLD AUTO: 0.02 X10(3)/MCL
BASOPHILS NFR BLD AUTO: 0.5 %
BILIRUB SERPL-MCNC: 0.4 MG/DL
BILIRUB UR QL STRIP.AUTO: NEGATIVE
BUN SERPL-MCNC: 22.2 MG/DL (ref 9.8–20.1)
CALCIUM SERPL-MCNC: 9.2 MG/DL (ref 8.4–10.2)
CHLORIDE SERPL-SCNC: 110 MMOL/L (ref 98–107)
CHOLEST SERPL-MCNC: 172 MG/DL
CHOLEST/HDLC SERPL: 3 {RATIO} (ref 0–5)
CLARITY UR: CLEAR
CO2 SERPL-SCNC: 25 MMOL/L (ref 23–31)
COLOR UR AUTO: YELLOW
CREAT SERPL-MCNC: 1.07 MG/DL (ref 0.55–1.02)
CREAT/UREA NIT SERPL: 21
EOSINOPHIL # BLD AUTO: 0.09 X10(3)/MCL (ref 0–0.9)
EOSINOPHIL NFR BLD AUTO: 2.2 %
ERYTHROCYTE [DISTWIDTH] IN BLOOD BY AUTOMATED COUNT: 13.9 % (ref 11.5–17)
EST. AVERAGE GLUCOSE BLD GHB EST-MCNC: 105.4 MG/DL
FERRITIN SERPL-MCNC: 158.79 NG/ML (ref 4.63–204)
GFR SERPLBLD CREATININE-BSD FMLA CKD-EPI: 54 ML/MIN/1.73/M2
GLOBULIN SER-MCNC: 3 GM/DL (ref 2.4–3.5)
GLUCOSE SERPL-MCNC: 101 MG/DL (ref 82–115)
GLUCOSE UR QL STRIP: NEGATIVE
HBA1C MFR BLD: 5.3 %
HCT VFR BLD AUTO: 29.8 % (ref 37–47)
HCV AB SERPL QL IA: NONREACTIVE
HDLC SERPL-MCNC: 66 MG/DL (ref 35–60)
HGB BLD-MCNC: 9.9 G/DL (ref 12–16)
HGB UR QL STRIP: NEGATIVE
IMM GRANULOCYTES # BLD AUTO: 0.01 X10(3)/MCL (ref 0–0.04)
IMM GRANULOCYTES NFR BLD AUTO: 0.2 %
IRON SATN MFR SERPL: 33 % (ref 20–50)
IRON SERPL-MCNC: 87 UG/DL (ref 50–170)
KETONES UR QL STRIP: NEGATIVE
LDLC SERPL CALC-MCNC: 97 MG/DL (ref 50–140)
LEUKOCYTE ESTERASE UR QL STRIP: NEGATIVE
LYMPHOCYTES # BLD AUTO: 1.17 X10(3)/MCL (ref 0.6–4.6)
LYMPHOCYTES NFR BLD AUTO: 28.9 %
MCH RBC QN AUTO: 32.8 PG (ref 27–31)
MCHC RBC AUTO-ENTMCNC: 33.2 G/DL (ref 33–36)
MCV RBC AUTO: 98.7 FL (ref 80–94)
MONOCYTES # BLD AUTO: 0.3 X10(3)/MCL (ref 0.1–1.3)
MONOCYTES NFR BLD AUTO: 7.4 %
NEUTROPHILS # BLD AUTO: 2.46 X10(3)/MCL (ref 2.1–9.2)
NEUTROPHILS NFR BLD AUTO: 60.8 %
NITRITE UR QL STRIP: NEGATIVE
NRBC BLD AUTO-RTO: 0 %
PH UR STRIP: 6 [PH]
PLATELET # BLD AUTO: 154 X10(3)/MCL (ref 130–400)
PMV BLD AUTO: 11.4 FL (ref 7.4–10.4)
POTASSIUM SERPL-SCNC: 4.2 MMOL/L (ref 3.5–5.1)
PROT SERPL-MCNC: 6.7 GM/DL (ref 5.8–7.6)
PROT UR QL STRIP: NEGATIVE
RBC # BLD AUTO: 3.02 X10(6)/MCL (ref 4.2–5.4)
SODIUM SERPL-SCNC: 142 MMOL/L (ref 136–145)
SP GR UR STRIP.AUTO: >=1.03 (ref 1–1.03)
TIBC SERPL-MCNC: 176 UG/DL (ref 70–310)
TIBC SERPL-MCNC: 263 UG/DL (ref 250–450)
TRANSFERRIN SERPL-MCNC: 222 MG/DL (ref 173–360)
TRIGL SERPL-MCNC: 43 MG/DL (ref 37–140)
UROBILINOGEN UR STRIP-ACNC: 0.2
VLDLC SERPL CALC-MCNC: 9 MG/DL
WBC # BLD AUTO: 4.05 X10(3)/MCL (ref 4.5–11.5)

## 2025-04-02 PROCEDURE — 83540 ASSAY OF IRON: CPT | Performed by: STUDENT IN AN ORGANIZED HEALTH CARE EDUCATION/TRAINING PROGRAM

## 2025-04-02 PROCEDURE — 85025 COMPLETE CBC W/AUTO DIFF WBC: CPT | Performed by: STUDENT IN AN ORGANIZED HEALTH CARE EDUCATION/TRAINING PROGRAM

## 2025-04-02 PROCEDURE — 80053 COMPREHEN METABOLIC PANEL: CPT | Performed by: STUDENT IN AN ORGANIZED HEALTH CARE EDUCATION/TRAINING PROGRAM

## 2025-04-02 PROCEDURE — 80061 LIPID PANEL: CPT | Performed by: STUDENT IN AN ORGANIZED HEALTH CARE EDUCATION/TRAINING PROGRAM

## 2025-04-02 PROCEDURE — 86803 HEPATITIS C AB TEST: CPT | Performed by: STUDENT IN AN ORGANIZED HEALTH CARE EDUCATION/TRAINING PROGRAM

## 2025-04-02 PROCEDURE — 82728 ASSAY OF FERRITIN: CPT | Performed by: STUDENT IN AN ORGANIZED HEALTH CARE EDUCATION/TRAINING PROGRAM

## 2025-04-02 PROCEDURE — 87086 URINE CULTURE/COLONY COUNT: CPT | Performed by: STUDENT IN AN ORGANIZED HEALTH CARE EDUCATION/TRAINING PROGRAM

## 2025-04-02 PROCEDURE — 83036 HEMOGLOBIN GLYCOSYLATED A1C: CPT | Performed by: STUDENT IN AN ORGANIZED HEALTH CARE EDUCATION/TRAINING PROGRAM

## 2025-04-02 PROCEDURE — 81003 URINALYSIS AUTO W/O SCOPE: CPT | Performed by: STUDENT IN AN ORGANIZED HEALTH CARE EDUCATION/TRAINING PROGRAM

## 2025-04-04 LAB — BACTERIA UR CULT: NO GROWTH

## 2025-04-09 ENCOUNTER — OFFICE VISIT (OUTPATIENT)
Dept: FAMILY MEDICINE | Facility: CLINIC | Age: 80
End: 2025-04-09
Payer: MEDICARE

## 2025-04-09 VITALS
SYSTOLIC BLOOD PRESSURE: 132 MMHG | OXYGEN SATURATION: 98 % | BODY MASS INDEX: 33.41 KG/M2 | HEART RATE: 74 BPM | RESPIRATION RATE: 12 BRPM | DIASTOLIC BLOOD PRESSURE: 68 MMHG | TEMPERATURE: 98 F | HEIGHT: 66 IN | WEIGHT: 207.88 LBS

## 2025-04-09 DIAGNOSIS — D84.821 DRUG-INDUCED IMMUNODEFICIENCY: ICD-10-CM

## 2025-04-09 DIAGNOSIS — M81.0 AGE-RELATED OSTEOPOROSIS WITHOUT CURRENT PATHOLOGICAL FRACTURE: ICD-10-CM

## 2025-04-09 DIAGNOSIS — R42 DIZZINESS: ICD-10-CM

## 2025-04-09 DIAGNOSIS — I15.2 HYPERTENSION ASSOCIATED WITH TYPE 2 DIABETES MELLITUS: ICD-10-CM

## 2025-04-09 DIAGNOSIS — E11.59 HYPERTENSION ASSOCIATED WITH TYPE 2 DIABETES MELLITUS: ICD-10-CM

## 2025-04-09 DIAGNOSIS — Z79.899 DRUG-INDUCED IMMUNODEFICIENCY: ICD-10-CM

## 2025-04-09 DIAGNOSIS — M32.19 SYSTEMIC LUPUS ERYTHEMATOSUS WITH OTHER ORGAN INVOLVEMENT, UNSPECIFIED SLE TYPE: ICD-10-CM

## 2025-04-09 DIAGNOSIS — E11.65 TYPE 2 DIABETES MELLITUS WITH HYPERGLYCEMIA, WITHOUT LONG-TERM CURRENT USE OF INSULIN: ICD-10-CM

## 2025-04-09 DIAGNOSIS — N18.31 CHRONIC KIDNEY DISEASE, STAGE 3A: ICD-10-CM

## 2025-04-09 DIAGNOSIS — E11.69 HYPERLIPIDEMIA ASSOCIATED WITH TYPE 2 DIABETES MELLITUS: ICD-10-CM

## 2025-04-09 DIAGNOSIS — E78.5 HYPERLIPIDEMIA ASSOCIATED WITH TYPE 2 DIABETES MELLITUS: ICD-10-CM

## 2025-04-09 DIAGNOSIS — Z00.00 WELLNESS EXAMINATION: Primary | ICD-10-CM

## 2025-04-09 DIAGNOSIS — G25.81 RLS (RESTLESS LEGS SYNDROME): ICD-10-CM

## 2025-04-09 DIAGNOSIS — Z12.31 ENCOUNTER FOR SCREENING MAMMOGRAM FOR BREAST CANCER: ICD-10-CM

## 2025-04-09 PROBLEM — I10 PRIMARY HYPERTENSION: Status: RESOLVED | Noted: 2024-01-19 | Resolved: 2025-04-09

## 2025-04-09 RX ORDER — ROPINIROLE 0.25 MG/1
0.25 TABLET, FILM COATED ORAL NIGHTLY
Qty: 7 TABLET | Refills: 0 | Status: SHIPPED | OUTPATIENT
Start: 2025-04-09 | End: 2026-04-09

## 2025-04-09 NOTE — PROGRESS NOTES
Patient ID: [id number]     Chief Complaint: Annual Exam (Medicare Wellness)      HPI:     Edyta Rubio is a 79 y.o. female here today for a Medicare Wellness.     Patient is here today for a follow up  Reports having issues not daily but with feeling waves of dizziness/lightheaded, occurring when sitting still, lasting for minutes, then going away and coming back again. No increase in sx with head movement/bending etc, also occurs when moving. No new med changes.    1. Type 2 diabetes mellitus with hyperglycemia, without long-term current use of insulin  Stable A1c controlled on Ozempic 0.5 mg subQ weekly + Actos 30 mg po daily  2. Iliotibial band syndrome affecting left lower leg  Sx unchanged  Declines repeat MRI, declines PT referral or pain management referral   3. Systemic lupus erythematosus, unspecified SLE type, unspecified organ involvement status  Patient compliant with plaquenil  Sees Dr. Rodriguez with rheumatology     4. Chronic kidney disease, stage 3a  Stable unchanged  Avoid NSAID use  5. Hypertension associated with type 2 diabetes mellitus  Stable continue current Rx  6. Hyperlipidemia associated with type 2 diabetes mellitus, myalgia due ot statin  LDL trending up  I did hold her statin due to myalgias  Patient says it did not make a difference with holding medication still has myalgias  Recommend to try zetia to get LDL <70, not intersted today     7. Iron Deficiency Anemia, Anemia of Chronic disease:  -hematology at [facility] following  -recommended retacrit injections at home every 4 weeks, continue b12, iron and folic acid and close f/u planned      Opioid Screening: Patient medication list reviewed, patient is not taking prescription opioids. Patient is not using additional opioids than prescribed. Patient is at low risk of substance abuse based on this opioid use history.       -------------------------------------    Alopecia    Diabetic peripheral neuropathy    DM (diabetes mellitus)    GERD  (gastroesophageal reflux disease)    HTN (hypertension)    Hypercholesterolemia    DAMIAN (iron deficiency anemia)    Memory impairment    Osteopenia    PVD (peripheral vascular disease)    Unspecified osteoarthritis, unspecified site    Vitamin B12 deficiency        Past Surgical History:   Procedure Laterality Date    EYE SURGERY      HYSTERECTOMY      MOLE REMOVAL      chest       Review of patient's allergies indicates:   Allergen Reactions    Celecoxib      Other reaction(s): GI Upset    Ibuprofen      Other reaction(s): Psychosis    Lisinopril      Other reaction(s): cough    Tramadol Nausea And Vomiting       Outpatient Medications Marked as Taking for the 4/9/25 encounter (Office Visit) with Darline Rooney MD   Medication Sig Dispense Refill    ACCU-CHEK GUIDE TEST STRIPS Strp TEST BLOOD SUGAR THREE TIMES DAILY 300 strip 3    alcohol swabs (DROPSAFE ALCOHOL PREP PADS) PadM USE AS DIRECTED TOPICALLY THREE TIMES DAILY 300 each 3    epoetin doris-epbx (RETACRIT) 40,000 unit/mL injection Inject 40,000 Units into the skin. Q 4wks      ferrous sulfate (FEOSOL) 325 mg (65 mg iron) Tab tablet Take 325 mg by mouth once daily.      hydroCHLOROthiazide (HYDRODIURIL) 25 MG tablet Take 12.5 mg by mouth once daily.      hydrOXYchloroQUINE (PLAQUENIL) 200 mg tablet TAKE 1 TABLET BY MOUTH TWICE A DAY WITH FOOD OR MILK 180 tablet 3    losartan (COZAAR) 50 MG tablet TAKE 1 TABLET TWICE DAILY 180 tablet 3    magnesium 250 mg Tab Take 500 mg by mouth Daily.      multivitamin (THERAGRAN) per tablet Take 1 tablet by mouth once daily.      omeprazole (PRILOSEC) 40 MG capsule Take 1 capsule (40 mg total) by mouth every morning. (Patient taking differently: Take 40 mg by mouth every morning. PRN) 90 capsule 3    semaglutide (OZEMPIC) 0.25 mg or 0.5 mg (2 mg/3 mL) pen injector Inject 0.5 mg into the skin every 7 days. 3 mL 11    [DISCONTINUED] pioglitazone (ACTOS) 30 MG tablet Take 0.5 tablets (15 mg total)  by mouth once daily.         Social History[1]     No family history on file.     Patient Care Team:  Darline Rooney MD as PCP - General (Internal Medicine)       Subjective:     Review of Systems   Constitutional:  Positive for malaise/fatigue. Negative for chills and fever.   Cardiovascular:  Positive for chest pain.   Neurological:  Positive for dizziness.         Patient Reported Health Risk Assessment  What [age] 70-79  Are you male or female?: Female  During the past four weeks, how much have you been bothered by emotional problems such as feeling anxious, depressed, irritable, sad, or downhearted and blue?: Not at all  During the past five weeks, has your physical and/or emotional health limited your social activities with family, friends, neighbors, or groups?: Not at all  During the past four weeks, how much bodily pain have you generally had?: Severe pain  During the past four weeks, was someone available to help if you needed and wanted help?: Yes, as much as I wanted  During the past four weeks, what was the hardest physical activity you could do for at least two minutes?: Very light  Can you get to places out of walking distance without help?  (For example, can you travel alone on buses or taxis, or drive your own car?): Yes  Can you go shopping for groceries or clothes without someone's help?: Yes  Can you prepare your own meals?: Yes  Can you do your own housework without help?: Yes  Because of any health problems, do you need the help of another person with your personal care needs such as eating, bathing, dressing, or getting around the house?: No  Can you handle your own money without help?: Yes  During the past four weeks, how would you rate your health in general?: Fair  How have things been going for you during the past four weeks?: Good and bad parts about equal  Are you having difficulties driving your car?: No  Do you always fasten your seat belt when you are in a car?: Yes,  "usually  How often in the past four weeks have you been bothered by falling or dizzy when standing up?: Seldom  How often in the past four weeks have you been bothered by sexual problems?: Never  How often in the past four weeks have you been bothered by trouble eating well?: Never  How often in the past four weeks have you been bothered by teeth or denture problems?: Never  How often in the past four weeks have you been bothered with problems using the telephone?: Never  How often in the past four weeks have you been bothered by tiredness or fatigue?: Always  Have you fallen two or more times in the past year?: No  Are you afraid of falling?: Yes  Are you a smoker?: No  During the past four weeks, how many drinks of wine, beer, or other alcoholic beverages did you have?: No alcohol at all  Do you exercise for about 20 minutes three or more days a week?: No, I usually do not exercise this much  Have you been given any information to help you with hazards in your house that might hurt you?: No  Have you been given any information to help you with keeping track of your medications?: No  How often do you have trouble taking medicines the way you've been told to take them?: I always take them as prescribed  How confident are you that you can control and manage most of your health problems?: Very confident  What is your race? (Check all that apply.):     Objective:     /68 (BP Location: Left arm, Patient Position: Sitting)   Pulse 74   Temp 98.3 °F (36.8 °C) (Temporal)   Resp 12   Ht 5' 6" (1.676 m)   Wt 94.3 kg (207 lb 14.4 oz)   SpO2 98%   BMI 33.56 kg/m²     Physical Exam  Vitals and nursing note reviewed.   Constitutional:       General: She is not in acute distress.     Appearance: She is well-developed. She is not diaphoretic.   HENT:      Head: Normocephalic and atraumatic.   Eyes:      General:         Right eye: No discharge.         Left eye: No discharge.      Conjunctiva/sclera: " Conjunctivae normal.      Pupils: Pupils are equal, round, and reactive to light.   Neck:      Thyroid: No thyromegaly.   Cardiovascular:      Rate and Rhythm: Normal rate and regular rhythm.      Heart sounds: Normal heart sounds. No murmur heard.  Pulmonary:      Effort: Pulmonary effort is normal. No respiratory distress.      Breath sounds: Normal breath sounds. No wheezing or rales.   Abdominal:      General: There is no distension.      Palpations: Abdomen is soft.      Tenderness: There is no abdominal tenderness.   Musculoskeletal:      Cervical back: Normal range of motion and neck supple.      Right lower leg: Edema present.      Left lower leg: Edema present.   Lymphadenopathy:      Cervical: No cervical adenopathy.   Skin:     General: Skin is warm and dry.   Neurological:      Mental Status: She is alert and oriented to person, place, and time.   Psychiatric:         Mood and Affect: Mood normal.         Behavior: Behavior normal.                No data to display                  4/9/2025     8:30 AM 10/8/2024     9:30 AM 6/27/2024     8:00 AM 5/16/2024     8:00 AM 4/11/2024    10:15 AM 1/19/2024     8:40 AM 1/5/2024    10:00 AM   Fall Risk Assessment - Outpatient   Mobility Status Ambulatory Ambulatory Ambulatory Ambulatory Ambulatory Ambulatory Ambulatory   Number of falls 0 0 0 0 0 0 0   Identified as fall risk False False False False False False False              Assessment/Plan:     1. Wellness examination  Fasting labs reviewed  2. Type 2 diabetes mellitus with hyperglycemia, without long-term current use of insulin  Stable A1c controlled  Stop actos  Continue ozempic  3. Hypertension associated with type 2 diabetes mellitus  Stable BP ccm  4. Hyperlipidemia associated with type 2 diabetes mellitus  Not at goal  Declines treatment with medication statin or zetia  Work on low carb diet       6. Encounter for screening mammogram for breast cancer  -     Mammo Digital Screening Bilat w/ [facility] ([facility]);  Future; Expected date: 04/30/2025    7. Age-related osteoporosis without current pathological fracture  Patient stopped her medication for bone density on her own decision  8. Dizziness  -     Ambulatory referral/consult to Cardiology; Future; Expected date: 04/16/2025  Unsure of cause of dizzy spells  Not daily  Concerned for arrhythmia  Refer to cardiology  9. RLS (restless legs syndrome)  Trial of requip  Hydrate with water  10. Systemic lupus erythematosus with other organ involvement, unspecified SLE type  Stable continue plaquenil  11. Drug-induced immunodeficiency  Stable continue plaquenil  12. Chronic kidney disease, stage 3a  Stable avoid NSAID use  Other orders  -     rOPINIRole (REQUIP) 0.25 MG tablet; Take 1 tablet (0.25 mg total) by mouth every evening.  Dispense: 7 tablet; Refill: 0           Medicare Annual Wellness and Personalized Prevention Plan:   Fall Risk + Home Safety + Hearing Impairment + Depression Screen + Opioid and Substance Abuse Screening + Cognitive Impairment Screen + Health Risk Assessment all reviewed.     Health Maintenance Topics with due status: Not Due       Topic Last Completion Date    DEXA Scan 05/12/2023    Diabetes Urine Screening 10/03/2024    Lipid Panel 04/02/2025    Hemoglobin A1c 04/02/2025      The patient's Health Maintenance was reviewed and the following appears to be due at this time:   Health Maintenance Due   Topic Date Due    TETANUS VACCINE  Never done    Shingles Vaccine (1 of 2) Never done    RSV Vaccine (Age 60+ and Pregnant patients) (1 - 1-dose 75+ series) Never done    COVID-19 Vaccine (7 - 2024-25 season) 09/01/2024    Mammogram  04/30/2025    Diabetic Eye Exam  07/05/2025       Advance Care Planning     Date: 04/09/2025  Patient did not wish or was not able to name a surrogate decision maker or provide an Advance Care Plan.         Follow up in about 6 months (around 10/9/2025) for Follow Up - Chronic Conditions. In addition to their scheduled  follow up, the patient has also been instructed to follow up on as needed basis.              [1]  Social History  Socioeconomic History    Marital status:    Tobacco Use    Smoking status: Former     Types: Cigarettes     Start date: 1989     Quit date: 1968     Years since quittin.7    Smokeless tobacco: Never   Substance and Sexual Activity    Alcohol use: Not Currently    Drug use: Not Currently    Sexual activity: Not Currently

## 2025-04-28 ENCOUNTER — TELEPHONE (OUTPATIENT)
Dept: FAMILY MEDICINE | Facility: CLINIC | Age: 80
End: 2025-04-28
Payer: MEDICARE

## 2025-04-28 NOTE — TELEPHONE ENCOUNTER
Copied from CRM #[id number]. Topic: General Inquiry - Patient Advice  >> Apr 25, 2025  2:09 PM Óscar wrote:  Who Called: Ladonna Amezcua    Caller is requesting assistance/information from provider's office.    Symptoms (please be specific):    How long has patient had these symptoms:    List of preferred pharmacies on file (remove unneeded): [unfilled]  If different, enter pharmacy into here including location and phone number:       Preferred Method of Contact: Phone Call  Patient's Preferred Phone Number on File: [phone]   Best Call Back Number, if different:  Additional Information: req referral to oncology , that is not located inside of a hospital. Asked nurse calls pt to follow up

## 2025-05-02 ENCOUNTER — HOSPITAL ENCOUNTER (OUTPATIENT)
Dept: RADIOLOGY | Facility: HOSPITAL | Age: 80
Discharge: HOME | End: 2025-05-02
Attending: INTERNAL MEDICINE
Payer: MEDICARE

## 2025-05-02 DIAGNOSIS — Z12.31 ENCOUNTER FOR SCREENING MAMMOGRAM FOR BREAST CANCER: ICD-10-CM

## 2025-05-02 PROCEDURE — 77067 SCR MAMMO BI INCL CAD: CPT | Mod: TC

## 2025-05-02 PROCEDURE — 77067 SCR MAMMO BI INCL CAD: CPT | Mod: 26,,, | Performed by: RADIOLOGY

## 2025-05-02 PROCEDURE — 77063 BREAST TOMOSYNTHESIS BI: CPT | Mod: 26,,, | Performed by: RADIOLOGY

## 2025-05-05 ENCOUNTER — RESULTS FOLLOW-UP (OUTPATIENT)
Dept: FAMILY MEDICINE | Facility: CLINIC | Age: 80
End: 2025-05-05

## 2025-05-23 DIAGNOSIS — E11.65 TYPE 2 DIABETES MELLITUS WITH HYPERGLYCEMIA, WITHOUT LONG-TERM CURRENT USE OF INSULIN: ICD-10-CM

## 2025-05-23 RX ORDER — SEMAGLUTIDE 0.68 MG/ML
0.5 INJECTION, SOLUTION SUBCUTANEOUS
Qty: 3 EACH | Refills: 11 | Status: SHIPPED | OUTPATIENT
Start: 2025-05-23

## 2025-06-18 ENCOUNTER — TELEPHONE (OUTPATIENT)
Dept: PHARMACY | Facility: CLINIC | Age: 80
End: 2025-06-18
Payer: MEDICARE

## 2025-06-18 NOTE — TELEPHONE ENCOUNTER
[facility]/Population Health Chart Review & Patient Outreach Details For Medication Adherence Project    Reason for Outreach Encounter: 3rd Party payor non-compliance report ([employer], [employer], [employer], etc)  2.  Patient Outreach Method: Reviewed patient chart   3.   Medication in question:  atorvastatin d/c    4.  Reviewed and or Updates Made To: Patient Chart  5. Outreach Outcomes and/or actions taken: Medication discontinued  Additional Notes:

## 2025-07-08 ENCOUNTER — DOCUMENTATION ONLY (OUTPATIENT)
Dept: FAMILY MEDICINE | Facility: CLINIC | Age: 80
End: 2025-07-08
Payer: MEDICARE

## 2025-07-08 LAB
LEFT EYE DM RETINOPATHY: NEGATIVE
RIGHT EYE DM RETINOPATHY: NEGATIVE

## 2025-07-18 ENCOUNTER — TELEPHONE (OUTPATIENT)
Dept: FAMILY MEDICINE | Facility: CLINIC | Age: 80
End: 2025-07-18
Payer: MEDICARE

## 2025-07-18 NOTE — TELEPHONE ENCOUNTER
Copied from CRM #[id number]. Topic: General Inquiry - Patient Advice  >> Jul 18, 2025 10:37 AM Óscar wrote:  Who Called: Edyta Rubio    Caller is requesting assistance/information from provider's office.    Symptoms (please be specific):    How long has patient had these symptoms:    List of preferred pharmacies on file (remove unneeded): [unfilled]  If different, enter pharmacy into here including location and phone number:       Preferred Method of Contact: Phone Call  Patient's Preferred Phone Number on File: [phone]   Best Call Back Number, if different:[phone]  Additional Information: pt called to determine if lab results were accidentally faxed to Dr Rooney from [facility] ,  if so ,asked results are re faxed to Dr Rasta Najera. Pt asked for a call back to confirm

## 2025-08-12 ENCOUNTER — TELEPHONE (OUTPATIENT)
Dept: FAMILY MEDICINE | Facility: CLINIC | Age: 80
End: 2025-08-12
Payer: MEDICARE

## 2025-08-12 DIAGNOSIS — E11.65 TYPE 2 DIABETES MELLITUS WITH HYPERGLYCEMIA, UNSPECIFIED WHETHER LONG TERM INSULIN USE: ICD-10-CM

## 2025-08-12 DIAGNOSIS — Z79.899 DRUG-INDUCED IMMUNODEFICIENCY: ICD-10-CM

## 2025-08-12 DIAGNOSIS — N18.31 CHRONIC KIDNEY DISEASE, STAGE 3A: ICD-10-CM

## 2025-08-12 DIAGNOSIS — M32.9 SYSTEMIC LUPUS ERYTHEMATOSUS, UNSPECIFIED SLE TYPE, UNSPECIFIED ORGAN INVOLVEMENT STATUS: ICD-10-CM

## 2025-08-12 DIAGNOSIS — I73.9 PAD (PERIPHERAL ARTERY DISEASE): ICD-10-CM

## 2025-08-12 DIAGNOSIS — M32.19 SYSTEMIC LUPUS ERYTHEMATOSUS WITH OTHER ORGAN INVOLVEMENT, UNSPECIFIED SLE TYPE: ICD-10-CM

## 2025-08-12 DIAGNOSIS — R42 DIZZINESS: ICD-10-CM

## 2025-08-12 DIAGNOSIS — E78.5 HYPERLIPIDEMIA ASSOCIATED WITH TYPE 2 DIABETES MELLITUS: ICD-10-CM

## 2025-08-12 DIAGNOSIS — G25.81 RLS (RESTLESS LEGS SYNDROME): ICD-10-CM

## 2025-08-12 DIAGNOSIS — I15.2 HYPERTENSION ASSOCIATED WITH TYPE 2 DIABETES MELLITUS: ICD-10-CM

## 2025-08-12 DIAGNOSIS — K92.1 BLACK STOOL: ICD-10-CM

## 2025-08-12 DIAGNOSIS — E11.69 HYPERLIPIDEMIA ASSOCIATED WITH TYPE 2 DIABETES MELLITUS: ICD-10-CM

## 2025-08-12 DIAGNOSIS — D64.9 ANEMIA, UNSPECIFIED TYPE: ICD-10-CM

## 2025-08-12 DIAGNOSIS — M32.0 DRUG-INDUCED SYSTEMIC LUPUS ERYTHEMATOSUS, UNSPECIFIED ORGAN INVOLVEMENT STATUS: ICD-10-CM

## 2025-08-12 DIAGNOSIS — M81.0 AGE-RELATED OSTEOPOROSIS WITHOUT CURRENT PATHOLOGICAL FRACTURE: ICD-10-CM

## 2025-08-12 DIAGNOSIS — E11.65 TYPE 2 DIABETES MELLITUS WITH HYPERGLYCEMIA, WITHOUT LONG-TERM CURRENT USE OF INSULIN: Primary | ICD-10-CM

## 2025-08-12 DIAGNOSIS — D84.821 DRUG-INDUCED IMMUNODEFICIENCY: ICD-10-CM

## 2025-08-12 DIAGNOSIS — E11.59 HYPERTENSION ASSOCIATED WITH TYPE 2 DIABETES MELLITUS: ICD-10-CM

## 2025-08-25 DIAGNOSIS — E11.65 TYPE 2 DIABETES MELLITUS WITH HYPERGLYCEMIA, UNSPECIFIED WHETHER LONG TERM INSULIN USE: ICD-10-CM

## 2025-08-25 DIAGNOSIS — E11.65 TYPE 2 DIABETES MELLITUS WITH HYPERGLYCEMIA, WITHOUT LONG-TERM CURRENT USE OF INSULIN: ICD-10-CM

## 2025-08-25 RX ORDER — ISOPROPYL ALCOHOL 70 ML/100ML
SWAB TOPICAL
Qty: 100 EACH | Refills: 3 | Status: SHIPPED | OUTPATIENT
Start: 2025-08-25